# Patient Record
Sex: MALE | Race: WHITE | NOT HISPANIC OR LATINO | URBAN - NONMETROPOLITAN AREA
[De-identification: names, ages, dates, MRNs, and addresses within clinical notes are randomized per-mention and may not be internally consistent; named-entity substitution may affect disease eponyms.]

---

## 2024-03-30 ENCOUNTER — APPOINTMENT (OUTPATIENT)
Dept: CT IMAGING | Facility: HOSPITAL | Age: 39
End: 2024-03-30
Payer: COMMERCIAL

## 2024-03-30 ENCOUNTER — APPOINTMENT (OUTPATIENT)
Dept: ULTRASOUND IMAGING | Facility: HOSPITAL | Age: 39
End: 2024-03-30
Payer: COMMERCIAL

## 2024-03-30 ENCOUNTER — HOSPITAL ENCOUNTER (INPATIENT)
Facility: HOSPITAL | Age: 39
LOS: 4 days | Discharge: HOME | End: 2024-04-03
Attending: STUDENT IN AN ORGANIZED HEALTH CARE EDUCATION/TRAINING PROGRAM | Admitting: FAMILY MEDICINE
Payer: COMMERCIAL

## 2024-03-30 DIAGNOSIS — D72.829 LEUKOCYTOSIS, UNSPECIFIED TYPE: ICD-10-CM

## 2024-03-30 DIAGNOSIS — K80.20 GALLSTONES: ICD-10-CM

## 2024-03-30 DIAGNOSIS — K85.90 ACUTE PANCREATITIS, UNSPECIFIED COMPLICATION STATUS, UNSPECIFIED PANCREATITIS TYPE: Primary | ICD-10-CM

## 2024-03-30 DIAGNOSIS — R52 INTRACTABLE PAIN: ICD-10-CM

## 2024-03-30 LAB
ALBUMIN SERPL-MCNC: 4.6 G/DL (ref 3.5–5.2)
ALBUMIN/GLOB SERPL: 2.7 G/DL
ALP SERPL-CCNC: 63 U/L (ref 39–117)
ALT SERPL W P-5'-P-CCNC: 5 U/L (ref 1–41)
ANION GAP SERPL CALCULATED.3IONS-SCNC: 12.2 MMOL/L (ref 5–15)
AST SERPL-CCNC: 5 U/L (ref 1–40)
BACTERIA UR QL AUTO: NORMAL /HPF
BASOPHILS # BLD AUTO: 0.07 10*3/MM3 (ref 0–0.2)
BASOPHILS NFR BLD AUTO: 0.4 % (ref 0–1.5)
BILIRUB SERPL-MCNC: 0.3 MG/DL (ref 0–1.2)
BILIRUB UR QL STRIP: NEGATIVE
BUN SERPL-MCNC: 18 MG/DL (ref 6–20)
BUN/CREAT SERPL: 14.1 (ref 7–25)
CALCIUM SPEC-SCNC: 9 MG/DL (ref 8.6–10.5)
CHLORIDE SERPL-SCNC: 96 MMOL/L (ref 98–107)
CLARITY UR: CLEAR
CO2 SERPL-SCNC: 24.8 MMOL/L (ref 22–29)
COLOR UR: YELLOW
CREAT SERPL-MCNC: 1.28 MG/DL (ref 0.76–1.27)
D-LACTATE SERPL-SCNC: 1.1 MMOL/L (ref 0.5–2)
DEPRECATED RDW RBC AUTO: 44 FL (ref 37–54)
EGFRCR SERPLBLD CKD-EPI 2021: 72.6 ML/MIN/1.73
EOSINOPHIL # BLD AUTO: 0.08 10*3/MM3 (ref 0–0.4)
EOSINOPHIL NFR BLD AUTO: 0.5 % (ref 0.3–6.2)
ERYTHROCYTE [DISTWIDTH] IN BLOOD BY AUTOMATED COUNT: 13.7 % (ref 12.3–15.4)
GLOBULIN UR ELPH-MCNC: 1.7 GM/DL
GLUCOSE SERPL-MCNC: 117 MG/DL (ref 65–99)
GLUCOSE UR STRIP-MCNC: NEGATIVE MG/DL
HCT VFR BLD AUTO: 40.4 % (ref 37.5–51)
HGB BLD-MCNC: 13.9 G/DL (ref 13–17.7)
HGB UR QL STRIP.AUTO: NEGATIVE
HOLD SPECIMEN: NORMAL
HOLD SPECIMEN: NORMAL
HYALINE CASTS UR QL AUTO: NORMAL /LPF
IMM GRANULOCYTES # BLD AUTO: 0.15 10*3/MM3 (ref 0–0.05)
IMM GRANULOCYTES NFR BLD AUTO: 0.9 % (ref 0–0.5)
KETONES UR QL STRIP: NEGATIVE
LEUKOCYTE ESTERASE UR QL STRIP.AUTO: NEGATIVE
LIPASE SERPL-CCNC: 1510 U/L (ref 13–60)
LYMPHOCYTES # BLD AUTO: 3.09 10*3/MM3 (ref 0.7–3.1)
LYMPHOCYTES NFR BLD AUTO: 18.6 % (ref 19.6–45.3)
MCH RBC QN AUTO: 30.4 PG (ref 26.6–33)
MCHC RBC AUTO-ENTMCNC: 34.4 G/DL (ref 31.5–35.7)
MCV RBC AUTO: 88.4 FL (ref 79–97)
MONOCYTES # BLD AUTO: 0.84 10*3/MM3 (ref 0.1–0.9)
MONOCYTES NFR BLD AUTO: 5.1 % (ref 5–12)
NEUTROPHILS NFR BLD AUTO: 12.36 10*3/MM3 (ref 1.7–7)
NEUTROPHILS NFR BLD AUTO: 74.5 % (ref 42.7–76)
NITRITE UR QL STRIP: NEGATIVE
NRBC BLD AUTO-RTO: 0.2 /100 WBC (ref 0–0.2)
PH UR STRIP.AUTO: 5.5 [PH] (ref 5–8)
PLATELET # BLD AUTO: 230 10*3/MM3 (ref 140–450)
PMV BLD AUTO: 9.5 FL (ref 6–12)
POTASSIUM SERPL-SCNC: 4.2 MMOL/L (ref 3.5–5.2)
PROT SERPL-MCNC: 6.3 G/DL (ref 6–8.5)
PROT UR QL STRIP: ABNORMAL
RBC # BLD AUTO: 4.57 10*6/MM3 (ref 4.14–5.8)
RBC # UR STRIP: NORMAL /HPF
REF LAB TEST METHOD: NORMAL
SODIUM SERPL-SCNC: 133 MMOL/L (ref 136–145)
SP GR UR STRIP: >=1.03 (ref 1–1.03)
SQUAMOUS #/AREA URNS HPF: NORMAL /HPF
TROPONIN T SERPL HS-MCNC: <6 NG/L
UROBILINOGEN UR QL STRIP: ABNORMAL
WBC # UR STRIP: NORMAL /HPF
WBC NRBC COR # BLD AUTO: 16.59 10*3/MM3 (ref 3.4–10.8)
WHOLE BLOOD HOLD COAG: NORMAL
WHOLE BLOOD HOLD SPECIMEN: NORMAL

## 2024-03-30 PROCEDURE — G0378 HOSPITAL OBSERVATION PER HR: HCPCS

## 2024-03-30 PROCEDURE — 25010000002 MORPHINE PER 10 MG: Performed by: STUDENT IN AN ORGANIZED HEALTH CARE EDUCATION/TRAINING PROGRAM

## 2024-03-30 PROCEDURE — 99285 EMERGENCY DEPT VISIT HI MDM: CPT

## 2024-03-30 PROCEDURE — 76705 ECHO EXAM OF ABDOMEN: CPT

## 2024-03-30 PROCEDURE — 83605 ASSAY OF LACTIC ACID: CPT

## 2024-03-30 PROCEDURE — 25510000001 IOPAMIDOL 61 % SOLUTION: Performed by: STUDENT IN AN ORGANIZED HEALTH CARE EDUCATION/TRAINING PROGRAM

## 2024-03-30 PROCEDURE — 99222 1ST HOSP IP/OBS MODERATE 55: CPT | Performed by: FAMILY MEDICINE

## 2024-03-30 PROCEDURE — 80053 COMPREHEN METABOLIC PANEL: CPT | Performed by: STUDENT IN AN ORGANIZED HEALTH CARE EDUCATION/TRAINING PROGRAM

## 2024-03-30 PROCEDURE — 25010000002 ONDANSETRON PER 1 MG

## 2024-03-30 PROCEDURE — 83690 ASSAY OF LIPASE: CPT | Performed by: STUDENT IN AN ORGANIZED HEALTH CARE EDUCATION/TRAINING PROGRAM

## 2024-03-30 PROCEDURE — 85025 COMPLETE CBC W/AUTO DIFF WBC: CPT | Performed by: STUDENT IN AN ORGANIZED HEALTH CARE EDUCATION/TRAINING PROGRAM

## 2024-03-30 PROCEDURE — 93005 ELECTROCARDIOGRAM TRACING: CPT | Performed by: STUDENT IN AN ORGANIZED HEALTH CARE EDUCATION/TRAINING PROGRAM

## 2024-03-30 PROCEDURE — 81001 URINALYSIS AUTO W/SCOPE: CPT | Performed by: STUDENT IN AN ORGANIZED HEALTH CARE EDUCATION/TRAINING PROGRAM

## 2024-03-30 PROCEDURE — 25810000003 SODIUM CHLORIDE 0.9 % SOLUTION

## 2024-03-30 PROCEDURE — 84484 ASSAY OF TROPONIN QUANT: CPT | Performed by: STUDENT IN AN ORGANIZED HEALTH CARE EDUCATION/TRAINING PROGRAM

## 2024-03-30 PROCEDURE — 74177 CT ABD & PELVIS W/CONTRAST: CPT

## 2024-03-30 PROCEDURE — 25010000002 KETOROLAC TROMETHAMINE PER 15 MG

## 2024-03-30 RX ORDER — MORPHINE SULFATE 2 MG/ML
2 INJECTION, SOLUTION INTRAMUSCULAR; INTRAVENOUS
Status: DISCONTINUED | OUTPATIENT
Start: 2024-03-30 | End: 2024-04-01

## 2024-03-30 RX ORDER — MORPHINE SULFATE 2 MG/ML
2 INJECTION, SOLUTION INTRAMUSCULAR; INTRAVENOUS ONCE
Status: COMPLETED | OUTPATIENT
Start: 2024-03-30 | End: 2024-03-30

## 2024-03-30 RX ORDER — AMOXICILLIN 250 MG
2 CAPSULE ORAL 2 TIMES DAILY PRN
Status: DISCONTINUED | OUTPATIENT
Start: 2024-03-30 | End: 2024-04-03 | Stop reason: HOSPADM

## 2024-03-30 RX ORDER — SODIUM CHLORIDE 9 MG/ML
40 INJECTION, SOLUTION INTRAVENOUS AS NEEDED
Status: DISCONTINUED | OUTPATIENT
Start: 2024-03-30 | End: 2024-04-03 | Stop reason: HOSPADM

## 2024-03-30 RX ORDER — ACETAMINOPHEN 650 MG/1
650 SUPPOSITORY RECTAL EVERY 4 HOURS PRN
Status: DISCONTINUED | OUTPATIENT
Start: 2024-03-30 | End: 2024-04-03 | Stop reason: HOSPADM

## 2024-03-30 RX ORDER — ONDANSETRON 2 MG/ML
4 INJECTION INTRAMUSCULAR; INTRAVENOUS EVERY 6 HOURS PRN
Status: DISCONTINUED | OUTPATIENT
Start: 2024-03-30 | End: 2024-04-03 | Stop reason: HOSPADM

## 2024-03-30 RX ORDER — SODIUM CHLORIDE 0.9 % (FLUSH) 0.9 %
10 SYRINGE (ML) INJECTION AS NEEDED
Status: DISCONTINUED | OUTPATIENT
Start: 2024-03-30 | End: 2024-04-03 | Stop reason: HOSPADM

## 2024-03-30 RX ORDER — POLYETHYLENE GLYCOL 3350 17 G/17G
17 POWDER, FOR SOLUTION ORAL DAILY PRN
Status: DISCONTINUED | OUTPATIENT
Start: 2024-03-30 | End: 2024-04-03 | Stop reason: HOSPADM

## 2024-03-30 RX ORDER — ONDANSETRON 2 MG/ML
4 INJECTION INTRAMUSCULAR; INTRAVENOUS ONCE
Status: COMPLETED | OUTPATIENT
Start: 2024-03-30 | End: 2024-03-30

## 2024-03-30 RX ORDER — FAMOTIDINE 10 MG/ML
20 INJECTION, SOLUTION INTRAVENOUS ONCE
Status: COMPLETED | OUTPATIENT
Start: 2024-03-30 | End: 2024-03-30

## 2024-03-30 RX ORDER — ACETAMINOPHEN 325 MG/1
650 TABLET ORAL EVERY 4 HOURS PRN
Status: DISCONTINUED | OUTPATIENT
Start: 2024-03-30 | End: 2024-04-03 | Stop reason: HOSPADM

## 2024-03-30 RX ORDER — ACETAMINOPHEN 160 MG/5ML
650 SOLUTION ORAL EVERY 4 HOURS PRN
Status: DISCONTINUED | OUTPATIENT
Start: 2024-03-30 | End: 2024-04-03 | Stop reason: HOSPADM

## 2024-03-30 RX ORDER — NALOXONE HCL 0.4 MG/ML
0.4 VIAL (ML) INJECTION
Status: DISCONTINUED | OUTPATIENT
Start: 2024-03-30 | End: 2024-04-01

## 2024-03-30 RX ORDER — BISACODYL 5 MG/1
5 TABLET, DELAYED RELEASE ORAL DAILY PRN
Status: DISCONTINUED | OUTPATIENT
Start: 2024-03-30 | End: 2024-04-03 | Stop reason: HOSPADM

## 2024-03-30 RX ORDER — SODIUM CHLORIDE 9 MG/ML
125 INJECTION, SOLUTION INTRAVENOUS CONTINUOUS
Status: DISCONTINUED | OUTPATIENT
Start: 2024-03-31 | End: 2024-03-31

## 2024-03-30 RX ORDER — BISACODYL 10 MG
10 SUPPOSITORY, RECTAL RECTAL DAILY PRN
Status: DISCONTINUED | OUTPATIENT
Start: 2024-03-30 | End: 2024-04-03 | Stop reason: HOSPADM

## 2024-03-30 RX ORDER — ENOXAPARIN SODIUM 100 MG/ML
40 INJECTION SUBCUTANEOUS NIGHTLY
Status: DISCONTINUED | OUTPATIENT
Start: 2024-03-31 | End: 2024-04-03 | Stop reason: HOSPADM

## 2024-03-30 RX ORDER — SODIUM CHLORIDE 0.9 % (FLUSH) 0.9 %
10 SYRINGE (ML) INJECTION EVERY 12 HOURS SCHEDULED
Status: DISCONTINUED | OUTPATIENT
Start: 2024-03-31 | End: 2024-04-03 | Stop reason: HOSPADM

## 2024-03-30 RX ORDER — CHOLECALCIFEROL (VITAMIN D3) 125 MCG
5 CAPSULE ORAL NIGHTLY PRN
Status: DISCONTINUED | OUTPATIENT
Start: 2024-03-30 | End: 2024-04-03 | Stop reason: HOSPADM

## 2024-03-30 RX ORDER — KETOROLAC TROMETHAMINE 30 MG/ML
15 INJECTION, SOLUTION INTRAMUSCULAR; INTRAVENOUS ONCE
Status: COMPLETED | OUTPATIENT
Start: 2024-03-30 | End: 2024-03-30

## 2024-03-30 RX ADMIN — ONDANSETRON 4 MG: 2 INJECTION INTRAMUSCULAR; INTRAVENOUS at 19:06

## 2024-03-30 RX ADMIN — MORPHINE SULFATE 4 MG: 4 INJECTION, SOLUTION INTRAMUSCULAR; INTRAVENOUS at 21:26

## 2024-03-30 RX ADMIN — FAMOTIDINE 20 MG: 10 INJECTION INTRAVENOUS at 19:07

## 2024-03-30 RX ADMIN — SODIUM CHLORIDE 1000 ML: 9 INJECTION, SOLUTION INTRAVENOUS at 19:11

## 2024-03-30 RX ADMIN — MORPHINE SULFATE 2 MG: 2 INJECTION, SOLUTION INTRAMUSCULAR; INTRAVENOUS at 22:29

## 2024-03-30 RX ADMIN — KETOROLAC TROMETHAMINE 15 MG: 30 INJECTION, SOLUTION INTRAMUSCULAR; INTRAVENOUS at 19:04

## 2024-03-30 RX ADMIN — IOPAMIDOL 100 ML: 612 INJECTION, SOLUTION INTRAVENOUS at 19:37

## 2024-03-30 NOTE — ED PROVIDER NOTES
"Subjective  History of Present Illness:    This is a 39-year-old male present emergency room today for evaluation of abdominal pain.  Reports left lower and left upper quadrant in nature.  No history of pancreatitis.  He denies sniffing and alcohol use.  No fevers.  Went to urgent care earlier had a KUB performed and they told him that he was likely constipated, he was given a GI cocktail and MiraLAX and had a bowel movement at the urgent care that was normal per him.  No hematochezia no melena.  Some nausea, no vomiting.  No dysuria hematuria urgency or frequency.  No chest pain no shortness of air.  Has a history of reflux/heartburn but reports this feels different.      Nurses Notes reviewed and agree, including vitals, allergies, social history and prior medical history.     REVIEW OF SYSTEMS: All systems reviewed and not pertinent unless noted.  Review of Systems   Constitutional:  Negative for fever.   Respiratory:  Negative for shortness of breath.    Cardiovascular:  Negative for chest pain.   Gastrointestinal:  Positive for abdominal pain and nausea. Negative for diarrhea and vomiting.   Genitourinary:  Negative for dysuria, flank pain, frequency, hematuria, testicular pain and urgency.   All other systems reviewed and are negative.      Past Medical History:   Diagnosis Date    Psoriatic arthritis        Allergies:    Patient has no known allergies.      History reviewed. No pertinent surgical history.      Social History     Socioeconomic History    Marital status:    Tobacco Use    Smoking status: Never    Smokeless tobacco: Never   Vaping Use    Vaping status: Never Used   Substance and Sexual Activity    Alcohol use: Yes    Drug use: Never    Sexual activity: Defer         History reviewed. No pertinent family history.    Objective  Physical Exam:  /85   Pulse 76   Temp 98 °F (36.7 °C)   Resp 16   Ht 175.3 cm (69\")   Wt 118 kg (260 lb)   SpO2 93%   BMI 38.40 kg/m²      Physical " Exam  Vitals and nursing note reviewed.   Constitutional:       General: He is not in acute distress.     Appearance: He is obese. He is not ill-appearing, toxic-appearing or diaphoretic.   HENT:      Head: Normocephalic and atraumatic.      Mouth/Throat:      Mouth: Mucous membranes are moist.      Pharynx: Oropharynx is clear.   Eyes:      Extraocular Movements: Extraocular movements intact.   Cardiovascular:      Rate and Rhythm: Normal rate and regular rhythm.      Heart sounds: Normal heart sounds.   Pulmonary:      Effort: Pulmonary effort is normal.      Breath sounds: Normal breath sounds.   Abdominal:      General: Abdomen is protuberant. Bowel sounds are normal.      Palpations: Abdomen is soft.      Tenderness: There is generalized abdominal tenderness and tenderness in the epigastric area and left upper quadrant. There is no guarding or rebound.      Hernia: No hernia is present.   Skin:     General: Skin is warm and dry.      Capillary Refill: Capillary refill takes less than 2 seconds.   Neurological:      General: No focal deficit present.      Mental Status: He is alert and oriented to person, place, and time.   Psychiatric:         Mood and Affect: Mood normal. Mood is not anxious.         Behavior: Behavior normal.           Procedures    ED Course:    ED Course as of 03/30/24 2218   Sat Mar 30, 2024   1955 ATTENDING ATTESTATION  HPI: 39-year-old male presents with left-sided abdominal pain for the past several days.    MDM: ED workup reviewed.  CBC shows leukocytosis.  Lipase 1500.  CMP clinically unremarkable.  CT abdomen pelvis/ultrasound gallbladder pending.     [JS]   2217 Lipase(!): 1,510 [JR]   2217 CT Abdomen Pelvis With Contrast [JR]   2217 US Gallbladder [JR]      ED Course User Index  [JR] Dwain Vizcaino PA-C  [JS] Ollie Abarca DO       Lab Results (last 24 hours)       Procedure Component Value Units Date/Time    CBC & Differential [[id number]]  (Abnormal) Collected: 03/30/24 1842     Specimen: Blood Updated: 03/30/24 1919    Narrative:      The following orders were created for panel order CBC & Differential.  Procedure                               Abnormality         Status                     ---------                               -----------         ------                     CBC Auto Differential[[id number]]        Abnormal            Final result                 Please view results for these tests on the individual orders.    Comprehensive Metabolic Panel [[id number]]  (Abnormal) Collected: 03/30/24 1842    Specimen: Blood Updated: 03/30/24 1927     Glucose 117 mg/dL      BUN 18 mg/dL      Creatinine 1.28 mg/dL      Sodium 133 mmol/L      Potassium 4.2 mmol/L      Comment: Slight hemolysis detected by analyzer. Result may be falsely elevated.        Chloride 96 mmol/L      CO2 24.8 mmol/L      Calcium 9.0 mg/dL      Total Protein 6.3 g/dL      Albumin 4.6 g/dL      ALT (SGPT) 5 U/L      AST (SGOT) 5 U/L      Comment: Slight hemolysis detected by analyzer. Result may be falsely elevated.        Alkaline Phosphatase 63 U/L      Total Bilirubin 0.3 mg/dL      Globulin 1.7 gm/dL      A/G Ratio 2.7 g/dL      BUN/Creatinine Ratio 14.1     Anion Gap 12.2 mmol/L      eGFR 72.6 mL/min/1.73     Narrative:      GFR Normal >60  Chronic Kidney Disease <60  Kidney Failure <15    Specimen extremely lipemic, manual entry    Lipase [[id number]]  (Abnormal) Collected: 03/30/24 1842    Specimen: Blood Updated: 03/30/24 1923     Lipase 1,510 U/L     Single High Sensitivity Troponin T [[id number]]  (Normal) Collected: 03/30/24 1842    Specimen: Blood Updated: 03/30/24 1916     HS Troponin T <6 ng/L      Comment: Specimen hemolyzed.  Results may be falsely decreased.       Narrative:      Specimen extremely lipemic  High Sensitive Troponin T Reference Range:  <14.0 ng/L- Negative Female for AMI  <22.0 ng/L- Negative Male for AMI  >=14 - Abnormal Female indicating possible myocardial injury.  >=22 - Abnormal  Male indicating possible myocardial injury.   Clinicians would have to utilize clinical acumen, EKG, Troponin, and serial changes to determine if it is an Acute Myocardial Infarction or myocardial injury due to an underlying chronic condition.         CBC Auto Differential [[id number]]  (Abnormal) Collected: 03/30/24 1842    Specimen: Blood Updated: 03/30/24 1919     WBC 16.59 10*3/mm3      RBC 4.57 10*6/mm3      Hemoglobin 13.9 g/dL      Hematocrit 40.4 %      MCV 88.4 fL      MCH 30.4 pg      MCHC 34.4 g/dL      RDW 13.7 %      RDW-SD 44.0 fl      MPV 9.5 fL      Platelets 230 10*3/mm3      Neutrophil % 74.5 %      Lymphocyte % 18.6 %      Monocyte % 5.1 %      Eosinophil % 0.5 %      Basophil % 0.4 %      Immature Grans % 0.9 %      Neutrophils, Absolute 12.36 10*3/mm3      Lymphocytes, Absolute 3.09 10*3/mm3      Monocytes, Absolute 0.84 10*3/mm3      Eosinophils, Absolute 0.08 10*3/mm3      Basophils, Absolute 0.07 10*3/mm3      Immature Grans, Absolute 0.15 10*3/mm3      nRBC 0.2 /100 WBC     Lactic Acid, Plasma [[id number]]  (Normal) Collected: 03/30/24 1900    Specimen: Blood Updated: 03/30/24 1919     Lactate 1.1 mmol/L     Urinalysis With Microscopic If Indicated (No Culture) - Urine, Clean Catch [[id number]]  (Abnormal) Collected: 03/30/24 1948    Specimen: Urine, Clean Catch Updated: 03/30/24 1956     Color, UA Yellow     Appearance, UA Clear     pH, UA 5.5     Specific Gravity, UA >=1.030     Glucose, UA Negative     Ketones, UA Negative     Bilirubin, UA Negative     Blood, UA Negative     Protein, UA 30 mg/dL (1+)     Leuk Esterase, UA Negative     Nitrite, UA Negative     Urobilinogen, UA 0.2 E.U./dL    Urinalysis, Microscopic Only - Urine, Clean Catch [[id number]] Collected: 03/30/24 1948    Specimen: Urine, Clean Catch Updated: 03/30/24 2000     RBC, UA 0-2 /HPF      WBC, UA None Seen /HPF      Bacteria, UA None Seen /HPF      Squamous Epithelial Cells, UA None Seen /HPF      Hyaline Casts, UA None  Seen /LPF      Methodology Manual Light Microscopy             CT Abdomen Pelvis With Contrast    Result Date: 3/30/2024  FINAL REPORT TECHNIQUE: Axial CT images were performed from the lung bases through the symphysis pubis after the administration of intravenous contrast.  This study was performed with techniques to keep radiation doses as low as reasonably achievable (ALARA). Individualized dose reduction techniques using automated exposure control or adjustment of mA and/or kV according to the patient's size were employed. CLINICAL HISTORY: epigasttric, LUQ LLQ abdominal pain nausea rule out pancreatitis FINDINGS: LOWER CHEST: The heart is normal size.  The lung bases are clear.  ABDOMEN/PELVIS:  Liver, gallbladder and bile ducts: The liver enhances homogeneously without suspicious focal hepatic lesion.  There are gallstones without cholecystitis.  There is no definite biliary duct dilatation.  Adrenal glands: The adrenal glands are morphologically unremarkable without suspicious lesion.  Kidneys, ureter and urinary bladder: No suspicious renal lesion.  No hydronephrosis.  Urinary bladder is unremarkable.  Spleen: The spleen is normal size.  Pancreas: There is diffuse peripancreatic inflammation.  GI systems and mesentery: No evidence of bowel obstruction.  The appendix is visualized and unremarkable in appearance.  No significant mesenteric inflammation.  Lymph nodes: No definite pathologically enlarged abdominal or pelvic lymph nodes present. Vessels: The aorta and abdominal arteries are grossly patent. The IVC and portal vein are patent and grossly unremarkable. Peritoneum: No free intraperitoneal fluid or pneumoperitoneum. Pelvic viscera: No acute findings.  Body wall: No body wall contusion. No significant body wall hernias.  Bones: No acute fracture.     Impression: Acute pancreatitis. Authenticated and Electronically Signed by Hardik Canela MD on 03/30/2024 09:26:16 PM    US Gallbladder    Result Date:  3/30/2024  FINAL REPORT TECHNIQUE: sonographic images of the right upper quadrant were obtained. CLINICAL HISTORY: gallstones on ct imaging, pancreatitis, eval for bile duct obstruction or dila FINDINGS: There is a diffusely echogenic liver.  There is no visualized suspicious hepatic lesion.  There is no significant biliary ductal dilatation.  There are gallstones without cholecystitis. The gallbladder is normal.  There is an unremarkable right kidney.  The pancreas is obscured by bowel gas.     Impression: Hepatic steatosis.  Cholelithiasis without cholecystitis. Authenticated and Electronically Signed by Hardik Canela MD on 03/30/2024 09:26:15 PM    XR Abdomen Flat & Upright    Result Date: 3/30/2024  FINAL REPORT CLINICAL HISTORY: gas and abdomen distension COMPARISON: None FINDINGS: SINGLE VIEW ABDOMEN  A single view of the abdomen was obtained. There is a nonspecific bowel gas pattern.  There are probable phleboliths in the pelvis.  There is transitional anatomy at the lumbosacral junction.     Impression: Nonspecific bowel gas pattern.  Consider CT if symptoms persist. Authenticated and Electronically Signed by Tiago Reich DO on 03/30/2024 04:54:23 PM        MDM     Amount and/or Complexity of Data Reviewed  Clinical lab tests: reviewed  Tests in the radiology section of CPT®: reviewed  Tests in the medicine section of CPT®: reviewed        Initial impression of presenting illness: This is a 39-year-old male present emergency room today for evaluation of abdominal pain x 1 day.  Seen at the urgent care earlier.  KUB revealed nonspecific bowel gas pattern    DDX: includes but is not limited to: Enteritis, gastroenteritis, colitis, pancreatitis, gallbladder pathology, diverticulitis, constipation, bowel obstruction, others    Patient arrives hemodynamically stable afebrile nontachycardic nonhypoxic nontoxic-appearing with vitals interpreted by myself.     Pertinent features from physical exam: Abdomen is soft  but he does have tenderness generalized, left upper or left lower quadrant.  Additionally has some tenderness palpation epigastric region.  Cardiac auscultation regular and rhythm lungs clear.  Oropharynx clear..  No obvious incarcerated hernia to the abdomen    Initial diagnostic plan: CBC CMP lipase troponin lactic acid urinalysis CT abdomen pelvis with contrast    Results from initial plan were reviewed and interpreted by me revealing CBC reveals leukocytosis 16.59.  Creatinine 1.28, sodium of 133, glucose of 117.  Lipase is elevated at 1510, lactic is normal.  Based on his laboratory study prior to CTM suspicious for pancreatitis given his physical exam and commendation with the patient's lipase.  CT scan confirms diagnosis of acute pancreatitis with Benja pancreatic inflammation, as well as gallstones.  Right upper quadrant ultrasound does not show any common bile duct dilatation but does show gallstones.  No cholecystitis identified on CT scan or ultrasound per radiology.  EKG sinus rhythm rate of 71.  There is no obstructive pattern on CMP between his transaminases and bilirubin to suggest obstructive pattern.  Urinalysis negative for infection.  Diagnostic information from other sources: Record reviewed including KUB from urgent care visit earlier this date    Interventions / Re-evaluation: Pepcid Zofran Toradol 1 L fluids, patient received 6 of IV morphine, having continued intractable pain.    Results/clinical rationale were discussed with him at bedside, discussed likely gallstone induced pancreatitis.  He is agreeable to admission given intractable pain.    Consultations/Discussion of results with other physicians: Discussed with hospitalist for admission, dr. Thurston, [facility]    Disposition plan: Admit for intractable pain, acute pancreatitis, obs [facility] level of care.  -----    Final diagnoses:   Acute pancreatitis, unspecified complication status, unspecified  pancreatitis type   Leukocytosis, unspecified type   Gallstones   Intractable pain          Dwain Vizcaino PA-C  03/30/24 1173       Dwain Vizcaino PA-C  03/30/24 2899

## 2024-03-31 PROBLEM — K85.90 PANCREATITIS: Status: ACTIVE | Noted: 2024-03-31

## 2024-03-31 LAB
ALBUMIN SERPL-MCNC: 3.8 G/DL (ref 3.5–5.2)
ALBUMIN/GLOB SERPL: 1.1 G/DL
ALP SERPL-CCNC: 60 U/L (ref 39–117)
ALT SERPL W P-5'-P-CCNC: <5 U/L (ref 1–41)
ANION GAP SERPL CALCULATED.3IONS-SCNC: 11.2 MMOL/L (ref 5–15)
ANION GAP SERPL CALCULATED.3IONS-SCNC: 13.2 MMOL/L (ref 5–15)
ARTICHOKE IGE QN: 23 MG/DL (ref 0–100)
AST SERPL-CCNC: <5 U/L (ref 1–40)
BILIRUB SERPL-MCNC: 0.4 MG/DL (ref 0–1.2)
BUN SERPL-MCNC: 13 MG/DL (ref 6–20)
BUN SERPL-MCNC: 9 MG/DL (ref 6–20)
BUN/CREAT SERPL: 13.7 (ref 7–25)
BUN/CREAT SERPL: 9.9 (ref 7–25)
CALCIUM SPEC-SCNC: 7.5 MG/DL (ref 8.6–10.5)
CALCIUM SPEC-SCNC: 7.6 MG/DL (ref 8.6–10.5)
CHLORIDE SERPL-SCNC: 93 MMOL/L (ref 98–107)
CHLORIDE SERPL-SCNC: 97 MMOL/L (ref 98–107)
CHOLEST SERPL-MCNC: 582 MG/DL (ref 0–200)
CO2 SERPL-SCNC: 23.8 MMOL/L (ref 22–29)
CO2 SERPL-SCNC: 23.8 MMOL/L (ref 22–29)
CREAT SERPL-MCNC: 0.91 MG/DL (ref 0.76–1.27)
CREAT SERPL-MCNC: 0.95 MG/DL (ref 0.76–1.27)
DEPRECATED RDW RBC AUTO: 45.6 FL (ref 37–54)
EGFRCR SERPLBLD CKD-EPI 2021: 103.8 ML/MIN/1.73
EGFRCR SERPLBLD CKD-EPI 2021: 109.3 ML/MIN/1.73
ERYTHROCYTE [DISTWIDTH] IN BLOOD BY AUTOMATED COUNT: 14 % (ref 12.3–15.4)
GLOBULIN UR ELPH-MCNC: 3.4 GM/DL
GLUCOSE BLDC GLUCOMTR-MCNC: 102 MG/DL (ref 70–130)
GLUCOSE BLDC GLUCOMTR-MCNC: 106 MG/DL (ref 70–130)
GLUCOSE BLDC GLUCOMTR-MCNC: 108 MG/DL (ref 70–130)
GLUCOSE BLDC GLUCOMTR-MCNC: 108 MG/DL (ref 70–130)
GLUCOSE BLDC GLUCOMTR-MCNC: 110 MG/DL (ref 70–130)
GLUCOSE BLDC GLUCOMTR-MCNC: 111 MG/DL (ref 70–130)
GLUCOSE BLDC GLUCOMTR-MCNC: 117 MG/DL (ref 70–130)
GLUCOSE BLDC GLUCOMTR-MCNC: 118 MG/DL (ref 70–130)
GLUCOSE BLDC GLUCOMTR-MCNC: 118 MG/DL (ref 70–130)
GLUCOSE BLDC GLUCOMTR-MCNC: 119 MG/DL (ref 70–130)
GLUCOSE SERPL-MCNC: 108 MG/DL (ref 65–99)
GLUCOSE SERPL-MCNC: 109 MG/DL (ref 65–99)
HCT VFR BLD AUTO: 36.8 % (ref 37.5–51)
HDLC SERPL-MCNC: 15 MG/DL (ref 40–60)
HGB BLD-MCNC: 13.4 G/DL (ref 13–17.7)
LDLC SERPL CALC-MCNC: ABNORMAL MG/DL
LDLC/HDLC SERPL: ABNORMAL {RATIO}
LIPASE SERPL-CCNC: 821 U/L (ref 13–60)
MCH RBC QN AUTO: 32.8 PG (ref 26.6–33)
MCHC RBC AUTO-ENTMCNC: 36.4 G/DL (ref 31.5–35.7)
MCV RBC AUTO: 90 FL (ref 79–97)
PLATELET # BLD AUTO: 247 10*3/MM3 (ref 140–450)
PMV BLD AUTO: 9.6 FL (ref 6–12)
POTASSIUM SERPL-SCNC: 3.5 MMOL/L (ref 3.5–5.2)
POTASSIUM SERPL-SCNC: 3.9 MMOL/L (ref 3.5–5.2)
PROT SERPL-MCNC: 7.2 G/DL (ref 6–8.5)
RBC # BLD AUTO: 4.09 10*6/MM3 (ref 4.14–5.8)
SODIUM SERPL-SCNC: 130 MMOL/L (ref 136–145)
SODIUM SERPL-SCNC: 132 MMOL/L (ref 136–145)
TRIGL SERPL-MCNC: 1895 MG/DL (ref 0–150)
TRIGL SERPL-MCNC: 2933 MG/DL (ref 0–150)
VLDLC SERPL-MCNC: ABNORMAL MG/DL
WBC NRBC COR # BLD AUTO: 12.13 10*3/MM3 (ref 3.4–10.8)

## 2024-03-31 PROCEDURE — 80053 COMPREHEN METABOLIC PANEL: CPT | Performed by: FAMILY MEDICINE

## 2024-03-31 PROCEDURE — 83690 ASSAY OF LIPASE: CPT | Performed by: FAMILY MEDICINE

## 2024-03-31 PROCEDURE — 82948 REAGENT STRIP/BLOOD GLUCOSE: CPT

## 2024-03-31 PROCEDURE — 25010000002 MORPHINE PER 10 MG: Performed by: FAMILY MEDICINE

## 2024-03-31 PROCEDURE — 82948 REAGENT STRIP/BLOOD GLUCOSE: CPT | Performed by: INTERNAL MEDICINE

## 2024-03-31 PROCEDURE — 25010000002 ENOXAPARIN PER 10 MG: Performed by: FAMILY MEDICINE

## 2024-03-31 PROCEDURE — 25810000003 SODIUM CHLORIDE 0.9 % SOLUTION: Performed by: FAMILY MEDICINE

## 2024-03-31 PROCEDURE — 83721 ASSAY OF BLOOD LIPOPROTEIN: CPT | Performed by: FAMILY MEDICINE

## 2024-03-31 PROCEDURE — 99233 SBSQ HOSP IP/OBS HIGH 50: CPT | Performed by: INTERNAL MEDICINE

## 2024-03-31 PROCEDURE — 84478 ASSAY OF TRIGLYCERIDES: CPT | Performed by: INTERNAL MEDICINE

## 2024-03-31 PROCEDURE — 85027 COMPLETE CBC AUTOMATED: CPT | Performed by: FAMILY MEDICINE

## 2024-03-31 PROCEDURE — 80061 LIPID PANEL: CPT | Performed by: FAMILY MEDICINE

## 2024-03-31 RX ORDER — NICOTINE POLACRILEX 4 MG
15 LOZENGE BUCCAL
Status: DISCONTINUED | OUTPATIENT
Start: 2024-03-31 | End: 2024-04-02

## 2024-03-31 RX ORDER — DEXTROSE MONOHYDRATE 25 G/50ML
25 INJECTION, SOLUTION INTRAVENOUS
Status: DISCONTINUED | OUTPATIENT
Start: 2024-03-31 | End: 2024-04-02

## 2024-03-31 RX ORDER — DEXTROSE MONOHYDRATE 100 MG/ML
1-2 INJECTION, SOLUTION INTRAVENOUS
Status: DISCONTINUED | OUTPATIENT
Start: 2024-03-31 | End: 2024-04-02

## 2024-03-31 RX ORDER — POTASSIUM CHLORIDE 20 MEQ/1
40 TABLET, EXTENDED RELEASE ORAL EVERY 4 HOURS
Status: COMPLETED | OUTPATIENT
Start: 2024-03-31 | End: 2024-04-01

## 2024-03-31 RX ADMIN — Medication 10 ML: at 08:40

## 2024-03-31 RX ADMIN — ENOXAPARIN SODIUM 40 MG: 40 INJECTION SUBCUTANEOUS at 00:07

## 2024-03-31 RX ADMIN — DEXTROSE MONOHYDRATE 2 ML/KG/HR: 100 INJECTION, SOLUTION INTRAVENOUS at 22:00

## 2024-03-31 RX ADMIN — Medication 10 ML: at 21:12

## 2024-03-31 RX ADMIN — SODIUM CHLORIDE 125 ML/HR: 9 INJECTION, SOLUTION INTRAVENOUS at 00:07

## 2024-03-31 RX ADMIN — MORPHINE SULFATE 2 MG: 2 INJECTION, SOLUTION INTRAMUSCULAR; INTRAVENOUS at 16:45

## 2024-03-31 RX ADMIN — MORPHINE SULFATE 2 MG: 2 INJECTION, SOLUTION INTRAMUSCULAR; INTRAVENOUS at 11:06

## 2024-03-31 RX ADMIN — SODIUM CHLORIDE 125 ML/HR: 9 INJECTION, SOLUTION INTRAVENOUS at 09:09

## 2024-03-31 RX ADMIN — ENOXAPARIN SODIUM 40 MG: 40 INJECTION SUBCUTANEOUS at 21:10

## 2024-03-31 RX ADMIN — MORPHINE SULFATE 2 MG: 2 INJECTION, SOLUTION INTRAMUSCULAR; INTRAVENOUS at 05:09

## 2024-03-31 RX ADMIN — MORPHINE SULFATE 2 MG: 2 INJECTION, SOLUTION INTRAMUSCULAR; INTRAVENOUS at 21:11

## 2024-03-31 RX ADMIN — Medication 10 ML: at 00:09

## 2024-03-31 RX ADMIN — MORPHINE SULFATE 2 MG: 2 INJECTION, SOLUTION INTRAMUSCULAR; INTRAVENOUS at 18:46

## 2024-03-31 RX ADMIN — MORPHINE SULFATE 2 MG: 2 INJECTION, SOLUTION INTRAMUSCULAR; INTRAVENOUS at 23:36

## 2024-03-31 RX ADMIN — MORPHINE SULFATE 2 MG: 2 INJECTION, SOLUTION INTRAMUSCULAR; INTRAVENOUS at 00:09

## 2024-03-31 RX ADMIN — DEXTROSE MONOHYDRATE 113 ML/HR: 100 INJECTION, SOLUTION INTRAVENOUS at 11:30

## 2024-03-31 RX ADMIN — MORPHINE SULFATE 2 MG: 2 INJECTION, SOLUTION INTRAMUSCULAR; INTRAVENOUS at 14:43

## 2024-03-31 RX ADMIN — INSULIN HUMAN 0.05 UNITS/KG/HR: 1 INJECTION, SOLUTION INTRAVENOUS at 11:31

## 2024-03-31 RX ADMIN — POTASSIUM CHLORIDE 40 MEQ: 1500 TABLET, EXTENDED RELEASE ORAL at 22:31

## 2024-03-31 RX ADMIN — MORPHINE SULFATE 2 MG: 2 INJECTION, SOLUTION INTRAMUSCULAR; INTRAVENOUS at 09:09

## 2024-03-31 NOTE — PROGRESS NOTES
"Pharmacy Consult - Enoxaparin Dosing    Ciera Gibson is a 39 y.o. male who has been consulted to dose enoxaparin for VTE prophylaxis.     Allergies    Patient has no known allergies.    Relevant clinical data and objective history reviewed:     [Ht: 175.3 cm (69\"); Wt: 113 kg (249 lb 1.9 oz)]  Body mass index is 36.79 kg/m².    Estimated Creatinine Clearance: 95.1 mL/min (A) (by C-G formula based on SCr of 1.28 mg/dL (H)).    Results from last 7 days   Lab Units 03/30/24  1842   HEMOGLOBIN g/dL 13.9   HEMATOCRIT % 40.4   PLATELETS 10*3/mm3 230   CREATININE mg/dL 1.28*       Asessment/Plan    Initiate Enoxaparin 40 mg  SQ every 24 hours  Pharmacy will monitor Mr. Gibson's renal function and clinical status and adjust the enoxaparin dose and/or frequency as needed.    Thank you,  Kristine Meza RPH,PharmD  3/31/2024  00:42 EDT      "

## 2024-03-31 NOTE — H&P
[facility]      Name:  Ciera Gibson   Age:  39 y.o.  Sex:  male  :  1985  MRN:  [id number]   Visit Number:  [id number]  Admission Date:  3/30/2024  Date Of Service:  24  Primary Care Physician:  Provider, No Known    Chief Complaint:     abdominal pain     History Of Presenting Illness:      Patient is a 39 years old male with a past medical history of psoriasis/psoriatic arthritis who presented to the ER with a chief complaint of abdominal pain.  Patient reports that his pain started suddenly in the morning after he had coffee.  Pain was moderate to the left side of his abdomen then became more prominent in the epigastric area.  He reported some nausea but no vomiting.  No fever, chills or changes in bowel movements.  No urinary symptoms.  Patient went to the urgent care and had a KUB and was told that he is likely constipated, he was given GI cocktail and MiraLAX and was instructed to go to the ER if not better or worse.  Patient went home, had 2 bowel movements but his pain started back again so he came into the ER.  Patient never had similar symptoms previously.  Drinks alcohol randomly and former smoker.    On ER evaluation, his vitals were stable and was afebrile.  His labs were significant for creatinine 1.28, lipase 1510, WBC 16.5.  CT abdomen pelvis showed acute pancreatitis.  US gallbladder showed hepatic steatosis and cholelithiasis without cholecystitis. There is no significant biliary ductal dilatation.  Patient received famotidine, Toradol, morphine, Zofran and 1 L bolus of normal saline while in the ER.  Hospitalist consulted for admission for pain control.    Review Of Systems:    All systems were reviewed and negative except as mentioned in history of presenting illness, assessment and plan.    Past Medical History: Patient  has a past medical history of Psoriatic arthritis.    Past Surgical History: Patient  has no past surgical history  "on file.    Social History: Patient  reports that he has never smoked. He has never used smokeless tobacco. He reports current alcohol use. He reports that he does not use drugs.    Family History:  Patient's family history has been reviewed and found to be noncontributory.     Allergies:      Patient has no known allergies.    Home Medications:    Prior to Admission Medications       Prescriptions Last Dose Informant Patient Reported? Taking?    bisacodyl 5 MG EC tablet   No No    Take 1 tablet by mouth Daily As Needed for Constipation.    Enbrel SureClick 50 MG/ML solution auto-injector   Yes No    polyethylene glycol (MIRALAX) 17 GM/SCOOP powder   No No    17 g po daily prn constipaton          ED Medications:    Medications   sodium chloride 0.9 % flush 10 mL (has no administration in time range)   Morphine sulfate (PF) injection 2 mg (has no administration in time range)   sodium chloride 0.9 % bolus 1,000 mL (0 mL Intravenous Stopped 3/30/24 2011)   ketorolac (TORADOL) injection 15 mg (15 mg Intravenous Given 3/30/24 1904)   famotidine (PEPCID) injection 20 mg (20 mg Intravenous Given 3/30/24 1907)   ondansetron (ZOFRAN) injection 4 mg (4 mg Intravenous Given 3/30/24 1906)   iopamidol (ISOVUE-300) 61 % injection 100 mL (100 mL Intravenous Given 3/30/24 1937)   morphine injection 4 mg (4 mg Intravenous Given 3/30/24 2126)     Vital Signs:  Temp:  [98 °F (36.7 °C)-98.3 °F (36.8 °C)] 98 °F (36.7 °C)  Heart Rate:  [71-84] 71  Resp:  [16-18] 16  BP: (128-142)/(82-98) 136/85        03/30/24  1835   Weight: 118 kg (260 lb)     Body mass index is 38.4 kg/m².    Physical Exam:     Most recent vital Signs: /85   Pulse 71   Temp 98 °F (36.7 °C)   Resp 16   Ht 175.3 cm (69\")   Wt 118 kg (260 lb)   SpO2 94%   BMI 38.40 kg/m²     Physical Exam  Vitals and nursing note reviewed.   Constitutional:       General: He is not in acute distress.     Appearance: He is ill-appearing.   HENT:      Head: Normocephalic and " atraumatic.      Right Ear: External ear normal.      Left Ear: External ear normal.      Nose: Nose normal.      Mouth/Throat:      Mouth: Mucous membranes are moist.   Eyes:      Extraocular Movements: Extraocular movements intact.      Conjunctiva/sclera: Conjunctivae normal.      Pupils: Pupils are equal, round, and reactive to light.   Cardiovascular:      Rate and Rhythm: Normal rate and regular rhythm.      Pulses: Normal pulses.      Heart sounds: Normal heart sounds.   Pulmonary:      Effort: Pulmonary effort is normal.      Breath sounds: Normal breath sounds. No wheezing or rhonchi.   Abdominal:      General: Bowel sounds are normal. There is no distension.      Palpations: Abdomen is soft.      Tenderness: There is abdominal tenderness in the epigastric area. There is no guarding or rebound.   Musculoskeletal:         General: Normal range of motion.      Cervical back: Normal range of motion and neck supple.      Right lower leg: No edema.      Left lower leg: No edema.   Skin:     General: Skin is warm and dry.      Findings: Rash present.      Comments: Psoriasis patches over bilateral elbows   Neurological:      General: No focal deficit present.      Mental Status: He is alert and oriented to person, place, and time. Mental status is at baseline.      Motor: No weakness.   Psychiatric:         Mood and Affect: Mood normal.         Behavior: Behavior normal.         Laboratory data:    I have reviewed the labs done in the emergency room.    Results from last 7 days   Lab Units 03/30/24  1842   SODIUM mmol/L 133*   POTASSIUM mmol/L 4.2   CHLORIDE mmol/L 96*   CO2 mmol/L 24.8   BUN mg/dL 18   CREATININE mg/dL 1.28*   CALCIUM mg/dL 9.0   BILIRUBIN mg/dL 0.3   ALK PHOS U/L 63   ALT (SGPT) U/L 5   AST (SGOT) U/L 5   GLUCOSE mg/dL 117*     Results from last 7 days   Lab Units 03/30/24  1842   WBC 10*3/mm3 16.59*   HEMOGLOBIN g/dL 13.9   HEMATOCRIT % 40.4   PLATELETS 10*3/mm3 230         Results from last 7  days   Lab Units 03/30/24  1842   HSTROP T ng/L <6             Results from last 7 days   Lab Units 03/30/24  1842   LIPASE U/L 1,510*         Results from last 7 days   Lab Units 03/30/24  1948   COLOR UA  Yellow   GLUCOSE UA  Negative   KETONES UA  Negative   BLOOD UA  Negative   LEUKOCYTES UA  Negative   PH, URINE  5.5   BILIRUBIN UA  Negative   UROBILINOGEN UA  0.2 E.U./dL   RBC UA /HPF 0-2   WBC UA /HPF None Seen       Pain Management Panel           No data to display                EKG:      Sinus rhythm, heart rate 71, nonspecific ST/wave changes.    Radiology:    CT Abdomen Pelvis With Contrast    Result Date: 3/30/2024  FINAL REPORT TECHNIQUE: Axial CT images were performed from the lung bases through the symphysis pubis after the administration of intravenous contrast.  This study was performed with techniques to keep radiation doses as low as reasonably achievable (ALARA). Individualized dose reduction techniques using automated exposure control or adjustment of mA and/or kV according to the patient's size were employed. CLINICAL HISTORY: epigasttric, LUQ LLQ abdominal pain nausea rule out pancreatitis FINDINGS: LOWER CHEST: The heart is normal size.  The lung bases are clear.  ABDOMEN/PELVIS:  Liver, gallbladder and bile ducts: The liver enhances homogeneously without suspicious focal hepatic lesion.  There are gallstones without cholecystitis.  There is no definite biliary duct dilatation.  Adrenal glands: The adrenal glands are morphologically unremarkable without suspicious lesion.  Kidneys, ureter and urinary bladder: No suspicious renal lesion.  No hydronephrosis.  Urinary bladder is unremarkable.  Spleen: The spleen is normal size.  Pancreas: There is diffuse peripancreatic inflammation.  GI systems and mesentery: No evidence of bowel obstruction.  The appendix is visualized and unremarkable in appearance.  No significant mesenteric inflammation.  Lymph nodes: No definite pathologically enlarged  abdominal or pelvic lymph nodes present. Vessels: The aorta and abdominal arteries are grossly patent. The IVC and portal vein are patent and grossly unremarkable. Peritoneum: No free intraperitoneal fluid or pneumoperitoneum. Pelvic viscera: No acute findings.  Body wall: No body wall contusion. No significant body wall hernias.  Bones: No acute fracture.     Acute pancreatitis. Authenticated and Electronically Signed by Hardik Canela MD on 03/30/2024 09:26:16 PM    US Gallbladder    Result Date: 3/30/2024  FINAL REPORT TECHNIQUE: sonographic images of the right upper quadrant were obtained. CLINICAL HISTORY: gallstones on ct imaging, pancreatitis, eval for bile duct obstruction or dila FINDINGS: There is a diffusely echogenic liver.  There is no visualized suspicious hepatic lesion.  There is no significant biliary ductal dilatation.  There are gallstones without cholecystitis. The gallbladder is normal.  There is an unremarkable right kidney.  The pancreas is obscured by bowel gas.     Hepatic steatosis.  Cholelithiasis without cholecystitis. Authenticated and Electronically Signed by Hardik Canela MD on 03/30/2024 09:26:15 PM    XR Abdomen Flat & Upright    Result Date: 3/30/2024  FINAL REPORT CLINICAL HISTORY: gas and abdomen distension COMPARISON: None FINDINGS: SINGLE VIEW ABDOMEN  A single view of the abdomen was obtained. There is a nonspecific bowel gas pattern.  There are probable phleboliths in the pelvis.  There is transitional anatomy at the lumbosacral junction.     Nonspecific bowel gas pattern.  Consider CT if symptoms persist. Authenticated and Electronically Signed by Tiago Reich DO on 03/30/2024 04:54:23 PM     Assessment:    Acute pancreatitis, POA  Epigastric pain, POA  Psoriasis/psoriatic arthritis     Plan:    Patient is admitted for further management and treatment.    Acute pancreatitis  -IV fluids  -Pain control, antiemetics  -Full liquid diet as tolerated, advance as  tolerated  -Patient reports history of elevated triglycerides in the past  -Will check lipid panel in a.m.  -Consider GI consult in a.m.    -Continue home meds as warranted.  -Further orders as indicated per clinical course.    Risk Assessment: Moderate  DVT Prophylaxis: Lovenox prophylaxis (benefit> risk)  Code Status: Full  Diet: Liquid diet    Advance Care Planning   ACP discussion was held with the patient during this visit. Patient does not have an advance directive, information provided.       Nadeem Thurston MD  03/30/24  22:23 EDT    Dictated utilizing Dragon dictation.

## 2024-03-31 NOTE — PROGRESS NOTES
[facility] HOSPITALIST    PROGRESS NOTE    Name:  Ciera Gibson   Age:  39 y.o.  Sex:  male  :  1985  MRN:  [id number]   Visit Number:  [id number]  Admission Date:  3/30/2024  Date Of Service:  24  Primary Care Physician:  Provider, No Known     LOS: 0 days :    Chief Complaint:      Abdominal discomfort    Subjective:    3/31/2024: Persistent abdominal pain minimal oral intake as of yet.  Patient reports history of hypertriglyceridemia noncompliant with regimen to control this prior to admission.  Reports lab was unable to run his triglycerides because they were so high had to send to level pending results.    History Of Presenting Illness:       Patient is a 39 years old male with a past medical history of psoriasis/psoriatic arthritis who presented to the ER with a chief complaint of abdominal pain.  Patient reports that his pain started suddenly in the morning after he had coffee.  Pain was moderate to the left side of his abdomen then became more prominent in the epigastric area.  He reported some nausea but no vomiting.  No fever, chills or changes in bowel movements.  No urinary symptoms.  Patient went to the urgent care and had a KUB and was told that he is likely constipated, he was given GI cocktail and MiraLAX and was instructed to go to the ER if not better or worse.  Patient went home, had 2 bowel movements but his pain started back again so he came into the ER.  Patient never had similar symptoms previously.  Drinks alcohol randomly and former smoker.     On ER evaluation, his vitals were stable and was afebrile.  His labs were significant for creatinine 1.28, lipase 1510, WBC 16.5.  CT abdomen pelvis showed acute pancreatitis.  US gallbladder showed hepatic steatosis and cholelithiasis without cholecystitis. There is no significant biliary ductal dilatation.  Patient received famotidine, Toradol, morphine, Zofran and 1 L bolus of normal saline while in the ER.  Hospitalist  "consulted for admission for pain control.  Edited by: Ross Fitch DO at 3/31/2024 1143     Review of Systems:     All systems were reviewed and negative except as mentioned in subjective, assessment and plan.    Vital Signs:    Temp:  [97.7 °F (36.5 °C)-98.7 °F (37.1 °C)] 98.7 °F (37.1 °C)  Heart Rate:  [71-85] 85  Resp:  [16-20] 18  BP: (123-150)/(72-98) 124/76    Intake and output:    I/O last 3 completed shifts:  In: 821 [P.O.:180; I.V.:641]  Out: -   I/O this shift:  In: 360 [P.O.:360]  Out: -     Physical Examination:    Constitutional: No acute distress, awake, alert  HENT: NCAT, mucous membranes moist  Respiratory: Clear to auscultation bilaterally, respiratory effort normal   Cardiovascular: RRR, no murmurs, rubs, or gallops  Gastrointestinal: Positive bowel sounds, soft, moderately distended moderate to severely tender in the left abdomen with mild voluntary guarding  Musculoskeletal: No bilateral ankle edema  Psychiatric: Appropriate affect, cooperative  Neurologic: Oriented x 3, speech clear  Skin: No rashes  Edited by: Ross Fitch DO at 3/31/2024 1148     Laboratory results:    Results from last 7 days   Lab Units 03/30/24  1842   SODIUM mmol/L 133*   POTASSIUM mmol/L 4.2   CHLORIDE mmol/L 96*   CO2 mmol/L 24.8   BUN mg/dL 18   CREATININE mg/dL 1.28*   CALCIUM mg/dL 9.0   BILIRUBIN mg/dL 0.3   ALK PHOS U/L 63   ALT (SGPT) U/L 5   AST (SGOT) U/L 5   GLUCOSE mg/dL 117*     Results from last 7 days   Lab Units 03/31/24  0525 03/30/24  1842   WBC 10*3/mm3 12.13* 16.59*   HEMOGLOBIN g/dL 13.4 13.9   HEMATOCRIT % 36.8* 40.4   PLATELETS 10*3/mm3 247 230         Results from last 7 days   Lab Units 03/30/24  1842   HSTROP T ng/L <6         No results for input(s): \"PHART\", \"VQI3VFA\", \"PO2ART\", \"VRA2DXO\", \"BASEEXCESS\" in the last 8760 hours.   I have reviewed the patient's laboratory results.    Radiology results:    CT Abdomen Pelvis With Contrast    Result Date: 3/30/2024  FINAL REPORT " TECHNIQUE: Axial CT images were performed from the lung bases through the symphysis pubis after the administration of intravenous contrast.  This study was performed with techniques to keep radiation doses as low as reasonably achievable (ALARA). Individualized dose reduction techniques using automated exposure control or adjustment of mA and/or kV according to the patient's size were employed. CLINICAL HISTORY: epigasttric, LUQ LLQ abdominal pain nausea rule out pancreatitis FINDINGS: LOWER CHEST: The heart is normal size.  The lung bases are clear.  ABDOMEN/PELVIS:  Liver, gallbladder and bile ducts: The liver enhances homogeneously without suspicious focal hepatic lesion.  There are gallstones without cholecystitis.  There is no definite biliary duct dilatation.  Adrenal glands: The adrenal glands are morphologically unremarkable without suspicious lesion.  Kidneys, ureter and urinary bladder: No suspicious renal lesion.  No hydronephrosis.  Urinary bladder is unremarkable.  Spleen: The spleen is normal size.  Pancreas: There is diffuse peripancreatic inflammation.  GI systems and mesentery: No evidence of bowel obstruction.  The appendix is visualized and unremarkable in appearance.  No significant mesenteric inflammation.  Lymph nodes: No definite pathologically enlarged abdominal or pelvic lymph nodes present. Vessels: The aorta and abdominal arteries are grossly patent. The IVC and portal vein are patent and grossly unremarkable. Peritoneum: No free intraperitoneal fluid or pneumoperitoneum. Pelvic viscera: No acute findings.  Body wall: No body wall contusion. No significant body wall hernias.  Bones: No acute fracture.     Impression: Acute pancreatitis. Authenticated and Electronically Signed by Hardik Canela MD on 03/30/2024 09:26:16 PM    US Gallbladder    Result Date: 3/30/2024  FINAL REPORT TECHNIQUE: sonographic images of the right upper quadrant were obtained. CLINICAL HISTORY: gallstones on ct  imaging, pancreatitis, eval for bile duct obstruction or dila FINDINGS: There is a diffusely echogenic liver.  There is no visualized suspicious hepatic lesion.  There is no significant biliary ductal dilatation.  There are gallstones without cholecystitis. The gallbladder is normal.  There is an unremarkable right kidney.  The pancreas is obscured by bowel gas.     Impression: Hepatic steatosis.  Cholelithiasis without cholecystitis. Authenticated and Electronically Signed by Hardik Canela MD on 03/30/2024 09:26:15 PM    XR Abdomen Flat & Upright    Result Date: 3/30/2024  FINAL REPORT CLINICAL HISTORY: gas and abdomen distension COMPARISON: None FINDINGS: SINGLE VIEW ABDOMEN  A single view of the abdomen was obtained. There is a nonspecific bowel gas pattern.  There are probable phleboliths in the pelvis.  There is transitional anatomy at the lumbosacral junction.     Impression: Nonspecific bowel gas pattern.  Consider CT if symptoms persist. Authenticated and Electronically Signed by Tiago Reich DO on 03/30/2024 04:54:23 PM   I have reviewed the patient's radiology reports.    Medication Review:     I have reviewed the patient's active and prn medications.     Problem List:      Acute pancreatitis    Pancreatitis      Assessment/Plan:    Acute pancreatitis  Hypertriglyceridemia    -Transfer to ICU for Dextrose and Insulin.  Continue IV pain control and nausea control.  Correct electrolytes as needed.  Anticipate home in 1 to 2 days.  Continue Lovenox.  Edited by: Ross Fitch DO at 3/31/2024 9473     DVT Prophylaxis: Lovenox  Code Status:   Code Status and Medical Interventions:   Ordered at: 03/30/24 5261     Code Status (Patient has no pulse and is not breathing):    CPR (Attempt to Resuscitate)     Medical Interventions (Patient has pulse or is breathing):    Full Support      Diet:   Dietary Orders (From admission, onward)       Start     Ordered    03/30/24 1021  Diet: Liquid; Full Liquid; Fluid  Consistency: Thin (IDDSI 0)  Diet Effective Now        References:    Diet Order [facility]   Question Answer Comment   Diets: Liquid    Liquid Diet: Full Liquid    Fluid Consistency: Thin (IDDSI 0)        03/30/24 8624                   Discharge Plan: Anticipate home in 1 to 2 days    Ross Fitch DO  03/31/24  11:44 EDT    Dictated utilizing Dragon dictation.

## 2024-03-31 NOTE — PAYOR COMM NOTE
"TO:BC  FROM:SUAD BARROW, RN PHONE [phone]  IN CLINICALS REF# [id number]    Rafy Gibson (39 y.o. Male)       Date of Birth   1985    Social Security Number       Address   [address] [id number]    Home Phone       MRN   [id number]       Congregational   None    Marital Status                               Admission Date   3/30/24    Admission Type   Emergency    Admitting Provider   Nadeem Thurston MD    Attending Provider   Ross Fitch DO    Department, Room/Bed   [facility] TELEMETRY 4, 428/1       Discharge Date       Discharge Disposition       Discharge Destination                                 Attending Provider: Ross Fitch DO    Allergies: No Known Allergies    Isolation: None   Infection: None   Code Status: CPR    Ht: 175.3 cm (69\")   Wt: 113 kg (249 lb 1.9 oz)    Admission Cmt: None   Principal Problem: Acute pancreatitis [K85.90]                   Active Insurance as of 3/30/2024       Primary Coverage       Payor Plan Insurance Group Employer/Plan Group    Critical access hospital BLUE [id number]       Payor Plan Address Payor Plan Phone Number Payor Plan Fax Number Effective Dates    [address]   2008 - None Entered    [place] [address]         Subscriber Name Subscriber Birth Date Member ID       RAFY GIBSON 1985 [id number]                     Emergency Contacts        (Rel.) Home Phone Work Phone Mobile Phone    QUIRINO GIBSON (Spouse) [phone] -- --                 History & Physical        Nadeem Thurston MD at [address] [place] [address] HOSPITALIST   HISTORY AND PHYSICAL      Name:  Rafy Gibson   Age:  39 y.o.  Sex:  male  :  1985  MRN:  [id number]   Visit Number:  [id number]  Admission Date:  3/30/2024  Date Of Service:  24  Primary Care Physician:  Provider, No Known    Chief Complaint:     abdominal pain     History Of Presenting Illness:  "     Patient is a 39 years old male with a past medical history of psoriasis/psoriatic arthritis who presented to the ER with a chief complaint of abdominal pain.  Patient reports that his pain started suddenly in the morning after he had coffee.  Pain was moderate to the left side of his abdomen then became more prominent in the epigastric area.  He reported some nausea but no vomiting.  No fever, chills or changes in bowel movements.  No urinary symptoms.  Patient went to the urgent care and had a KUB and was told that he is likely constipated, he was given GI cocktail and MiraLAX and was instructed to go to the ER if not better or worse.  Patient went home, had 2 bowel movements but his pain started back again so he came into the ER.  Patient never had similar symptoms previously.  Drinks alcohol randomly and former smoker.    On ER evaluation, his vitals were stable and was afebrile.  His labs were significant for creatinine 1.28, lipase 1510, WBC 16.5.  CT abdomen pelvis showed acute pancreatitis.  US gallbladder showed hepatic steatosis and cholelithiasis without cholecystitis. There is no significant biliary ductal dilatation.  Patient received famotidine, Toradol, morphine, Zofran and 1 L bolus of normal saline while in the ER.  Hospitalist consulted for admission for pain control.    Review Of Systems:    All systems were reviewed and negative except as mentioned in history of presenting illness, assessment and plan.    Past Medical History: Patient  has a past medical history of Psoriatic arthritis.    Past Surgical History: Patient  has no past surgical history on file.    Social History: Patient  reports that he has never smoked. He has never used smokeless tobacco. He reports current alcohol use. He reports that he does not use drugs.    Family History:  Patient's family history has been reviewed and found to be noncontributory.     Allergies:      Patient has no known allergies.    Home  "Medications:    Prior to Admission Medications       Prescriptions Last Dose Informant Patient Reported? Taking?    bisacodyl 5 MG EC tablet   No No    Take 1 tablet by mouth Daily As Needed for Constipation.    Enbrel SureClick 50 MG/ML solution auto-injector   Yes No    polyethylene glycol (MIRALAX) 17 GM/SCOOP powder   No No    17 g po daily prn constipaton          ED Medications:    Medications   sodium chloride 0.9 % flush 10 mL (has no administration in time range)   Morphine sulfate (PF) injection 2 mg (has no administration in time range)   sodium chloride 0.9 % bolus 1,000 mL (0 mL Intravenous Stopped 3/30/24 2011)   ketorolac (TORADOL) injection 15 mg (15 mg Intravenous Given 3/30/24 1904)   famotidine (PEPCID) injection 20 mg (20 mg Intravenous Given 3/30/24 1907)   ondansetron (ZOFRAN) injection 4 mg (4 mg Intravenous Given 3/30/24 1906)   iopamidol (ISOVUE-300) 61 % injection 100 mL (100 mL Intravenous Given 3/30/24 1937)   morphine injection 4 mg (4 mg Intravenous Given 3/30/24 2126)     Vital Signs:  Temp:  [98 °F (36.7 °C)-98.3 °F (36.8 °C)] 98 °F (36.7 °C)  Heart Rate:  [71-84] 71  Resp:  [16-18] 16  BP: (128-142)/(82-98) 136/85        03/30/24  1835   Weight: 118 kg (260 lb)     Body mass index is 38.4 kg/m².    Physical Exam:     Most recent vital Signs: /85   Pulse 71   Temp 98 °F (36.7 °C)   Resp 16   Ht 175.3 cm (69\")   Wt 118 kg (260 lb)   SpO2 94%   BMI 38.40 kg/m²     Physical Exam  Vitals and nursing note reviewed.   Constitutional:       General: He is not in acute distress.     Appearance: He is ill-appearing.   HENT:      Head: Normocephalic and atraumatic.      Right Ear: External ear normal.      Left Ear: External ear normal.      Nose: Nose normal.      Mouth/Throat:      Mouth: Mucous membranes are moist.   Eyes:      Extraocular Movements: Extraocular movements intact.      Conjunctiva/sclera: Conjunctivae normal.      Pupils: Pupils are equal, round, and reactive to " light.   Cardiovascular:      Rate and Rhythm: Normal rate and regular rhythm.      Pulses: Normal pulses.      Heart sounds: Normal heart sounds.   Pulmonary:      Effort: Pulmonary effort is normal.      Breath sounds: Normal breath sounds. No wheezing or rhonchi.   Abdominal:      General: Bowel sounds are normal. There is no distension.      Palpations: Abdomen is soft.      Tenderness: There is abdominal tenderness in the epigastric area. There is no guarding or rebound.   Musculoskeletal:         General: Normal range of motion.      Cervical back: Normal range of motion and neck supple.      Right lower leg: No edema.      Left lower leg: No edema.   Skin:     General: Skin is warm and dry.      Findings: Rash present.      Comments: Psoriasis patches over bilateral elbows   Neurological:      General: No focal deficit present.      Mental Status: He is alert and oriented to person, place, and time. Mental status is at baseline.      Motor: No weakness.   Psychiatric:         Mood and Affect: Mood normal.         Behavior: Behavior normal.         Laboratory data:    I have reviewed the labs done in the emergency room.    Results from last 7 days   Lab Units 03/30/24  1842   SODIUM mmol/L 133*   POTASSIUM mmol/L 4.2   CHLORIDE mmol/L 96*   CO2 mmol/L 24.8   BUN mg/dL 18   CREATININE mg/dL 1.28*   CALCIUM mg/dL 9.0   BILIRUBIN mg/dL 0.3   ALK PHOS U/L 63   ALT (SGPT) U/L 5   AST (SGOT) U/L 5   GLUCOSE mg/dL 117*     Results from last 7 days   Lab Units 03/30/24  1842   WBC 10*3/mm3 16.59*   HEMOGLOBIN g/dL 13.9   HEMATOCRIT % 40.4   PLATELETS 10*3/mm3 230         Results from last 7 days   Lab Units 03/30/24  1842   HSTROP T ng/L <6             Results from last 7 days   Lab Units 03/30/24  1842   LIPASE U/L 1,510*         Results from last 7 days   Lab Units 03/30/24  1948   COLOR UA  Yellow   GLUCOSE UA  Negative   KETONES UA  Negative   BLOOD UA  Negative   LEUKOCYTES UA  Negative   PH, URINE  5.5    BILIRUBIN UA  Negative   UROBILINOGEN UA  0.2 E.U./dL   RBC UA /HPF 0-2   WBC UA /HPF None Seen       Pain Management Panel           No data to display                EKG:      Sinus rhythm, heart rate 71, nonspecific ST/wave changes.    Radiology:    CT Abdomen Pelvis With Contrast    Result Date: 3/30/2024  FINAL REPORT TECHNIQUE: Axial CT images were performed from the lung bases through the symphysis pubis after the administration of intravenous contrast.  This study was performed with techniques to keep radiation doses as low as reasonably achievable (ALARA). Individualized dose reduction techniques using automated exposure control or adjustment of mA and/or kV according to the patient's size were employed. CLINICAL HISTORY: epigasttric, LUQ LLQ abdominal pain nausea rule out pancreatitis FINDINGS: LOWER CHEST: The heart is normal size.  The lung bases are clear.  ABDOMEN/PELVIS:  Liver, gallbladder and bile ducts: The liver enhances homogeneously without suspicious focal hepatic lesion.  There are gallstones without cholecystitis.  There is no definite biliary duct dilatation.  Adrenal glands: The adrenal glands are morphologically unremarkable without suspicious lesion.  Kidneys, ureter and urinary bladder: No suspicious renal lesion.  No hydronephrosis.  Urinary bladder is unremarkable.  Spleen: The spleen is normal size.  Pancreas: There is diffuse peripancreatic inflammation.  GI systems and mesentery: No evidence of bowel obstruction.  The appendix is visualized and unremarkable in appearance.  No significant mesenteric inflammation.  Lymph nodes: No definite pathologically enlarged abdominal or pelvic lymph nodes present. Vessels: The aorta and abdominal arteries are grossly patent. The IVC and portal vein are patent and grossly unremarkable. Peritoneum: No free intraperitoneal fluid or pneumoperitoneum. Pelvic viscera: No acute findings.  Body wall: No body wall contusion. No significant body wall  hernias.  Bones: No acute fracture.     Acute pancreatitis. Authenticated and Electronically Signed by Hardik Canela MD on 03/30/2024 09:26:16 PM    US Gallbladder    Result Date: 3/30/2024  FINAL REPORT TECHNIQUE: sonographic images of the right upper quadrant were obtained. CLINICAL HISTORY: gallstones on ct imaging, pancreatitis, eval for bile duct obstruction or dila FINDINGS: There is a diffusely echogenic liver.  There is no visualized suspicious hepatic lesion.  There is no significant biliary ductal dilatation.  There are gallstones without cholecystitis. The gallbladder is normal.  There is an unremarkable right kidney.  The pancreas is obscured by bowel gas.     Hepatic steatosis.  Cholelithiasis without cholecystitis. Authenticated and Electronically Signed by Hardik Canela MD on 03/30/2024 09:26:15 PM    XR Abdomen Flat & Upright    Result Date: 3/30/2024  FINAL REPORT CLINICAL HISTORY: gas and abdomen distension COMPARISON: None FINDINGS: SINGLE VIEW ABDOMEN  A single view of the abdomen was obtained. There is a nonspecific bowel gas pattern.  There are probable phleboliths in the pelvis.  There is transitional anatomy at the lumbosacral junction.     Nonspecific bowel gas pattern.  Consider CT if symptoms persist. Authenticated and Electronically Signed by Tiago Reich DO on 03/30/2024 04:54:23 PM     Assessment:    Acute pancreatitis, POA  Epigastric pain, POA  Psoriasis/psoriatic arthritis     Plan:    Patient is admitted for further management and treatment.    Acute pancreatitis  -IV fluids  -Pain control, antiemetics  -Full liquid diet as tolerated, advance as tolerated  -Patient reports history of elevated triglycerides in the past  -Will check lipid panel in a.m.  -Consider GI consult in a.m.    -Continue home meds as warranted.  -Further orders as indicated per clinical course.    Risk Assessment: Moderate  DVT Prophylaxis: Lovenox prophylaxis (benefit> risk)  Code Status: Full  Diet: Liquid  diet    Advance Care Planning  ACP discussion was held with the patient during this visit. Patient does not have an advance directive, information provided.       Nadeem Thurston MD  03/30/24  22:23 EDT    Dictated utilizing Dragon dictation.    Electronically signed by Nadeem Thurston MD at 03/30/24 2350          Emergency Department Notes        Shahla Gonzalez, RN at 03/30/24 2242          [facility]     Electronically signed by Shahla Gonzalez RN at 03/30/24 2242       Dwain Vizcaino PA-C at 03/30/24 1918          Subjective  History of Present Illness:    This is a 39-year-old male present emergency room today for evaluation of abdominal pain.  Reports left lower and left upper quadrant in nature.  No history of pancreatitis.  He denies sniffing and alcohol use.  No fevers.  Went to urgent care earlier had a KUB performed and they told him that he was likely constipated, he was given a GI cocktail and MiraLAX and had a bowel movement at the urgent care that was normal per him.  No hematochezia no melena.  Some nausea, no vomiting.  No dysuria hematuria urgency or frequency.  No chest pain no shortness of air.  Has a history of reflux/heartburn but reports this feels different.      Nurses Notes reviewed and agree, including vitals, allergies, social history and prior medical history.     REVIEW OF SYSTEMS: All systems reviewed and not pertinent unless noted.  Review of Systems   Constitutional:  Negative for fever.   Respiratory:  Negative for shortness of breath.    Cardiovascular:  Negative for chest pain.   Gastrointestinal:  Positive for abdominal pain and nausea. Negative for diarrhea and vomiting.   Genitourinary:  Negative for dysuria, flank pain, frequency, hematuria, testicular pain and urgency.   All other systems reviewed and are negative.      Past Medical History:   Diagnosis Date    Psoriatic arthritis        Allergies:    Patient has no known allergies.      History reviewed. No  "pertinent surgical history.      Social History     Socioeconomic History    Marital status:    Tobacco Use    Smoking status: Never    Smokeless tobacco: Never   Vaping Use    Vaping status: Never Used   Substance and Sexual Activity    Alcohol use: Yes    Drug use: Never    Sexual activity: Defer         History reviewed. No pertinent family history.    Objective  Physical Exam:  /85   Pulse 76   Temp 98 °F (36.7 °C)   Resp 16   Ht 175.3 cm (69\")   Wt 118 kg (260 lb)   SpO2 93%   BMI 38.40 kg/m²      Physical Exam  Vitals and nursing note reviewed.   Constitutional:       General: He is not in acute distress.     Appearance: He is obese. He is not ill-appearing, toxic-appearing or diaphoretic.   HENT:      Head: Normocephalic and atraumatic.      Mouth/Throat:      Mouth: Mucous membranes are moist.      Pharynx: Oropharynx is clear.   Eyes:      Extraocular Movements: Extraocular movements intact.   Cardiovascular:      Rate and Rhythm: Normal rate and regular rhythm.      Heart sounds: Normal heart sounds.   Pulmonary:      Effort: Pulmonary effort is normal.      Breath sounds: Normal breath sounds.   Abdominal:      General: Abdomen is protuberant. Bowel sounds are normal.      Palpations: Abdomen is soft.      Tenderness: There is generalized abdominal tenderness and tenderness in the epigastric area and left upper quadrant. There is no guarding or rebound.      Hernia: No hernia is present.   Skin:     General: Skin is warm and dry.      Capillary Refill: Capillary refill takes less than 2 seconds.   Neurological:      General: No focal deficit present.      Mental Status: He is alert and oriented to person, place, and time.   Psychiatric:         Mood and Affect: Mood normal. Mood is not anxious.         Behavior: Behavior normal.           Procedures    ED Course:    ED Course as of 03/30/24 2218   Sat Mar 30, 2024   1955 ATTENDING ATTESTATION  HPI: 39-year-old male presents with " left-sided abdominal pain for the past several days.    MDM: ED workup reviewed.  CBC shows leukocytosis.  Lipase 1500.  CMP clinically unremarkable.  CT abdomen pelvis/ultrasound gallbladder pending.     [JS]   2217 Lipase(!): 1,510 [JR]   2217 CT Abdomen Pelvis With Contrast [JR]   2217 US Gallbladder [JR]      ED Course User Index  [JR] Dwain Vizcaino PA-C  [JS] Ollie Abarca DO       Lab Results (last 24 hours)       Procedure Component Value Units Date/Time    CBC & Differential [[id number]]  (Abnormal) Collected: 03/30/24 1842    Specimen: Blood Updated: 03/30/24 1919    Narrative:      The following orders were created for panel order CBC & Differential.  Procedure                               Abnormality         Status                     ---------                               -----------         ------                     CBC Auto Differential[[id number]]        Abnormal            Final result                 Please view results for these tests on the individual orders.    Comprehensive Metabolic Panel [[id number]]  (Abnormal) Collected: 03/30/24 1842    Specimen: Blood Updated: 03/30/24 1927     Glucose 117 mg/dL      BUN 18 mg/dL      Creatinine 1.28 mg/dL      Sodium 133 mmol/L      Potassium 4.2 mmol/L      Comment: Slight hemolysis detected by analyzer. Result may be falsely elevated.        Chloride 96 mmol/L      CO2 24.8 mmol/L      Calcium 9.0 mg/dL      Total Protein 6.3 g/dL      Albumin 4.6 g/dL      ALT (SGPT) 5 U/L      AST (SGOT) 5 U/L      Comment: Slight hemolysis detected by analyzer. Result may be falsely elevated.        Alkaline Phosphatase 63 U/L      Total Bilirubin 0.3 mg/dL      Globulin 1.7 gm/dL      A/G Ratio 2.7 g/dL      BUN/Creatinine Ratio 14.1     Anion Gap 12.2 mmol/L      eGFR 72.6 mL/min/1.73     Narrative:      GFR Normal >60  Chronic Kidney Disease <60  Kidney Failure <15    Specimen extremely lipemic, manual entry    Lipase [[id number]]  (Abnormal) Collected:  03/30/24 1842    Specimen: Blood Updated: 03/30/24 1923     Lipase 1,510 U/L     Single High Sensitivity Troponin T [[id number]]  (Normal) Collected: 03/30/24 1842    Specimen: Blood Updated: 03/30/24 1916     HS Troponin T <6 ng/L      Comment: Specimen hemolyzed.  Results may be falsely decreased.       Narrative:      Specimen extremely lipemic  High Sensitive Troponin T Reference Range:  <14.0 ng/L- Negative Female for AMI  <22.0 ng/L- Negative Male for AMI  >=14 - Abnormal Female indicating possible myocardial injury.  >=22 - Abnormal Male indicating possible myocardial injury.   Clinicians would have to utilize clinical acumen, EKG, Troponin, and serial changes to determine if it is an Acute Myocardial Infarction or myocardial injury due to an underlying chronic condition.         CBC Auto Differential [[id number]]  (Abnormal) Collected: 03/30/24 1842    Specimen: Blood Updated: 03/30/24 1919     WBC 16.59 10*3/mm3      RBC 4.57 10*6/mm3      Hemoglobin 13.9 g/dL      Hematocrit 40.4 %      MCV 88.4 fL      MCH 30.4 pg      MCHC 34.4 g/dL      RDW 13.7 %      RDW-SD 44.0 fl      MPV 9.5 fL      Platelets 230 10*3/mm3      Neutrophil % 74.5 %      Lymphocyte % 18.6 %      Monocyte % 5.1 %      Eosinophil % 0.5 %      Basophil % 0.4 %      Immature Grans % 0.9 %      Neutrophils, Absolute 12.36 10*3/mm3      Lymphocytes, Absolute 3.09 10*3/mm3      Monocytes, Absolute 0.84 10*3/mm3      Eosinophils, Absolute 0.08 10*3/mm3      Basophils, Absolute 0.07 10*3/mm3      Immature Grans, Absolute 0.15 10*3/mm3      nRBC 0.2 /100 WBC     Lactic Acid, Plasma [[id number]]  (Normal) Collected: 03/30/24 1900    Specimen: Blood Updated: 03/30/24 1919     Lactate 1.1 mmol/L     Urinalysis With Microscopic If Indicated (No Culture) - Urine, Clean Catch [[id number]]  (Abnormal) Collected: 03/30/24 1948    Specimen: Urine, Clean Catch Updated: 03/30/24 1956     Color, UA Yellow     Appearance, UA Clear     pH, UA 5.5     Specific  Gravity, UA >=1.030     Glucose, UA Negative     Ketones, UA Negative     Bilirubin, UA Negative     Blood, UA Negative     Protein, UA 30 mg/dL (1+)     Leuk Esterase, UA Negative     Nitrite, UA Negative     Urobilinogen, UA 0.2 E.U./dL    Urinalysis, Microscopic Only - Urine, Clean Catch [[id number]] Collected: 03/30/24 1948    Specimen: Urine, Clean Catch Updated: 03/30/24 2000     RBC, UA 0-2 /HPF      WBC, UA None Seen /HPF      Bacteria, UA None Seen /HPF      Squamous Epithelial Cells, UA None Seen /HPF      Hyaline Casts, UA None Seen /LPF      Methodology Manual Light Microscopy             CT Abdomen Pelvis With Contrast    Result Date: 3/30/2024  FINAL REPORT TECHNIQUE: Axial CT images were performed from the lung bases through the symphysis pubis after the administration of intravenous contrast.  This study was performed with techniques to keep radiation doses as low as reasonably achievable (ALARA). Individualized dose reduction techniques using automated exposure control or adjustment of mA and/or kV according to the patient's size were employed. CLINICAL HISTORY: epigasttric, LUQ LLQ abdominal pain nausea rule out pancreatitis FINDINGS: LOWER CHEST: The heart is normal size.  The lung bases are clear.  ABDOMEN/PELVIS:  Liver, gallbladder and bile ducts: The liver enhances homogeneously without suspicious focal hepatic lesion.  There are gallstones without cholecystitis.  There is no definite biliary duct dilatation.  Adrenal glands: The adrenal glands are morphologically unremarkable without suspicious lesion.  Kidneys, ureter and urinary bladder: No suspicious renal lesion.  No hydronephrosis.  Urinary bladder is unremarkable.  Spleen: The spleen is normal size.  Pancreas: There is diffuse peripancreatic inflammation.  GI systems and mesentery: No evidence of bowel obstruction.  The appendix is visualized and unremarkable in appearance.  No significant mesenteric inflammation.  Lymph nodes: No  definite pathologically enlarged abdominal or pelvic lymph nodes present. Vessels: The aorta and abdominal arteries are grossly patent. The IVC and portal vein are patent and grossly unremarkable. Peritoneum: No free intraperitoneal fluid or pneumoperitoneum. Pelvic viscera: No acute findings.  Body wall: No body wall contusion. No significant body wall hernias.  Bones: No acute fracture.     Impression: Acute pancreatitis. Authenticated and Electronically Signed by Hardik Canela MD on 03/30/2024 09:26:16 PM    US Gallbladder    Result Date: 3/30/2024  FINAL REPORT TECHNIQUE: sonographic images of the right upper quadrant were obtained. CLINICAL HISTORY: gallstones on ct imaging, pancreatitis, eval for bile duct obstruction or dila FINDINGS: There is a diffusely echogenic liver.  There is no visualized suspicious hepatic lesion.  There is no significant biliary ductal dilatation.  There are gallstones without cholecystitis. The gallbladder is normal.  There is an unremarkable right kidney.  The pancreas is obscured by bowel gas.     Impression: Hepatic steatosis.  Cholelithiasis without cholecystitis. Authenticated and Electronically Signed by Hardik Canela MD on 03/30/2024 09:26:15 PM    XR Abdomen Flat & Upright    Result Date: 3/30/2024  FINAL REPORT CLINICAL HISTORY: gas and abdomen distension COMPARISON: None FINDINGS: SINGLE VIEW ABDOMEN  A single view of the abdomen was obtained. There is a nonspecific bowel gas pattern.  There are probable phleboliths in the pelvis.  There is transitional anatomy at the lumbosacral junction.     Impression: Nonspecific bowel gas pattern.  Consider CT if symptoms persist. Authenticated and Electronically Signed by Tiago Reich DO on 03/30/2024 04:54:23 PM        MDM     Amount and/or Complexity of Data Reviewed  Clinical lab tests: reviewed  Tests in the radiology section of CPT®: reviewed  Tests in the medicine section of CPT®: reviewed        Initial impression of  presenting illness: This is a 39-year-old male present emergency room today for evaluation of abdominal pain x 1 day.  Seen at the urgent care earlier.  KUB revealed nonspecific bowel gas pattern    DDX: includes but is not limited to: Enteritis, gastroenteritis, colitis, pancreatitis, gallbladder pathology, diverticulitis, constipation, bowel obstruction, others    Patient arrives hemodynamically stable afebrile nontachycardic nonhypoxic nontoxic-appearing with vitals interpreted by myself.     Pertinent features from physical exam: Abdomen is soft but he does have tenderness generalized, left upper or left lower quadrant.  Additionally has some tenderness palpation epigastric region.  Cardiac auscultation regular and rhythm lungs clear.  Oropharynx clear..  No obvious incarcerated hernia to the abdomen    Initial diagnostic plan: CBC CMP lipase troponin lactic acid urinalysis CT abdomen pelvis with contrast    Results from initial plan were reviewed and interpreted by me revealing CBC reveals leukocytosis 16.59.  Creatinine 1.28, sodium of 133, glucose of 117.  Lipase is elevated at 1510, lactic is normal.  Based on his laboratory study prior to CTM suspicious for pancreatitis given his physical exam and commendation with the patient's lipase.  CT scan confirms diagnosis of acute pancreatitis with Benja pancreatic inflammation, as well as gallstones.  Right upper quadrant ultrasound does not show any common bile duct dilatation but does show gallstones.  No cholecystitis identified on CT scan or ultrasound per radiology.  EKG sinus rhythm rate of 71.  There is no obstructive pattern on CMP between his transaminases and bilirubin to suggest obstructive pattern.  Urinalysis negative for infection.  Diagnostic information from other sources: Record reviewed including KUB from urgent care visit earlier this date    Interventions / Re-evaluation: Pepcid Zofran Toradol 1 L fluids, patient received 6 of IV morphine,  having continued intractable pain.    Results/clinical rationale were discussed with him at bedside, discussed likely gallstone induced pancreatitis.  He is agreeable to admission given intractable pain.    Consultations/Discussion of results with other physicians: Discussed with hospitalist for admission, dr. Thurston, will admit to observation level of care [facility].    Disposition plan: Admit for intractable pain, acute pancreatitis, [facility].  -----    Final diagnoses:   Acute pancreatitis, unspecified complication status, unspecified pancreatitis type   Leukocytosis, unspecified type   Gallstones   Intractable pain          Dwain Vizcaino PA-C  03/30/24 2218       Dwain Vizcaino PA-C  03/30/24 2219      Electronically signed by Dwain Vizcaino PA-C at 03/30/24 2219       Vital Signs (last day)       Date/Time Temp Temp src Pulse Resp BP Patient Position SpO2    03/31/24 0710 98.4 (36.9) Oral 85 18 124/76 Lying 95    03/31/24 0454 98 (36.7) Oral 78 16 123/78 Lying 95    03/30/24 2327 97.7 (36.5) Oral 77 20 150/89 Lying 95    03/30/24 2300 -- -- 71 -- 129/72 -- 92    03/30/24 2230 -- -- 73 -- 131/83 -- 96    03/30/24 2215 -- -- 71 -- 136/85 -- 94    03/30/24 2200 -- -- 76 -- 135/85 -- 93    03/30/24 2145 -- -- 74 -- 132/90 -- 94    03/30/24 2129 -- -- 75 -- 128/82 -- 97    03/30/24 1900 -- -- 80 -- 139/95 -- 95    03/30/24 1840 -- -- -- -- 141/98 -- --    03/30/24 1835 98 (36.7) -- 84 16 140/97 -- 95          Current Facility-Administered Medications   Medication Dose Route Frequency Provider Last Rate Last Admin    acetaminophen (TYLENOL) tablet 650 mg  650 mg Oral Q4H PRN Nadeem Thurston MD        Or    acetaminophen (TYLENOL) 160 MG/5ML oral solution 650 mg  650 mg Oral Q4H PRN Nadeem Thurston MD        Or    acetaminophen (TYLENOL) suppository 650 mg  650 mg Rectal Q4H PRN Nadeem Thurston MD        sennosides-docusate (PERICOLACE) 8.6-50 MG per tablet 2 tablet  2 tablet Oral BID PRN  Nadeem Thurston MD        And    polyethylene glycol (MIRALAX) packet 17 g  17 g Oral Daily PRN Nadeem Thurston MD        And    bisacodyl (DULCOLAX) EC tablet 5 mg  5 mg Oral Daily PRN Nadeem Thurston MD        And    bisacodyl (DULCOLAX) suppository 10 mg  10 mg Rectal Daily PRN Nadeem Thurston MD        Enoxaparin Sodium (LOVENOX) syringe 40 mg  40 mg Subcutaneous Nightly Nadeem Thurston MD   40 mg at 03/31/24 0007    Magnesium Standard Dose Replacement - Follow Nurse / BPA Driven Protocol   Does not apply PRN Nadeem Thurston MD        melatonin tablet 5 mg  5 mg Oral Nightly PRN Nadeem Thurston MD        Morphine sulfate (PF) injection 2 mg  2 mg Intravenous Q2H PRN Nadeem Thurston MD   2 mg at 03/31/24 0509    And    naloxone (NARCAN) injection 0.4 mg  0.4 mg Intravenous Q5 Min PRN Nadeem Thurston MD        ondansetron (ZOFRAN) injection 4 mg  4 mg Intravenous Q6H PRN Nadeem Thurston MD        Pharmacy to Dose enoxaparin (LOVENOX)   Does not apply Continuous PRN Nadeem Thurston MD        Potassium Replacement - Follow Nurse / BPA Driven Protocol   Does not apply PRN Nadeem Thurston MD        sodium chloride 0.9 % flush 10 mL  10 mL Intravenous PRN Nadeem Thurston MD        sodium chloride 0.9 % flush 10 mL  10 mL Intravenous Q12H Nadeem Thurston MD   10 mL at 03/31/24 0009    sodium chloride 0.9 % flush 10 mL  10 mL Intravenous PRN Nadeem Thurston MD        sodium chloride 0.9 % infusion 40 mL  40 mL Intravenous PRN Nadeem Thurston MD        sodium chloride 0.9 % infusion  125 mL/hr Intravenous Continuous Nadeem Thurston  mL/hr at 03/31/24 0501 125 mL/hr at 03/31/24 0501     Lab Results (last 24 hours)       Procedure Component Value Units Date/Time    Comprehensive Metabolic Panel [[id number]] Updated: 03/31/24 0634    Specimen: Blood     Lipase [[id number]] Updated: 03/31/24 0634    Specimen: Blood     Lipid Panel [[id number]] Updated: 03/31/24 0634    Specimen: Blood     CBC (No Diff)  [[id number]]  (Abnormal) Collected: 03/31/24 0525    Specimen: Blood Updated: 03/31/24 0614     WBC 12.13 10*3/mm3      RBC 4.09 10*6/mm3      Hemoglobin 13.4 g/dL      Hematocrit 36.8 %      MCV 90.0 fL      MCH 32.8 pg      MCHC 36.4 g/dL      RDW 14.0 %      RDW-SD 45.6 fl      MPV 9.6 fL      Platelets 247 10*3/mm3     Urinalysis, Microscopic Only - Urine, Clean Catch [[id number]] Collected: 03/30/24 1948    Specimen: Urine, Clean Catch Updated: 03/30/24 2000     RBC, UA 0-2 /HPF      WBC, UA None Seen /HPF      Bacteria, UA None Seen /HPF      Squamous Epithelial Cells, UA None Seen /HPF      Hyaline Casts, UA None Seen /LPF      Methodology Manual Light Microscopy    Urinalysis With Microscopic If Indicated (No Culture) - Urine, Clean Catch [[id number]]  (Abnormal) Collected: 03/30/24 1948    Specimen: Urine, Clean Catch Updated: 03/30/24 1956     Color, UA Yellow     Appearance, UA Clear     pH, UA 5.5     Specific Gravity, UA >=1.030     Glucose, UA Negative     Ketones, UA Negative     Bilirubin, UA Negative     Blood, UA Negative     Protein, UA 30 mg/dL (1+)     Leuk Esterase, UA Negative     Nitrite, UA Negative     Urobilinogen, UA 0.2 E.U./dL    Filer Draw [[id number]] Collected: 03/30/24 1842    Specimen: Blood Updated: 03/30/24 1946    Narrative:      The following orders were created for panel order Filer Draw.  Procedure                               Abnormality         Status                     ---------                               -----------         ------                     Green Top (Gel)[[id number]]                                  Final result               Lavender Top[[id number]]                                     Final result               Gold Top - SST[[id number]]                                   Final result               Light Blue Top[[id number]]                                   Final result                 Please view results for these tests on the individual orders.    Green Top  (Gel) [[id number]] Collected: 03/30/24 1842    Specimen: Blood Updated: 03/30/24 1946     Extra Tube Hold for add-ons.     Comment: Auto resulted.       Lavender Top [[id number]] Collected: 03/30/24 1842    Specimen: Blood Updated: 03/30/24 1946     Extra Tube hold for add-on     Comment: Auto resulted       Gold Top - SST [[id number]] Collected: 03/30/24 1842    Specimen: Blood Updated: 03/30/24 1946     Extra Tube Hold for add-ons.     Comment: Auto resulted.       Light Blue Top [[id number]] Collected: 03/30/24 1842    Specimen: Blood Updated: 03/30/24 1946     Extra Tube Hold for add-ons.     Comment: Auto resulted       Comprehensive Metabolic Panel [[id number]]  (Abnormal) Collected: 03/30/24 1842    Specimen: Blood Updated: 03/30/24 1927     Glucose 117 mg/dL      BUN 18 mg/dL      Creatinine 1.28 mg/dL      Sodium 133 mmol/L      Potassium 4.2 mmol/L      Comment: Slight hemolysis detected by analyzer. Result may be falsely elevated.        Chloride 96 mmol/L      CO2 24.8 mmol/L      Calcium 9.0 mg/dL      Total Protein 6.3 g/dL      Albumin 4.6 g/dL      ALT (SGPT) 5 U/L      AST (SGOT) 5 U/L      Comment: Slight hemolysis detected by analyzer. Result may be falsely elevated.        Alkaline Phosphatase 63 U/L      Total Bilirubin 0.3 mg/dL      Globulin 1.7 gm/dL      A/G Ratio 2.7 g/dL      BUN/Creatinine Ratio 14.1     Anion Gap 12.2 mmol/L      eGFR 72.6 mL/min/1.73     Narrative:      GFR Normal >60  Chronic Kidney Disease <60  Kidney Failure <15    Specimen extremely lipemic, manual entry    Lipase [[id number]]  (Abnormal) Collected: 03/30/24 1842    Specimen: Blood Updated: 03/30/24 1923     Lipase 1,510 U/L     CBC & Differential [[id number]]  (Abnormal) Collected: 03/30/24 1842    Specimen: Blood Updated: 03/30/24 1919    Narrative:      The following orders were created for panel order CBC & Differential.  Procedure                               Abnormality         Status                     ---------                                -----------         ------                     CBC Auto Differential[[id number]]        Abnormal            Final result                 Please view results for these tests on the individual orders.    CBC Auto Differential [[id number]]  (Abnormal) Collected: 03/30/24 1842    Specimen: Blood Updated: 03/30/24 1919     WBC 16.59 10*3/mm3      RBC 4.57 10*6/mm3      Hemoglobin 13.9 g/dL      Hematocrit 40.4 %      MCV 88.4 fL      MCH 30.4 pg      MCHC 34.4 g/dL      RDW 13.7 %      RDW-SD 44.0 fl      MPV 9.5 fL      Platelets 230 10*3/mm3      Neutrophil % 74.5 %      Lymphocyte % 18.6 %      Monocyte % 5.1 %      Eosinophil % 0.5 %      Basophil % 0.4 %      Immature Grans % 0.9 %      Neutrophils, Absolute 12.36 10*3/mm3      Lymphocytes, Absolute 3.09 10*3/mm3      Monocytes, Absolute 0.84 10*3/mm3      Eosinophils, Absolute 0.08 10*3/mm3      Basophils, Absolute 0.07 10*3/mm3      Immature Grans, Absolute 0.15 10*3/mm3      nRBC 0.2 /100 WBC     Lactic Acid, Plasma [[id number]]  (Normal) Collected: 03/30/24 1900    Specimen: Blood Updated: 03/30/24 1919     Lactate 1.1 mmol/L     Single High Sensitivity Troponin T [[id number]]  (Normal) Collected: 03/30/24 1842    Specimen: Blood Updated: 03/30/24 1916     HS Troponin T <6 ng/L      Comment: Specimen hemolyzed.  Results may be falsely decreased.       Narrative:      Specimen extremely lipemic  High Sensitive Troponin T Reference Range:  <14.0 ng/L- Negative Female for AMI  <22.0 ng/L- Negative Male for AMI  >=14 - Abnormal Female indicating possible myocardial injury.  >=22 - Abnormal Male indicating possible myocardial injury.   Clinicians would have to utilize clinical acumen, EKG, Troponin, and serial changes to determine if it is an Acute Myocardial Infarction or myocardial injury due to an underlying chronic condition.               Imaging Results (Last 24 Hours)       Procedure Component Value Units Date/Time    US Gallbladder  [[id number]] Collected: 03/30/24 2051     Updated: 03/30/24 2127    Narrative:      FINAL REPORT    TECHNIQUE:  sonographic images of the right upper quadrant were obtained.    CLINICAL HISTORY:  gallstones on ct imaging, pancreatitis, eval for bile duct  obstruction or dila    FINDINGS:  There is a diffusely echogenic liver.  There is no visualized  suspicious hepatic lesion.  There is no significant biliary  ductal dilatation.  There are gallstones without cholecystitis.  The gallbladder is normal.  There is an unremarkable right  kidney.  The pancreas is obscured by bowel gas.      Impression:      Hepatic steatosis.  Cholelithiasis without cholecystitis.    Authenticated and Electronically Signed by Hardik Canela MD on  03/30/2024 09:26:15 PM    CT Abdomen Pelvis With Contrast [[id number]] Collected: 03/30/24 2050     Updated: 03/30/24 2127    Narrative:      FINAL REPORT    TECHNIQUE:  Axial CT images were performed from the lung bases through the  symphysis pubis after the administration of intravenous  contrast.  This study was performed with techniques to keep  radiation doses as low as reasonably achievable (ALARA).  Individualized dose reduction techniques using automated  exposure control or adjustment of mA and/or kV according to the  patient's size were employed.    CLINICAL HISTORY:  epigasttric, LUQ LLQ abdominal pain nausea rule out pancreatitis    FINDINGS:  LOWER CHEST: The heart is normal size.  The lung bases are  clear.  ABDOMEN/PELVIS:  Liver, gallbladder and bile ducts: The  liver enhances homogeneously without suspicious focal hepatic  lesion.  There are gallstones without cholecystitis.  There is  no definite biliary duct dilatation.  Adrenal glands: The  adrenal glands are morphologically unremarkable without  suspicious lesion.  Kidneys, ureter and urinary bladder: No  suspicious renal lesion.  No hydronephrosis.  Urinary bladder is  unremarkable.  Spleen: The spleen is normal size.   Pancreas:  There is diffuse peripancreatic inflammation.  GI systems and  mesentery: No evidence of bowel obstruction.  The appendix is  visualized and unremarkable in appearance.  No significant  mesenteric inflammation.  Lymph nodes: No definite  pathologically enlarged abdominal or pelvic lymph nodes present.  Vessels: The aorta and abdominal arteries are grossly patent.  The IVC and portal vein are patent and grossly unremarkable.  Peritoneum: No free intraperitoneal fluid or pneumoperitoneum.  Pelvic viscera: No acute findings.  Body wall: No body wall  contusion. No significant body wall hernias.  Bones: No acute  fracture.      Impression:      Acute pancreatitis.    Authenticated and Electronically Signed by Hardik Canela MD on  03/30/2024 09:26:16 PM          Physician Progress Notes (last 24 hours)  Notes from 03/30/24 0749 through 03/31/24 0749   No notes of this type exist for this encounter.

## 2024-03-31 NOTE — CASE MANAGEMENT/SOCIAL WORK
Discharge Planning Assessment   [facility]     Patient Name: Ciera Gibson  MRN: [id number]  Today's Date: 3/31/2024    Admit Date: 3/30/2024    Plan: Home with family   Discharge Needs Assessment       Row Name 03/31/24 1035       Living Environment    People in Home spouse;child(young), dependent    Current Living Arrangements home    Potentially Unsafe Housing Conditions none    In the past 12 months has the electric, gas, oil, or water company threatened to shut off services in your home? No    Primary Care Provided by self    Provides Primary Care For no one    Able to Return to Prior Arrangements yes       Resource/Environmental Concerns    Resource/Environmental Concerns none    Transportation Concerns none       Transportation Needs    In the past 12 months, has lack of transportation kept you from medical appointments or from getting medications? no    In the past 12 months, has lack of transportation kept you from meetings, work, or from getting things needed for daily living? No       Food Insecurity    Within the past 12 months, you worried that your food would run out before you got the money to buy more. Never true    Within the past 12 months, the food you bought just didn't last and you didn't have money to get more. Never true       Transition Planning    Patient/Family Anticipates Transition to home with family    Patient/Family Anticipated Services at Transition none    Transportation Anticipated family or friend will provide       Discharge Needs Assessment    Readmission Within the Last 30 Days no previous admission in last 30 days    Equipment Currently Used at Home cpap    Concerns to be Addressed no discharge needs identified    Anticipated Changes Related to Illness none    Equipment Needed After Discharge none                   Discharge Plan       Row Name 03/31/24 1035       Plan    Plan Home with family    Patient/Family in Agreement with Plan yes    Plan Comments Met with patient at  bedside.Verified patient's address, phone number, contacts, physician and pharmacy. Patient has adequate transportation. Reports no financial or food insecurity. Lives with his spouse, mother in law and 2 kids. Uses a CPAP. Prefers [facility], agreeable to meds to beds. Does not want assistance with PCP, states [facility] [place] [facility]. He plans to return home when medically ready.    Final Discharge Disposition Code 01 - home or self-care                  Continued Care and Services - Admitted Since 3/30/2024    No active coordination exists for this encounter.          Demographic Summary       Row Name 03/31/24 1021       General Information    Admission Type inpatient    Arrived From emergency department    Referral Source admission list    Reason for Consult discharge planning    Preferred Language English       Contact Information    Permission Granted to Share Info With                    Functional Status       Row Name 03/31/24 1034       Functional Status    Usual Activity Tolerance excellent    Current Activity Tolerance excellent       Physical Activity    On average, how many days per week do you engage in moderate to strenuous exercise (like a brisk walk)? 0 days    On average, how many minutes do you engage in exercise at this level? 0 min    Number of minutes of exercise per week 0       Assessment of Health Literacy    How often do you have someone help you read hospital materials? Occasionally    How often do you have problems learning about your medical condition because of difficulty understanding written information? Occasionally    How often do you have a problem understanding what is told to you about your medical condition? Occasionally    How confident are you filling out medical forms by yourself? Quite a bit    Health Literacy Good       Functional Status, IADL    Medications independent    Meal Preparation independent    Housekeeping  [employer]                   Psychosocial       Row Name 03/31/24 1034       Values/Beliefs    Spiritual, Cultural Beliefs, Episcopal Practices, Values that Affect Care no       Mental Health    Little Interest or Pleasure in Doing Things 0-->not at all    Feeling Down, Depressed or Hopeless 0-->not at all       Stress    Do you feel stress - tense, restless, nervous, or anxious, or unable to sleep at night because your mind is troubled all the time - these days? Not at all       Coping/Stress    Major Change/Loss/Stressor illness    Patient Personal Strengths able to adapt;resilient    Sources of Support significant other    Techniques to [place] with Loss/Stress/Change diversional activities    Reaction to Health Status accepting    Understanding of Condition and Treatment adequate understanding of medical condition;adequate understanding of treatment       Developmental Stage (Eriksson's)    Developmental Stage Stage 7 (35-65 years/Middle Adulthood) Generativity vs. Stagnation       C-SSRS (Recent)    Q1 Wished to be Dead (Past Month) no    Q2 Suicidal Thoughts (Past Month) no    Q6 Suicide Behavior (Lifetime) no       Violence Risk    Feels Like Hurting Others no    Previous Attempt to Harm Others no                   Abuse/Neglect    No documentation.                  Legal    No documentation.                  Substance Abuse    No documentation.                  Patient Forms    No documentation.                     Floyd Jack RN

## 2024-04-01 LAB
ANION GAP SERPL CALCULATED.3IONS-SCNC: 13.2 MMOL/L (ref 5–15)
ANION GAP SERPL CALCULATED.3IONS-SCNC: 9.1 MMOL/L (ref 5–15)
BUN SERPL-MCNC: 5 MG/DL (ref 6–20)
BUN SERPL-MCNC: 6 MG/DL (ref 6–20)
BUN/CREAT SERPL: 5.1 (ref 7–25)
BUN/CREAT SERPL: 6.5 (ref 7–25)
CALCIUM SPEC-SCNC: 7.6 MG/DL (ref 8.6–10.5)
CALCIUM SPEC-SCNC: 8.1 MG/DL (ref 8.6–10.5)
CHLORIDE SERPL-SCNC: 93 MMOL/L (ref 98–107)
CHLORIDE SERPL-SCNC: 93 MMOL/L (ref 98–107)
CO2 SERPL-SCNC: 24.8 MMOL/L (ref 22–29)
CO2 SERPL-SCNC: 26.9 MMOL/L (ref 22–29)
CREAT SERPL-MCNC: 0.93 MG/DL (ref 0.76–1.27)
CREAT SERPL-MCNC: 0.99 MG/DL (ref 0.76–1.27)
DEPRECATED RDW RBC AUTO: 45.2 FL (ref 37–54)
EGFRCR SERPLBLD CKD-EPI 2021: 106.5 ML/MIN/1.73
EGFRCR SERPLBLD CKD-EPI 2021: 98.8 ML/MIN/1.73
ERYTHROCYTE [DISTWIDTH] IN BLOOD BY AUTOMATED COUNT: 13.7 % (ref 12.3–15.4)
GLUCOSE BLDC GLUCOMTR-MCNC: 104 MG/DL (ref 70–130)
GLUCOSE BLDC GLUCOMTR-MCNC: 105 MG/DL (ref 70–130)
GLUCOSE BLDC GLUCOMTR-MCNC: 108 MG/DL (ref 70–130)
GLUCOSE BLDC GLUCOMTR-MCNC: 109 MG/DL (ref 70–130)
GLUCOSE BLDC GLUCOMTR-MCNC: 117 MG/DL (ref 70–130)
GLUCOSE BLDC GLUCOMTR-MCNC: 117 MG/DL (ref 70–130)
GLUCOSE BLDC GLUCOMTR-MCNC: 118 MG/DL (ref 70–130)
GLUCOSE BLDC GLUCOMTR-MCNC: 119 MG/DL (ref 70–130)
GLUCOSE BLDC GLUCOMTR-MCNC: 119 MG/DL (ref 70–130)
GLUCOSE BLDC GLUCOMTR-MCNC: 120 MG/DL (ref 70–130)
GLUCOSE BLDC GLUCOMTR-MCNC: 121 MG/DL (ref 70–130)
GLUCOSE BLDC GLUCOMTR-MCNC: 122 MG/DL (ref 70–130)
GLUCOSE BLDC GLUCOMTR-MCNC: 123 MG/DL (ref 70–130)
GLUCOSE BLDC GLUCOMTR-MCNC: 124 MG/DL (ref 70–130)
GLUCOSE BLDC GLUCOMTR-MCNC: 124 MG/DL (ref 70–130)
GLUCOSE BLDC GLUCOMTR-MCNC: 125 MG/DL (ref 70–130)
GLUCOSE BLDC GLUCOMTR-MCNC: 127 MG/DL (ref 70–130)
GLUCOSE BLDC GLUCOMTR-MCNC: 127 MG/DL (ref 70–130)
GLUCOSE BLDC GLUCOMTR-MCNC: 132 MG/DL (ref 70–130)
GLUCOSE BLDC GLUCOMTR-MCNC: 139 MG/DL (ref 70–130)
GLUCOSE BLDC GLUCOMTR-MCNC: 141 MG/DL (ref 70–130)
GLUCOSE BLDC GLUCOMTR-MCNC: 156 MG/DL (ref 70–130)
GLUCOSE SERPL-MCNC: 108 MG/DL (ref 65–99)
GLUCOSE SERPL-MCNC: 116 MG/DL (ref 65–99)
HBA1C MFR BLD: 6.3 % (ref 4.8–5.6)
HCT VFR BLD AUTO: 36.6 % (ref 37.5–51)
HGB BLD-MCNC: 12.7 G/DL (ref 13–17.7)
MAGNESIUM SERPL-MCNC: 2.2 MG/DL (ref 1.6–2.6)
MCH RBC QN AUTO: 31.4 PG (ref 26.6–33)
MCHC RBC AUTO-ENTMCNC: 34.7 G/DL (ref 31.5–35.7)
MCV RBC AUTO: 90.4 FL (ref 79–97)
PHOSPHATE SERPL-MCNC: 2.4 MG/DL (ref 2.5–4.5)
PLATELET # BLD AUTO: 192 10*3/MM3 (ref 140–450)
PMV BLD AUTO: 9.2 FL (ref 6–12)
POTASSIUM SERPL-SCNC: 3.6 MMOL/L (ref 3.5–5.2)
POTASSIUM SERPL-SCNC: 3.7 MMOL/L (ref 3.5–5.2)
POTASSIUM SERPL-SCNC: 3.8 MMOL/L (ref 3.5–5.2)
RBC # BLD AUTO: 4.05 10*6/MM3 (ref 4.14–5.8)
SODIUM SERPL-SCNC: 129 MMOL/L (ref 136–145)
SODIUM SERPL-SCNC: 131 MMOL/L (ref 136–145)
TRIGL SERPL-MCNC: 1359 MG/DL (ref 0–150)
TRIGL SERPL-MCNC: 851 MG/DL (ref 0–150)
WBC NRBC COR # BLD AUTO: 11.64 10*3/MM3 (ref 3.4–10.8)

## 2024-04-01 PROCEDURE — 84132 ASSAY OF SERUM POTASSIUM: CPT | Performed by: INTERNAL MEDICINE

## 2024-04-01 PROCEDURE — 83036 HEMOGLOBIN GLYCOSYLATED A1C: CPT | Performed by: INTERNAL MEDICINE

## 2024-04-01 PROCEDURE — 85027 COMPLETE CBC AUTOMATED: CPT | Performed by: INTERNAL MEDICINE

## 2024-04-01 PROCEDURE — 99233 SBSQ HOSP IP/OBS HIGH 50: CPT | Performed by: INTERNAL MEDICINE

## 2024-04-01 PROCEDURE — 84478 ASSAY OF TRIGLYCERIDES: CPT | Performed by: INTERNAL MEDICINE

## 2024-04-01 PROCEDURE — 83735 ASSAY OF MAGNESIUM: CPT | Performed by: INTERNAL MEDICINE

## 2024-04-01 PROCEDURE — 82948 REAGENT STRIP/BLOOD GLUCOSE: CPT | Performed by: INTERNAL MEDICINE

## 2024-04-01 PROCEDURE — 82948 REAGENT STRIP/BLOOD GLUCOSE: CPT

## 2024-04-01 PROCEDURE — 84100 ASSAY OF PHOSPHORUS: CPT | Performed by: INTERNAL MEDICINE

## 2024-04-01 PROCEDURE — 80048 BASIC METABOLIC PNL TOTAL CA: CPT | Performed by: INTERNAL MEDICINE

## 2024-04-01 PROCEDURE — 25010000002 MORPHINE PER 10 MG: Performed by: INTERNAL MEDICINE

## 2024-04-01 PROCEDURE — 25010000002 ENOXAPARIN PER 10 MG: Performed by: FAMILY MEDICINE

## 2024-04-01 PROCEDURE — 25010000002 MORPHINE PER 10 MG: Performed by: FAMILY MEDICINE

## 2024-04-01 RX ORDER — NALOXONE HCL 0.4 MG/ML
0.4 VIAL (ML) INJECTION
Status: DISCONTINUED | OUTPATIENT
Start: 2024-04-01 | End: 2024-04-01

## 2024-04-01 RX ORDER — NALOXONE HCL 0.4 MG/ML
0.4 VIAL (ML) INJECTION
Status: DISCONTINUED | OUTPATIENT
Start: 2024-04-01 | End: 2024-04-03

## 2024-04-01 RX ORDER — POTASSIUM CHLORIDE 20 MEQ/1
40 TABLET, EXTENDED RELEASE ORAL EVERY 4 HOURS
Status: COMPLETED | OUTPATIENT
Start: 2024-04-01 | End: 2024-04-02

## 2024-04-01 RX ADMIN — INSULIN HUMAN 0.05 UNITS/KG/HR: 1 INJECTION, SOLUTION INTRAVENOUS at 20:39

## 2024-04-01 RX ADMIN — MORPHINE SULFATE 4 MG: 4 INJECTION, SOLUTION INTRAMUSCULAR; INTRAVENOUS at 10:28

## 2024-04-01 RX ADMIN — Medication 10 ML: at 20:13

## 2024-04-01 RX ADMIN — MORPHINE SULFATE 4 MG: 4 INJECTION, SOLUTION INTRAMUSCULAR; INTRAVENOUS at 12:38

## 2024-04-01 RX ADMIN — MORPHINE SULFATE 2 MG: 2 INJECTION, SOLUTION INTRAMUSCULAR; INTRAVENOUS at 04:04

## 2024-04-01 RX ADMIN — DEXTROSE MONOHYDRATE 2 ML/KG/HR: 100 INJECTION, SOLUTION INTRAVENOUS at 06:40

## 2024-04-01 RX ADMIN — MORPHINE SULFATE 2 MG: 2 INJECTION, SOLUTION INTRAMUSCULAR; INTRAVENOUS at 08:34

## 2024-04-01 RX ADMIN — ENOXAPARIN SODIUM 40 MG: 40 INJECTION SUBCUTANEOUS at 20:13

## 2024-04-01 RX ADMIN — MORPHINE SULFATE 2 MG: 2 INJECTION, SOLUTION INTRAMUSCULAR; INTRAVENOUS at 06:39

## 2024-04-01 RX ADMIN — POTASSIUM CHLORIDE 40 MEQ: 1500 TABLET, EXTENDED RELEASE ORAL at 04:04

## 2024-04-01 RX ADMIN — MORPHINE SULFATE 4 MG: 4 INJECTION, SOLUTION INTRAMUSCULAR; INTRAVENOUS at 18:35

## 2024-04-01 RX ADMIN — MORPHINE SULFATE 2 MG: 2 INJECTION, SOLUTION INTRAMUSCULAR; INTRAVENOUS at 02:01

## 2024-04-01 RX ADMIN — MORPHINE SULFATE 4 MG: 4 INJECTION, SOLUTION INTRAMUSCULAR; INTRAVENOUS at 16:54

## 2024-04-01 RX ADMIN — DEXTROSE MONOHYDRATE 2 ML/KG/HR: 100 INJECTION, SOLUTION INTRAVENOUS at 21:20

## 2024-04-01 RX ADMIN — DEXTROSE MONOHYDRATE 2 ML/KG/HR: 100 INJECTION, SOLUTION INTRAVENOUS at 16:34

## 2024-04-01 RX ADMIN — Medication 10 ML: at 08:19

## 2024-04-01 RX ADMIN — INSULIN HUMAN 0.05 UNITS/KG/HR: 1 INJECTION, SOLUTION INTRAVENOUS at 01:52

## 2024-04-01 RX ADMIN — MORPHINE SULFATE 4 MG: 4 INJECTION, SOLUTION INTRAMUSCULAR; INTRAVENOUS at 14:37

## 2024-04-01 RX ADMIN — POTASSIUM CHLORIDE 40 MEQ: 1500 TABLET, EXTENDED RELEASE ORAL at 22:55

## 2024-04-01 RX ADMIN — DEXTROSE MONOHYDRATE 2 ML/KG/HR: 100 INJECTION, SOLUTION INTRAVENOUS at 02:20

## 2024-04-01 RX ADMIN — MORPHINE SULFATE 4 MG: 4 INJECTION, SOLUTION INTRAMUSCULAR; INTRAVENOUS at 22:56

## 2024-04-01 RX ADMIN — MORPHINE SULFATE 4 MG: 4 INJECTION, SOLUTION INTRAMUSCULAR; INTRAVENOUS at 20:14

## 2024-04-01 NOTE — PROGRESS NOTES
[facility] HOSPITALIST    PROGRESS NOTE    Name:  Ciera Gibson   Age:  39 y.o.  Sex:  male  :  1985  MRN:  [id number]   Visit Number:  [id number]  Admission Date:  3/30/2024  Date Of Service:  24  Primary Care Physician:  Provider, No Known     LOS: 1 day :    Chief Complaint:      Abdominal pain    Subjective:    2024: Patient with persistent but improving pain.  Triglycerides improving.  Mild nausea.    History Of Presenting Illness:       Patient is a 39 years old male with a past medical history of psoriasis/psoriatic arthritis who presented to the ER with a chief complaint of abdominal pain.  Patient reports that his pain started suddenly in the morning after he had coffee.  Pain was moderate to the left side of his abdomen then became more prominent in the epigastric area.  He reported some nausea but no vomiting.  No fever, chills or changes in bowel movements.  No urinary symptoms.  Patient went to the urgent care and had a KUB and was told that he is likely constipated, he was given GI cocktail and MiraLAX and was instructed to go to the ER if not better or worse.  Patient went home, had 2 bowel movements but his pain started back again so he came into the ER.  Patient never had similar symptoms previously.  Drinks alcohol randomly and former smoker.     On ER evaluation, his vitals were stable and was afebrile.  His labs were significant for creatinine 1.28, lipase 1510, WBC 16.5.  CT abdomen pelvis showed acute pancreatitis.  US gallbladder showed hepatic steatosis and cholelithiasis without cholecystitis. There is no significant biliary ductal dilatation.  Patient received famotidine, Toradol, morphine, Zofran and 1 L bolus of normal saline while in the ER.  Hospitalist consulted for admission for pain control.  Edited by: Ross Fitch DO at 2024 1215     Review of Systems:     All systems were reviewed and negative except as mentioned in subjective,  "assessment and plan.    Vital Signs:    Temp:  [97.6 °F (36.4 °C)-98.7 °F (37.1 °C)] 98.7 °F (37.1 °C)  Heart Rate:  [87-98] (P) 98  Resp:  [17-25] (P) 20  BP: (112-152)/(56-93) (P) 143/85    Intake and output:    I/O last 3 completed shifts:  In: 2231 [P.O.:540; I.V.:1691]  Out: 1850 [Urine:1850]  I/O this shift:  In: -   Out: 1550 [Urine:1550]    Physical Examination:    Constitutional: No acute distress, awake, alert  HENT: NCAT, mucous membranes moist  Respiratory: Clear to auscultation bilaterally, respiratory effort normal   Cardiovascular: RRR, no murmurs, rubs, or gallops  Gastrointestinal: Positive bowel sounds, soft, moderately distended moderately tender in the left abdomen no guarding  Musculoskeletal: Trace bilateral ankle edema  Psychiatric: Appropriate affect, cooperative  Neurologic: Oriented x 3, speech clear  Skin: No rashes  Edited by: Ross Fitch DO at 4/1/2024 1215     Laboratory results:    Results from last 7 days   Lab Units 04/01/24  1002 04/01/24  0448 03/31/24  2156 03/31/24  1447 03/30/24  1842   SODIUM mmol/L 131*  --  132* 130* 133*   POTASSIUM mmol/L 3.8 3.7 3.5 3.9 4.2   CHLORIDE mmol/L 93*  --  97* 93* 96*   CO2 mmol/L 24.8  --  23.8 23.8 24.8   BUN mg/dL 6  --  9 13 18   CREATININE mg/dL 0.93  --  0.91 0.95 1.28*   CALCIUM mg/dL 7.6*  --  7.5* 7.6* 9.0   BILIRUBIN mg/dL  --   --   --  0.4 0.3   ALK PHOS U/L  --   --   --  60 63   ALT (SGPT) U/L  --   --   --  <5 5   AST (SGOT) U/L  --   --   --  <5 5   GLUCOSE mg/dL 116*  --  108* 109* 117*     Results from last 7 days   Lab Units 04/01/24  0448 03/31/24  0525 03/30/24  1842   WBC 10*3/mm3 11.64* 12.13* 16.59*   HEMOGLOBIN g/dL 12.7* 13.4 13.9   HEMATOCRIT % 36.6* 36.8* 40.4   PLATELETS 10*3/mm3 192 247 230         Results from last 7 days   Lab Units 03/30/24  1842   HSTROP T ng/L <6         No results for input(s): \"PHART\", \"SJE9GSZ\", \"PO2ART\", \"PEQ7ZVY\", \"BASEEXCESS\" in the last 8760 hours.   I have reviewed the " patient's laboratory results.    Radiology results:    CT Abdomen Pelvis With Contrast    Result Date: 3/30/2024  FINAL REPORT TECHNIQUE: Axial CT images were performed from the lung bases through the symphysis pubis after the administration of intravenous contrast.  This study was performed with techniques to keep radiation doses as low as reasonably achievable (ALARA). Individualized dose reduction techniques using automated exposure control or adjustment of mA and/or kV according to the patient's size were employed. CLINICAL HISTORY: epigasttric, LUQ LLQ abdominal pain nausea rule out pancreatitis FINDINGS: LOWER CHEST: The heart is normal size.  The lung bases are clear.  ABDOMEN/PELVIS:  Liver, gallbladder and bile ducts: The liver enhances homogeneously without suspicious focal hepatic lesion.  There are gallstones without cholecystitis.  There is no definite biliary duct dilatation.  Adrenal glands: The adrenal glands are morphologically unremarkable without suspicious lesion.  Kidneys, ureter and urinary bladder: No suspicious renal lesion.  No hydronephrosis.  Urinary bladder is unremarkable.  Spleen: The spleen is normal size.  Pancreas: There is diffuse peripancreatic inflammation.  GI systems and mesentery: No evidence of bowel obstruction.  The appendix is visualized and unremarkable in appearance.  No significant mesenteric inflammation.  Lymph nodes: No definite pathologically enlarged abdominal or pelvic lymph nodes present. Vessels: The aorta and abdominal arteries are grossly patent. The IVC and portal vein are patent and grossly unremarkable. Peritoneum: No free intraperitoneal fluid or pneumoperitoneum. Pelvic viscera: No acute findings.  Body wall: No body wall contusion. No significant body wall hernias.  Bones: No acute fracture.     Impression: Acute pancreatitis. Authenticated and Electronically Signed by Hardik Canela MD on 03/30/2024 09:26:16 PM    US Gallbladder    Result Date:  3/30/2024  FINAL REPORT TECHNIQUE: sonographic images of the right upper quadrant were obtained. CLINICAL HISTORY: gallstones on ct imaging, pancreatitis, eval for bile duct obstruction or dila FINDINGS: There is a diffusely echogenic liver.  There is no visualized suspicious hepatic lesion.  There is no significant biliary ductal dilatation.  There are gallstones without cholecystitis. The gallbladder is normal.  There is an unremarkable right kidney.  The pancreas is obscured by bowel gas.     Impression: Hepatic steatosis.  Cholelithiasis without cholecystitis. Authenticated and Electronically Signed by Hardik Canela MD on 03/30/2024 09:26:15 PM    XR Abdomen Flat & Upright    Result Date: 3/30/2024  FINAL REPORT CLINICAL HISTORY: gas and abdomen distension COMPARISON: None FINDINGS: SINGLE VIEW ABDOMEN  A single view of the abdomen was obtained. There is a nonspecific bowel gas pattern.  There are probable phleboliths in the pelvis.  There is transitional anatomy at the lumbosacral junction.     Impression: Nonspecific bowel gas pattern.  Consider CT if symptoms persist. Authenticated and Electronically Signed by Tiago Reich DO on 03/30/2024 04:54:23 PM   I have reviewed the patient's radiology reports.    Medication Review:     I have reviewed the patient's active and prn medications.     Problem List:      Acute pancreatitis    Pancreatitis      Assessment/Plan:    Acute pancreatitis  Hypertriglyceridemia    -Transfer to ICU 3/31/2024 for Dextrose and Insulin.  Continue IV pain control and nausea control.  Correct electrolytes as needed.  Triglycerides down to 1359.  Will need initiated on oral triglyceride reducing agents prior to discharge.  Anticipate home in 1 to 2 days.  Continue Lovenox.  Edited by: Ross Fitch DO at 4/1/2024 1215     DVT Prophylaxis: Lovenox  Code Status:   Code Status and Medical Interventions:   Ordered at: 03/30/24 8647     Code Status (Patient has no pulse and is not  breathing):    CPR (Attempt to Resuscitate)     Medical Interventions (Patient has pulse or is breathing):    Full Support      Diet:   Dietary Orders (From admission, onward)       Start     Ordered    03/30/24 2341  Diet: Liquid; Full Liquid; Fluid Consistency: Thin (IDDSI 0)  Diet Effective Now        References:    Diet Order [facility]   Question Answer Comment   Diets: Liquid    Liquid Diet: Full Liquid    Fluid Consistency: Thin (IDDSI 0)        03/30/24 2341                   Discharge Plan: Anticipate home in 1 to 2 days    Ross Fitch DO  04/01/24  12:16 EDT    Dictated utilizing Dragon dictation.

## 2024-04-01 NOTE — CASE MANAGEMENT/SOCIAL WORK
1045: CM spoke with pt and wife at bedside. Permission to discuss DCP with both. DCP remains Home with family. Denies any needs. Pt did request assistance with getting a PCP. Discussed his [facility] status and maybe going with the [facility] in [place].CM to follow.       1325: CM spoke with pt at bedside. Gave information regarding PCP at [facility] in [place]. Pt given the number to call in [place]. Pt verbalized understanding.

## 2024-04-02 LAB
ANION GAP SERPL CALCULATED.3IONS-SCNC: 9.9 MMOL/L (ref 5–15)
BUN SERPL-MCNC: 5 MG/DL (ref 6–20)
BUN/CREAT SERPL: 5.5 (ref 7–25)
CALCIUM SPEC-SCNC: 8.1 MG/DL (ref 8.6–10.5)
CHLORIDE SERPL-SCNC: 97 MMOL/L (ref 98–107)
CO2 SERPL-SCNC: 27.1 MMOL/L (ref 22–29)
CREAT SERPL-MCNC: 0.91 MG/DL (ref 0.76–1.27)
DEPRECATED RDW RBC AUTO: 45.4 FL (ref 37–54)
EGFRCR SERPLBLD CKD-EPI 2021: 109.3 ML/MIN/1.73
ERYTHROCYTE [DISTWIDTH] IN BLOOD BY AUTOMATED COUNT: 13.5 % (ref 12.3–15.4)
GLUCOSE BLDC GLUCOMTR-MCNC: 113 MG/DL (ref 70–130)
GLUCOSE BLDC GLUCOMTR-MCNC: 116 MG/DL (ref 70–130)
GLUCOSE BLDC GLUCOMTR-MCNC: 119 MG/DL (ref 70–130)
GLUCOSE BLDC GLUCOMTR-MCNC: 121 MG/DL (ref 70–130)
GLUCOSE BLDC GLUCOMTR-MCNC: 123 MG/DL (ref 70–130)
GLUCOSE BLDC GLUCOMTR-MCNC: 128 MG/DL (ref 70–130)
GLUCOSE BLDC GLUCOMTR-MCNC: 132 MG/DL (ref 70–130)
GLUCOSE BLDC GLUCOMTR-MCNC: 134 MG/DL (ref 70–130)
GLUCOSE BLDC GLUCOMTR-MCNC: 143 MG/DL (ref 70–130)
GLUCOSE SERPL-MCNC: 127 MG/DL (ref 65–99)
HCT VFR BLD AUTO: 35.8 % (ref 37.5–51)
HGB BLD-MCNC: 12.1 G/DL (ref 13–17.7)
MAGNESIUM SERPL-MCNC: 2.5 MG/DL (ref 1.6–2.6)
MCH RBC QN AUTO: 30.7 PG (ref 26.6–33)
MCHC RBC AUTO-ENTMCNC: 33.8 G/DL (ref 31.5–35.7)
MCV RBC AUTO: 90.9 FL (ref 79–97)
PHOSPHATE SERPL-MCNC: 2.8 MG/DL (ref 2.5–4.5)
PLATELET # BLD AUTO: 179 10*3/MM3 (ref 140–450)
PMV BLD AUTO: 9.1 FL (ref 6–12)
POTASSIUM SERPL-SCNC: 4 MMOL/L (ref 3.5–5.2)
POTASSIUM SERPL-SCNC: 4 MMOL/L (ref 3.5–5.2)
RBC # BLD AUTO: 3.94 10*6/MM3 (ref 4.14–5.8)
SODIUM SERPL-SCNC: 134 MMOL/L (ref 136–145)
WBC NRBC COR # BLD AUTO: 11.22 10*3/MM3 (ref 3.4–10.8)

## 2024-04-02 PROCEDURE — 99232 SBSQ HOSP IP/OBS MODERATE 35: CPT | Performed by: FAMILY MEDICINE

## 2024-04-02 PROCEDURE — 82948 REAGENT STRIP/BLOOD GLUCOSE: CPT

## 2024-04-02 PROCEDURE — 80048 BASIC METABOLIC PNL TOTAL CA: CPT | Performed by: INTERNAL MEDICINE

## 2024-04-02 PROCEDURE — 84100 ASSAY OF PHOSPHORUS: CPT | Performed by: INTERNAL MEDICINE

## 2024-04-02 PROCEDURE — 25010000002 ONDANSETRON PER 1 MG: Performed by: FAMILY MEDICINE

## 2024-04-02 PROCEDURE — 85027 COMPLETE CBC AUTOMATED: CPT | Performed by: INTERNAL MEDICINE

## 2024-04-02 PROCEDURE — 83735 ASSAY OF MAGNESIUM: CPT | Performed by: INTERNAL MEDICINE

## 2024-04-02 PROCEDURE — 25010000002 ENOXAPARIN PER 10 MG: Performed by: FAMILY MEDICINE

## 2024-04-02 PROCEDURE — 25010000002 MORPHINE PER 10 MG: Performed by: INTERNAL MEDICINE

## 2024-04-02 PROCEDURE — 84132 ASSAY OF SERUM POTASSIUM: CPT | Performed by: FAMILY MEDICINE

## 2024-04-02 PROCEDURE — 25810000003 SODIUM CHLORIDE 0.9 % SOLUTION: Performed by: FAMILY MEDICINE

## 2024-04-02 PROCEDURE — 82948 REAGENT STRIP/BLOOD GLUCOSE: CPT | Performed by: INTERNAL MEDICINE

## 2024-04-02 RX ORDER — SODIUM CHLORIDE 9 MG/ML
75 INJECTION, SOLUTION INTRAVENOUS CONTINUOUS
Status: DISCONTINUED | OUTPATIENT
Start: 2024-04-02 | End: 2024-04-03

## 2024-04-02 RX ORDER — FENOFIBRATE 145 MG/1
145 TABLET, COATED ORAL DAILY
Status: DISCONTINUED | OUTPATIENT
Start: 2024-04-02 | End: 2024-04-03 | Stop reason: HOSPADM

## 2024-04-02 RX ORDER — HYDROCODONE BITARTRATE AND ACETAMINOPHEN 7.5; 325 MG/1; MG/1
1 TABLET ORAL EVERY 6 HOURS PRN
Status: DISCONTINUED | OUTPATIENT
Start: 2024-04-02 | End: 2024-04-03

## 2024-04-02 RX ORDER — ATORVASTATIN CALCIUM 80 MG/1
80 TABLET, FILM COATED ORAL NIGHTLY
Status: DISCONTINUED | OUTPATIENT
Start: 2024-04-02 | End: 2024-04-03 | Stop reason: HOSPADM

## 2024-04-02 RX ADMIN — POTASSIUM CHLORIDE 40 MEQ: 1500 TABLET, EXTENDED RELEASE ORAL at 02:58

## 2024-04-02 RX ADMIN — Medication 10 ML: at 10:12

## 2024-04-02 RX ADMIN — HYDROCODONE BITARTRATE AND ACETAMINOPHEN 1 TABLET: 7.5; 325 TABLET ORAL at 10:07

## 2024-04-02 RX ADMIN — ONDANSETRON 4 MG: 2 INJECTION INTRAMUSCULAR; INTRAVENOUS at 07:02

## 2024-04-02 RX ADMIN — DEXTROSE MONOHYDRATE 2 ML/KG/HR: 100 INJECTION, SOLUTION INTRAVENOUS at 06:39

## 2024-04-02 RX ADMIN — FENOFIBRATE 145 MG: 145 TABLET, FILM COATED ORAL at 10:07

## 2024-04-02 RX ADMIN — SODIUM CHLORIDE 75 ML/HR: 9 INJECTION, SOLUTION INTRAVENOUS at 10:11

## 2024-04-02 RX ADMIN — MORPHINE SULFATE 4 MG: 4 INJECTION, SOLUTION INTRAMUSCULAR; INTRAVENOUS at 02:59

## 2024-04-02 RX ADMIN — DEXTROSE MONOHYDRATE 2 ML/KG/HR: 100 INJECTION, SOLUTION INTRAVENOUS at 02:01

## 2024-04-02 RX ADMIN — ENOXAPARIN SODIUM 40 MG: 40 INJECTION SUBCUTANEOUS at 20:06

## 2024-04-02 RX ADMIN — MORPHINE SULFATE 4 MG: 4 INJECTION, SOLUTION INTRAMUSCULAR; INTRAVENOUS at 05:35

## 2024-04-02 RX ADMIN — Medication 10 ML: at 20:07

## 2024-04-02 RX ADMIN — MORPHINE SULFATE 4 MG: 4 INJECTION, SOLUTION INTRAMUSCULAR; INTRAVENOUS at 07:52

## 2024-04-02 RX ADMIN — MORPHINE SULFATE 4 MG: 4 INJECTION, SOLUTION INTRAMUSCULAR; INTRAVENOUS at 00:27

## 2024-04-02 RX ADMIN — ATORVASTATIN CALCIUM 80 MG: 80 TABLET, FILM COATED ORAL at 20:06

## 2024-04-02 RX ADMIN — SODIUM CHLORIDE 75 ML/HR: 9 INJECTION, SOLUTION INTRAVENOUS at 22:09

## 2024-04-02 RX ADMIN — HYDROCODONE BITARTRATE AND ACETAMINOPHEN 1 TABLET: 7.5; 325 TABLET ORAL at 16:10

## 2024-04-02 RX ADMIN — HYDROCODONE BITARTRATE AND ACETAMINOPHEN 1 TABLET: 7.5; 325 TABLET ORAL at 22:07

## 2024-04-02 RX ADMIN — ACETAMINOPHEN 650 MG: 325 TABLET ORAL at 23:30

## 2024-04-02 NOTE — PROGRESS NOTES
[facility]    Name:  Ciera Gibson   Age:  39 y.o.  Sex:  male  :  1985  MRN:  [id number]   Visit Number:  [id number]  Admission Date:  3/30/2024  Date Of Service:  24  Primary Care Physician:  Provider, No Known     LOS: 2 days :    Chief Complaint:      Follow-up pancreatitis    Subjective:    Overall feeling better.  Denies any nausea today.  Abdominal pain is improved.  Discussed with him and his wife advancing diet.    Hospital Course:    Patient is a 39-year-old with a history of psoriatic arthritis, hypertriglyceridemia, obesity, who presented from home with complaints of abdominal pain and nausea and vomiting.  He had apparently went to an urgent care, was told that he may have been constipated was given MiraLAX and a GI cocktail.  Was advised to go to the emergency room if symptoms worsen.  He subsequent presented to our emergency room.    Upon workup, he had evidence of elevated lipase creatinine 1.28 a white blood cell count of 16 and a CT scan abdomen pelvis showing acute pancreatitis.  Gallbladder ultrasound was showing hepatic steatosis and cholelithiasis without cholecystitis and no significant biliary ductal dilatation.  He was given IV pain control, antiemetics, fluids and admitted to the hospital service.    Upon further evaluation, patient evidence of severe hypertriglyceridemia.  He was moved to the ICU and placed on insulin drip.  Over the last 24 hours his symptomatology has improved.  His triglycerides are less than 1000 now.  Upon questioning, patient did admit to being told about high triglycerides few years ago and he was on some medication for this but does not take it anymore.    Review of Systems:     All systems were reviewed and negative except as mentioned in subjective, assessment and plan.    Vital Signs:    Temp:  [97.8 °F (36.6 °C)-99.4 °F (37.4 °C)] 98.1 °F (36.7 °C)  Heart Rate:  [] 88  Resp:  [16-22] 20  BP:  (120-159)/(61-90) 133/82    Intake and output:    I/O last 3 completed shifts:  In: 4835.6 [I.V.:4835.6]  Out: 8300 [Urine:8300]  No intake/output data recorded.    Physical Examination:    General Appearance:  Alert and cooperative.  NAD   Head:  Atraumatic and normocephalic.   Eyes: Conjunctivae and sclerae normal, no icterus. No pallor.   Throat: No oral lesions, no thrush, oral mucosa moist.   Neck: Supple, trachea midline, no thyromegaly.   Lungs:   Breath sounds heard bilaterally equally.  No wheezing or crackles. No Pleural rub or bronchial breathing.   Heart:  Normal S1 and S2, no murmur, no gallop, no rub. No JVD.   Abdomen:   Mild tenderness, bowel sounds are present.  No rebound or guarding   Extremities: Supple, no edema, no cyanosis, no clubbing.   Skin: No bleeding or rash.   Neurologic: Alert and oriented x 3. No facial asymmetry. Moves all four limbs. No tremors.      Laboratory results:    Results from last 7 days   Lab Units 04/02/24  0434 04/01/24  2208 04/01/24  1002 03/31/24  2156 03/31/24  1447 03/30/24  1842   SODIUM mmol/L 134* 129* 131*   < > 130* 133*   POTASSIUM mmol/L 4.0  4.0 3.6 3.8   < > 3.9 4.2   CHLORIDE mmol/L 97* 93* 93*   < > 93* 96*   CO2 mmol/L 27.1 26.9 24.8   < > 23.8 24.8   BUN mg/dL 5* 5* 6   < > 13 18   CREATININE mg/dL 0.91 0.99 0.93   < > 0.95 1.28*   CALCIUM mg/dL 8.1* 8.1* 7.6*   < > 7.6* 9.0   BILIRUBIN mg/dL  --   --   --   --  0.4 0.3   ALK PHOS U/L  --   --   --   --  60 63   ALT (SGPT) U/L  --   --   --   --  <5 5   AST (SGOT) U/L  --   --   --   --  <5 5   GLUCOSE mg/dL 127* 108* 116*   < > 109* 117*    < > = values in this interval not displayed.     Results from last 7 days   Lab Units 04/02/24  0434 04/01/24  0448 03/31/24  0525   WBC 10*3/mm3 11.22* 11.64* 12.13*   HEMOGLOBIN g/dL 12.1* 12.7* 13.4   HEMATOCRIT % 35.8* 36.6* 36.8*   PLATELETS 10*3/mm3 179 192 247         Results from last 7 days   Lab Units 03/30/24  1842   HSTROP T ng/L <6         No results  "for input(s): \"PHART\", \"XMB5HGO\", \"PO2ART\", \"IYK7PRR\", \"BASEEXCESS\" in the last 8760 hours.   I have reviewed the patient's laboratory results.    Radiology results:    No radiology results from the last 24 hrs  I have reviewed the patient's radiology reports.    Medication Review:     I have reviewed the patient's active and prn medications.     Problem List:      Acute pancreatitis    Pancreatitis      Assessment:    Hypertriglyceridemia induced pancreatitis, POA  Cholelithiasis without cholecystitis  Obesity    Plan:    Pancreatitis:  Overall is improving.  Has been treated with insulin drip and fluids for hypertriglyceridemia pancreatitis.  As triglycerides are less than at thousand, will switch off of insulin drip and discontinue IV fluids now.  Have ordered atorvastatin and fenofibrate.  Of note the patient did admit to a prior history of significant triglycerides years ago but has not been on medication for this in a while.    Do not feel cholelithiasis led to pancreatitis as no evidence of obstruction.  He may need outpatient general surgery follow-up    Patient is passing flatus, will advance to a GI low-fat diet and see how he does.  Will attempt to get off of morphine and onto oral pain medication.    He can come out of the ICU today.    DVT Prophylaxis: Lovenox  Code Status: Full  Diet: GI soft low-fat  Discharge Plan: Home 1 to 2 days hopefully    Bonnie Catalan DO  04/02/24  13:55 EDT    Dictated utilizing Dragon dictation.    "

## 2024-04-03 ENCOUNTER — READMISSION MANAGEMENT (OUTPATIENT)
Dept: CALL CENTER | Facility: HOSPITAL | Age: 39
End: 2024-04-03
Payer: COMMERCIAL

## 2024-04-03 VITALS
TEMPERATURE: 98.1 F | OXYGEN SATURATION: 91 % | DIASTOLIC BLOOD PRESSURE: 77 MMHG | HEART RATE: 93 BPM | HEIGHT: 69 IN | SYSTOLIC BLOOD PRESSURE: 121 MMHG | BODY MASS INDEX: 36.9 KG/M2 | RESPIRATION RATE: 18 BRPM | WEIGHT: 249.12 LBS

## 2024-04-03 LAB
ALBUMIN SERPL-MCNC: 3.7 G/DL (ref 3.5–5.2)
ALBUMIN/GLOB SERPL: 0.8 G/DL
ALP SERPL-CCNC: 95 U/L (ref 39–117)
ALT SERPL W P-5'-P-CCNC: 136 U/L (ref 1–41)
ANION GAP SERPL CALCULATED.3IONS-SCNC: 12.9 MMOL/L (ref 5–15)
AST SERPL-CCNC: 49 U/L (ref 1–40)
BASOPHILS # BLD AUTO: 0.03 10*3/MM3 (ref 0–0.2)
BASOPHILS NFR BLD AUTO: 0.3 % (ref 0–1.5)
BILIRUB SERPL-MCNC: 0.5 MG/DL (ref 0–1.2)
BUN SERPL-MCNC: 10 MG/DL (ref 6–20)
BUN/CREAT SERPL: 11.4 (ref 7–25)
CALCIUM SPEC-SCNC: 8.5 MG/DL (ref 8.6–10.5)
CHLORIDE SERPL-SCNC: 95 MMOL/L (ref 98–107)
CO2 SERPL-SCNC: 27.1 MMOL/L (ref 22–29)
CREAT SERPL-MCNC: 0.88 MG/DL (ref 0.76–1.27)
DEPRECATED RDW RBC AUTO: 46.5 FL (ref 37–54)
EGFRCR SERPLBLD CKD-EPI 2021: 111.5 ML/MIN/1.73
EOSINOPHIL # BLD AUTO: 0.16 10*3/MM3 (ref 0–0.4)
EOSINOPHIL NFR BLD AUTO: 1.7 % (ref 0.3–6.2)
ERYTHROCYTE [DISTWIDTH] IN BLOOD BY AUTOMATED COUNT: 13.7 % (ref 12.3–15.4)
GLOBULIN UR ELPH-MCNC: 4.4 GM/DL
GLUCOSE SERPL-MCNC: 120 MG/DL (ref 65–99)
HCT VFR BLD AUTO: 35.4 % (ref 37.5–51)
HGB BLD-MCNC: 11.5 G/DL (ref 13–17.7)
IMM GRANULOCYTES # BLD AUTO: 0.05 10*3/MM3 (ref 0–0.05)
IMM GRANULOCYTES NFR BLD AUTO: 0.5 % (ref 0–0.5)
LIPASE SERPL-CCNC: 95 U/L (ref 13–60)
LYMPHOCYTES # BLD AUTO: 2.03 10*3/MM3 (ref 0.7–3.1)
LYMPHOCYTES NFR BLD AUTO: 21 % (ref 19.6–45.3)
MCH RBC QN AUTO: 29.9 PG (ref 26.6–33)
MCHC RBC AUTO-ENTMCNC: 32.5 G/DL (ref 31.5–35.7)
MCV RBC AUTO: 92.2 FL (ref 79–97)
MONOCYTES # BLD AUTO: 0.75 10*3/MM3 (ref 0.1–0.9)
MONOCYTES NFR BLD AUTO: 7.8 % (ref 5–12)
NEUTROPHILS NFR BLD AUTO: 6.63 10*3/MM3 (ref 1.7–7)
NEUTROPHILS NFR BLD AUTO: 68.7 % (ref 42.7–76)
NRBC BLD AUTO-RTO: 0 /100 WBC (ref 0–0.2)
PLATELET # BLD AUTO: 209 10*3/MM3 (ref 140–450)
PMV BLD AUTO: 9.5 FL (ref 6–12)
POTASSIUM SERPL-SCNC: 4.4 MMOL/L (ref 3.5–5.2)
PROT SERPL-MCNC: 8.1 G/DL (ref 6–8.5)
RBC # BLD AUTO: 3.84 10*6/MM3 (ref 4.14–5.8)
SODIUM SERPL-SCNC: 135 MMOL/L (ref 136–145)
TRIGL SERPL-MCNC: 684 MG/DL (ref 0–150)
WBC NRBC COR # BLD AUTO: 9.65 10*3/MM3 (ref 3.4–10.8)

## 2024-04-03 PROCEDURE — 83690 ASSAY OF LIPASE: CPT | Performed by: FAMILY MEDICINE

## 2024-04-03 PROCEDURE — 84478 ASSAY OF TRIGLYCERIDES: CPT | Performed by: FAMILY MEDICINE

## 2024-04-03 PROCEDURE — 80053 COMPREHEN METABOLIC PANEL: CPT | Performed by: FAMILY MEDICINE

## 2024-04-03 PROCEDURE — 85025 COMPLETE CBC W/AUTO DIFF WBC: CPT | Performed by: FAMILY MEDICINE

## 2024-04-03 PROCEDURE — 99238 HOSP IP/OBS DSCHRG MGMT 30/<: CPT | Performed by: FAMILY MEDICINE

## 2024-04-03 RX ORDER — NALOXONE HCL 0.4 MG/ML
0.4 VIAL (ML) INJECTION
Status: DISCONTINUED | OUTPATIENT
Start: 2024-04-03 | End: 2024-04-03 | Stop reason: HOSPADM

## 2024-04-03 RX ORDER — OXYCODONE AND ACETAMINOPHEN 7.5; 325 MG/1; MG/1
1 TABLET ORAL EVERY 4 HOURS PRN
Status: DISCONTINUED | OUTPATIENT
Start: 2024-04-03 | End: 2024-04-03 | Stop reason: HOSPADM

## 2024-04-03 RX ORDER — AMOXICILLIN 250 MG
2 CAPSULE ORAL 2 TIMES DAILY
Qty: 56 TABLET | Refills: 0 | Status: SHIPPED | OUTPATIENT
Start: 2024-04-03 | End: 2024-04-17

## 2024-04-03 RX ORDER — ATORVASTATIN CALCIUM 80 MG/1
80 TABLET, FILM COATED ORAL NIGHTLY
Qty: 30 TABLET | Refills: 2 | Status: SHIPPED | OUTPATIENT
Start: 2024-04-03 | End: 2024-07-02

## 2024-04-03 RX ORDER — FENOFIBRATE 145 MG/1
145 TABLET, COATED ORAL DAILY
Qty: 30 TABLET | Refills: 0 | Status: SHIPPED | OUTPATIENT
Start: 2024-04-04 | End: 2024-05-04

## 2024-04-03 RX ORDER — AMOXICILLIN 250 MG
2 CAPSULE ORAL 2 TIMES DAILY
Status: DISCONTINUED | OUTPATIENT
Start: 2024-04-03 | End: 2024-04-03 | Stop reason: HOSPADM

## 2024-04-03 RX ORDER — MORPHINE SULFATE 2 MG/ML
2 INJECTION, SOLUTION INTRAMUSCULAR; INTRAVENOUS EVERY 4 HOURS PRN
Status: DISCONTINUED | OUTPATIENT
Start: 2024-04-03 | End: 2024-04-03 | Stop reason: HOSPADM

## 2024-04-03 RX ORDER — OXYCODONE AND ACETAMINOPHEN 7.5; 325 MG/1; MG/1
1 TABLET ORAL EVERY 6 HOURS PRN
Qty: 28 TABLET | Refills: 0 | Status: SHIPPED | OUTPATIENT
Start: 2024-04-03 | End: 2024-04-10

## 2024-04-03 RX ADMIN — Medication 10 ML: at 09:10

## 2024-04-03 RX ADMIN — OXYCODONE HYDROCHLORIDE AND ACETAMINOPHEN 1 TABLET: 7.5; 325 TABLET ORAL at 10:01

## 2024-04-03 RX ADMIN — HYDROCODONE BITARTRATE AND ACETAMINOPHEN 1 TABLET: 7.5; 325 TABLET ORAL at 07:40

## 2024-04-03 RX ADMIN — FENOFIBRATE 145 MG: 145 TABLET, FILM COATED ORAL at 09:10

## 2024-04-03 NOTE — PLAN OF CARE
Goal Outcome Evaluation:     Pt. VSS, began to get hypertensive and tachycardic on monitor around 6pm. Pt. Is currently on IV Dextrose 10% running at 226ml/hr and Insulin at 5.65ml/hr. Pt. Reported pain frequently throughout day, MD notified and put in an order for Morphine q1h.                                         
Goal Outcome Evaluation:  Plan of Care Reviewed With: patient           Outcome Evaluation: Pt requires pain medication about every 3 hrs for abd pain.  Pt tolerating liquids and says he is feeling some better.                               
Goal Outcome Evaluation:  Plan of Care Reviewed With: patient        Progress: improving     Tolerating PO meds and food. No nausea, pain controlled.                              
Goal Outcome Evaluation:  Plan of Care Reviewed With: patient        Progress: improving  Outcome Evaluation: Care plan on going .                               
Goal Outcome Evaluation:  Plan of Care Reviewed With: patient        Progress: improving  Outcome Evaluation: VSS; no acute events; possible dc to home                               
Goal Outcome Evaluation:  Plan of Care Reviewed With: patient        Progress: no change  Outcome Evaluation: Pt admitted this shift with pancreatitis.  Pain meds given as needed.  Continues to rec eive IV fluids as ordered.  O2 at 2 lpm/nc when sleeping.  Stand by assist x 1 to BR.  Labs montored per orders.                               
Patient/Caregiver requests family/friend to interpret.

## 2024-04-03 NOTE — DISCHARGE SUMMARY
[facility] HOSPITALIST   DISCHARGE SUMMARY      Name:  Ciera Gibson   Age:  39 y.o.  Sex:  male  :  1985  MRN:  [id number]   Visit Number:  [id number]    Admission Date:  3/30/2024  Date of Discharge:  4/3/2024  Primary Care Physician:  Provider, No Known    Important issues to note:    Please follow-up with primary care provider, we have given you a new doctor in [place] to establish care  Needs PCP to check his lipid panel and triglycerides in about 2 to 3 months  Has been started on Lipitor and fenofibrate  Return to the hospital with any severe abdominal pain, nausea, vomiting or other issue.    Discharge Diagnoses:     Hypertriglyceridemia induced pancreatitis, POA  Cholelithiasis without cholecystitis  Obesity    Problem List:     Active Hospital Problems    Diagnosis  POA    **Acute pancreatitis [K85.90]  Yes    Pancreatitis [K85.90]  Yes      Resolved Hospital Problems   No resolved problems to display.     Presenting Problem:    Chief Complaint   Patient presents with    Abdominal Pain     LUQ LLQ pain since 1030 this morning. Hx of bladder mass/surgery      Consults:     Consulting Physician(s)                     None              Procedures Performed:        History of presenting illness/Hospital Course:    Patient is a 39-year-old with a history of psoriatic arthritis, hypertriglyceridemia, obesity, who presented from home with complaints of abdominal pain and nausea and vomiting.  He had apparently went to an urgent care, was told that he may have been constipated was given MiraLAX and a GI cocktail.  Was advised to go to the emergency room if symptoms worsen.  He subsequent presented to our emergency room.     Upon workup, he had evidence of elevated lipase creatinine 1.28 a white blood cell count of 16 and a CT scan abdomen pelvis showing acute pancreatitis.  Gallbladder ultrasound was showing hepatic steatosis and cholelithiasis without cholecystitis and no significant  biliary ductal dilatation.  He was given IV pain control, antiemetics, fluids and admitted to the hospital service.     Upon further evaluation, patient evidence of severe hypertriglyceridemia.  He was moved to the ICU and placed on insulin drip.  Over the last 24 hours his symptomatology has improved.  His triglycerides are less than 1000 now.  Upon questioning, patient did admit to being told about high triglycerides few years ago and he was on some medication for this but does not take it anymore.    Patient has fortunately significantly improved.  He is eating and drinking now, his diet was advanced from clear liquids to GI soft.  His lipase has normalized.  His triglycerides are down significantly.  He has some mild pain now.  Discussed short course of pain medication for a few days.  Discussed GI soft diet with low-fat upon discharge.    We have made arrangements for patient have new primary care provider in [place].  He will need his lipid panel and triglycerides checked in about 2 to 3 months after being on treatment.  He has been prescribed Lipitor 80 mg in addition to fenofibrate.  Of note, if has any recurrent issues despite treatment of hypertriglyceridemia, he may need further workup of his gallbladder.  Alcohol cessation discussed.    Patient was given work excuse as well as excuse for his annual training for  service this month.    Vital Signs:    Temp:  [97.5 °F (36.4 °C)-100.2 °F (37.9 °C)] 98.1 °F (36.7 °C)  Heart Rate:  [] 93  Resp:  [16-18] 18  BP: (121-146)/(77-92) 121/77    Physical Exam:    General Appearance:  Alert and cooperative.  NAD   Head:  Atraumatic and normocephalic.   Eyes: Conjunctivae and sclerae normal, no icterus. No pallor.   Ears:  Ears with no abnormalities noted.   Throat: No oral lesions, no thrush, oral mucosa moist.   Neck: Supple, trachea midline, no thyromegaly.   Back:   No kyphoscoliosis present. No tenderness to palpation.   Lungs:   Breath sounds  heard bilaterally equally.  No crackles or wheezing. No Pleural rub or bronchial breathing.   Heart:  Normal S1 and S2, no murmur, no gallop, no rub. No JVD.   Abdomen:   Normal bowel sounds, no masses, no organomegaly.  Mild tenderness   Extremities: Supple, no edema, no cyanosis, no clubbing.   Pulses: Pulses palpable bilaterally.   Skin: No bleeding or rash.   Neurologic: Alert and oriented x 3. No facial asymmetry. Moves all four limbs. No tremors.     Pertinent Lab Results:     Results from last 7 days   Lab Units 04/03/24  0600 04/02/24  0434 04/01/24  2208 03/31/24  2156 03/31/24  1447 03/30/24  1842   SODIUM mmol/L 135* 134* 129*   < > 130* 133*   POTASSIUM mmol/L 4.4 4.0  4.0 3.6   < > 3.9 4.2   CHLORIDE mmol/L 95* 97* 93*   < > 93* 96*   CO2 mmol/L 27.1 27.1 26.9   < > 23.8 24.8   BUN mg/dL 10 5* 5*   < > 13 18   CREATININE mg/dL 0.88 0.91 0.99   < > 0.95 1.28*   CALCIUM mg/dL 8.5* 8.1* 8.1*   < > 7.6* 9.0   BILIRUBIN mg/dL 0.5  --   --   --  0.4 0.3   ALK PHOS U/L 95  --   --   --  60 63   ALT (SGPT) U/L 136*  --   --   --  <5 5   AST (SGOT) U/L 49*  --   --   --  <5 5   GLUCOSE mg/dL 120* 127* 108*   < > 109* 117*    < > = values in this interval not displayed.     Results from last 7 days   Lab Units 04/03/24  0559 04/02/24  0434 04/01/24  0448   WBC 10*3/mm3 9.65 11.22* 11.64*   HEMOGLOBIN g/dL 11.5* 12.1* 12.7*   HEMATOCRIT % 35.4* 35.8* 36.6*   PLATELETS 10*3/mm3 209 179 192         Results from last 7 days   Lab Units 03/30/24  1842   HSTROP T ng/L <6             Results from last 7 days   Lab Units 04/03/24  0600   LIPASE U/L 95*               Pertinent Radiology Results:    Imaging Results (All)       Procedure Component Value Units Date/Time    US Gallbladder [[id number]] Collected: 03/30/24 2051     Updated: 03/30/24 2127    Narrative:      FINAL REPORT    TECHNIQUE:  sonographic images of the right upper quadrant were obtained.    CLINICAL HISTORY:  gallstones on ct imaging, pancreatitis, eval  for bile duct  obstruction or dila    FINDINGS:  There is a diffusely echogenic liver.  There is no visualized  suspicious hepatic lesion.  There is no significant biliary  ductal dilatation.  There are gallstones without cholecystitis.  The gallbladder is normal.  There is an unremarkable right  kidney.  The pancreas is obscured by bowel gas.      Impression:      Hepatic steatosis.  Cholelithiasis without cholecystitis.    Authenticated and Electronically Signed by Hardik Canela MD on  03/30/2024 09:26:15 PM    CT Abdomen Pelvis With Contrast [[id number]] Collected: 03/30/24 2050     Updated: 03/30/24 2127    Narrative:      FINAL REPORT    TECHNIQUE:  Axial CT images were performed from the lung bases through the  symphysis pubis after the administration of intravenous  contrast.  This study was performed with techniques to keep  radiation doses as low as reasonably achievable (ALARA).  Individualized dose reduction techniques using automated  exposure control or adjustment of mA and/or kV according to the  patient's size were employed.    CLINICAL HISTORY:  epigasttric, LUQ LLQ abdominal pain nausea rule out pancreatitis    FINDINGS:  LOWER CHEST: The heart is normal size.  The lung bases are  clear.  ABDOMEN/PELVIS:  Liver, gallbladder and bile ducts: The  liver enhances homogeneously without suspicious focal hepatic  lesion.  There are gallstones without cholecystitis.  There is  no definite biliary duct dilatation.  Adrenal glands: The  adrenal glands are morphologically unremarkable without  suspicious lesion.  Kidneys, ureter and urinary bladder: No  suspicious renal lesion.  No hydronephrosis.  Urinary bladder is  unremarkable.  Spleen: The spleen is normal size.  Pancreas:  There is diffuse peripancreatic inflammation.  GI systems and  mesentery: No evidence of bowel obstruction.  The appendix is  visualized and unremarkable in appearance.  No significant  mesenteric inflammation.  Lymph nodes: No  definite  pathologically enlarged abdominal or pelvic lymph nodes present.  Vessels: The aorta and abdominal arteries are grossly patent.  The IVC and portal vein are patent and grossly unremarkable.  Peritoneum: No free intraperitoneal fluid or pneumoperitoneum.  Pelvic viscera: No acute findings.  Body wall: No body wall  contusion. No significant body wall hernias.  Bones: No acute  fracture.      Impression:      Acute pancreatitis.    Authenticated and Electronically Signed by Hardik Canela MD on  03/30/2024 09:26:16 PM            Echo:      Condition on Discharge:      Stable.    Code status during the hospital stay:    Code Status and Medical Interventions:   Ordered at: 03/30/24 2348     Code Status (Patient has no pulse and is not breathing):    CPR (Attempt to Resuscitate)     Medical Interventions (Patient has pulse or is breathing):    Full Support     Discharge Disposition:    Home or Self Care    Discharge Medications:       Discharge Medications        New Medications        Instructions Start Date   atorvastatin 80 MG tablet  Commonly known as: LIPITOR   Take 1 tablet by mouth Every Night      fenofibrate 145 MG tablet  Commonly known as: TRICOR   Take 1 tablet by mouth Daily   Start Date: April 4, 2024     oxyCODONE-acetaminophen 7.5-325 MG per tablet  Commonly known as: PERCOCET   1 tablet, Oral, Every 6 Hours PRN      sennosides-docusate 8.6-50 MG per tablet  Commonly known as: PERICOLACE   2 tablets, Oral, 2 Times Daily             Continue These Medications        Instructions Start Date   bisacodyl 5 MG EC tablet  Commonly known as: bisacodyl   5 mg, Oral, Daily PRN      Enbrel SureClick 50 MG/ML solution auto-injector  Generic drug: Etanercept   50 mg by Subcutaneous Infusion route 1 (One) Time Per Week. TAKES ON SUNDAYS      polyethylene glycol 17 GM/SCOOP powder  Commonly known as: MIRALAX   17 g po daily prn constipaton             Discharge Diet:     Diet Instructions       Diet:  Gastrointestinal Diets; Fat-Restricted; Thin (IDDSI 0)      Discharge Diet: Gastrointestinal Diets    Gastrointestinal Diet: Fat-Restricted    Fluid Consistency: Thin (IDDSI 0)          Activity at Discharge:       Follow-up Appointments:     Follow-up Information       Tyra Bro APRN Follow up on 4/9/2024.    Specialties: Nurse Practitioner, Family Medicine  Why: @ 9:15  Contact information:  [address] [place] [address] [phone]                           Future Appointments   Date Time Provider Department Center   4/9/2024  9:15 AM Tyra Bro APRN [facility]     Test Results Pending at Discharge:           Bonnie Catalan DO  04/03/24  12:48 EDT    Time: I spent 28 minutes on this discharge activity which included: face-to-face encounter with the patient, reviewing the data in the system, coordination of the care with the nursing staff as well as consultants, documentation, and entering orders.     Dictated utilizing Dragon dictation.

## 2024-04-03 NOTE — CASE MANAGEMENT/SOCIAL WORK
Case Management Discharge Note                Selected Continued Care - Admitted Since 3/30/2024       Destination    No services have been selected for the patient.                Durable Medical Equipment    No services have been selected for the patient.                Dialysis/Infusion    No services have been selected for the patient.                Home Medical Care    No services have been selected for the patient.                Therapy    No services have been selected for the patient.                Community Resources    No services have been selected for the patient.                [facility]    No services have been selected for the patient.                    Transportation Services  Private: Car    Final Discharge Disposition Code: 01 - home or self-care

## 2024-04-04 ENCOUNTER — TRANSITIONAL CARE MANAGEMENT TELEPHONE ENCOUNTER (OUTPATIENT)
Dept: CALL CENTER | Facility: HOSPITAL | Age: 39
End: 2024-04-04
Payer: COMMERCIAL

## 2024-04-04 NOTE — OUTREACH NOTE
Prep Survey      Flowsheet Row Responses   [facility] facility patient discharged from? [place]   Is LACE score < 7 ? No   Eligibility [facility]   Date of Admission 03/30/24   Date of Discharge 04/03/24   Discharge Disposition Home or Self Care   Discharge diagnosis Acue pancreatitis   Does the patient have one of the following disease processes/diagnoses(primary or secondary)? Other   Does the patient have Home health ordered? No   Is there a DME ordered? No   Prep survey completed? Yes            RODERICK WADSWORTH - Registered Nurse

## 2024-04-04 NOTE — OUTREACH NOTE
Call Center TCM Note      Flowsheet Row Responses   [facility] patient discharged from? [place]   Does the patient have one of the following disease processes/diagnoses(primary or secondary)? Other   TCM attempt successful? Yes   Call start time 0839   Call end time 0840   Discharge diagnosis Acue pancreatitis   Meds reviewed with patient/caregiver? Yes   Is the patient having any side effects they believe may be caused by any medication additions or changes? No   Does the patient have all medications ordered at discharge? Yes   Is the patient taking all medications as directed (includes completed medication regime)? Yes   Comments HOSP DC FU / NEW Pt appt 4/9/24 915 am   Does the patient have an appointment with their PCP within 7-14 days of discharge? Yes   Has home health visited the patient within 72 hours of discharge? N/A   Psychosocial issues? No   Did the patient receive a copy of their discharge instructions? Yes   Nursing interventions Reviewed instructions with patient   What is the patient's perception of their health status since discharge? Improving   Is the patient/caregiver able to teach back signs and symptoms related to disease process for when to call PCP? Yes   Is the patient/caregiver able to teach back signs and symptoms related to disease process for when to call 911? Yes   Is the patient/caregiver able to teach back the hierarchy of who to call/visit for symptoms/problems? PCP, Specialist, Home health nurse, Urgent Care, ED, 911 Yes   TCM call completed? Yes   Wrap up additional comments Pt reports he is improving. No needs.   Call end time 0840            Lupe Willis RN    4/4/2024, 08:40 EDT

## 2024-04-04 NOTE — PAYOR COMM NOTE
"  D/C summary for Authorization #[id number] ; Received previous approval for 11 days  UR Manager; Shy Degroot [phone] and fax [phone]      Rafy Gibson (39 y.o. Male)       Date of Birth   1985    Social Security Number       Address   [address] [id number]    Home Phone       MRN   [id number]       Taoist   None    Marital Status                               Admission Date   3/30/24    Admission Type   Emergency    Admitting Provider   Nadeem Thurston MD    Attending Provider       Department, Room/Bed   [facility] TELEMETRY 3, 310/1       Discharge Date   4/3/2024    Discharge Disposition   Home or Self Care    Discharge Destination                                 Attending Provider: (none)   Allergies: No Known Allergies    Isolation: None   Infection: None   Code Status: Prior    Ht: 175.3 cm (69\")   Wt: 113 kg (249 lb 1.9 oz)    Admission Cmt: None   Principal Problem: Acute pancreatitis [K85.90]                   Active Insurance as of 3/30/2024       Primary Coverage       Payor Plan Insurance Group Employer/Plan Group    Formerly [facility] BLUE [facility]       Payor Plan Address Payor Plan Phone Number Payor Plan Fax Number Effective Dates    [address]   2008 - None Entered    [place] [address]         Subscriber Name Subscriber Birth Date Member ID       RAFY GIBOSN 1985 [id number]                     Emergency Contacts        (Rel.) Home Phone Work Phone Mobile Phone    QUIRINO GIBSON (Spouse) [phone] -- --              Physician Progress Notes (last 24 hours)  Notes from 24 0854 through 24 0854   No notes of this type exist for this encounter.          Discharge Summary        Bonnie Catalan DO at 24 1050              [facility] HOSPITALIST   DISCHARGE SUMMARY      Name:  Rafy Gibson   Age:  39 y.o.  Sex:  male  :  1985  MRN:  [id number]   Visit Number:  " [id number]    Admission Date:  3/30/2024  Date of Discharge:  4/3/2024  Primary Care Physician:  Provider, No Known    Important issues to note:    Please follow-up with primary care provider, we have given you a new doctor in [place] to establish care  Needs PCP to check his lipid panel and triglycerides in about 2 to 3 months  Has been started on Lipitor and fenofibrate  Return to the hospital with any severe abdominal pain, nausea, vomiting or other issue.    Discharge Diagnoses:     Hypertriglyceridemia induced pancreatitis, POA  Cholelithiasis without cholecystitis  Obesity    Problem List:     Active Hospital Problems    Diagnosis  POA    **Acute pancreatitis [K85.90]  Yes    Pancreatitis [K85.90]  Yes      Resolved Hospital Problems   No resolved problems to display.     Presenting Problem:    Chief Complaint   Patient presents with    Abdominal Pain     LUQ LLQ pain since 1030 this morning. Hx of bladder mass/surgery      Consults:     Consulting Physician(s)                     None              Procedures Performed:        History of presenting illness/Hospital Course:    Patient is a 39-year-old with a history of psoriatic arthritis, hypertriglyceridemia, obesity, who presented from home with complaints of abdominal pain and nausea and vomiting.  He had apparently went to an urgent care, was told that he may have been constipated was given MiraLAX and a GI cocktail.  Was advised to go to the emergency room if symptoms worsen.  He subsequent presented to our emergency room.     Upon workup, he had evidence of elevated lipase creatinine 1.28 a white blood cell count of 16 and a CT scan abdomen pelvis showing acute pancreatitis.  Gallbladder ultrasound was showing hepatic steatosis and cholelithiasis without cholecystitis and no significant biliary ductal dilatation.  He was given IV pain control, antiemetics, fluids and admitted to the hospital service.     Upon further evaluation, patient evidence of  severe hypertriglyceridemia.  He was moved to the ICU and placed on insulin drip.  Over the last 24 hours his symptomatology has improved.  His triglycerides are less than 1000 now.  Upon questioning, patient did admit to being told about high triglycerides few years ago and he was on some medication for this but does not take it anymore.    Patient has fortunately significantly improved.  He is eating and drinking now, his diet was advanced from clear liquids to GI soft.  His lipase has normalized.  His triglycerides are down significantly.  He has some mild pain now.  Discussed short course of pain medication for a few days.  Discussed GI soft diet with low-fat upon discharge.    We have made arrangements for patient have new primary care provider in [place].  He will need his lipid panel and triglycerides checked in about 2 to 3 months after being on treatment.  He has been prescribed Lipitor 80 mg in addition to fenofibrate.  Of note, if has any recurrent issues despite treatment of hypertriglyceridemia, he may need further workup of his gallbladder.  Alcohol cessation discussed.    Patient was given work excuse as well as excuse for his annual training for  service this month.    Vital Signs:    Temp:  [97.5 °F (36.4 °C)-100.2 °F (37.9 °C)] 98.1 °F (36.7 °C)  Heart Rate:  [] 93  Resp:  [16-18] 18  BP: (121-146)/(77-92) 121/77    Physical Exam:    General Appearance:  Alert and cooperative.  NAD   Head:  Atraumatic and normocephalic.   Eyes: Conjunctivae and sclerae normal, no icterus. No pallor.   Ears:  Ears with no abnormalities noted.   Throat: No oral lesions, no thrush, oral mucosa moist.   Neck: Supple, trachea midline, no thyromegaly.   Back:   No kyphoscoliosis present. No tenderness to palpation.   Lungs:   Breath sounds heard bilaterally equally.  No crackles or wheezing. No Pleural rub or bronchial breathing.   Heart:  Normal S1 and S2, no murmur, no gallop, no rub. No JVD.   Abdomen:    Normal bowel sounds, no masses, no organomegaly.  Mild tenderness   Extremities: Supple, no edema, no cyanosis, no clubbing.   Pulses: Pulses palpable bilaterally.   Skin: No bleeding or rash.   Neurologic: Alert and oriented x 3. No facial asymmetry. Moves all four limbs. No tremors.     Pertinent Lab Results:     Results from last 7 days   Lab Units 04/03/24  0600 04/02/24  0434 04/01/24  2208 03/31/24  2156 03/31/24  1447 03/30/24  1842   SODIUM mmol/L 135* 134* 129*   < > 130* 133*   POTASSIUM mmol/L 4.4 4.0  4.0 3.6   < > 3.9 4.2   CHLORIDE mmol/L 95* 97* 93*   < > 93* 96*   CO2 mmol/L 27.1 27.1 26.9   < > 23.8 24.8   BUN mg/dL 10 5* 5*   < > 13 18   CREATININE mg/dL 0.88 0.91 0.99   < > 0.95 1.28*   CALCIUM mg/dL 8.5* 8.1* 8.1*   < > 7.6* 9.0   BILIRUBIN mg/dL 0.5  --   --   --  0.4 0.3   ALK PHOS U/L 95  --   --   --  60 63   ALT (SGPT) U/L 136*  --   --   --  <5 5   AST (SGOT) U/L 49*  --   --   --  <5 5   GLUCOSE mg/dL 120* 127* 108*   < > 109* 117*    < > = values in this interval not displayed.     Results from last 7 days   Lab Units 04/03/24  0559 04/02/24  0434 04/01/24  0448   WBC 10*3/mm3 9.65 11.22* 11.64*   HEMOGLOBIN g/dL 11.5* 12.1* 12.7*   HEMATOCRIT % 35.4* 35.8* 36.6*   PLATELETS 10*3/mm3 209 179 192         Results from last 7 days   Lab Units 03/30/24  1842   HSTROP T ng/L <6             Results from last 7 days   Lab Units 04/03/24  0600   LIPASE U/L 95*               Pertinent Radiology Results:    Imaging Results (All)       Procedure Component Value Units Date/Time    US Gallbladder [[id number]] Collected: 03/30/24 2051     Updated: 03/30/24 2127    Narrative:      FINAL REPORT    TECHNIQUE:  sonographic images of the right upper quadrant were obtained.    CLINICAL HISTORY:  gallstones on ct imaging, pancreatitis, eval for bile duct  obstruction or dila    FINDINGS:  There is a diffusely echogenic liver.  There is no visualized  suspicious hepatic lesion.  There is no significant  biliary  ductal dilatation.  There are gallstones without cholecystitis.  The gallbladder is normal.  There is an unremarkable right  kidney.  The pancreas is obscured by bowel gas.      Impression:      Hepatic steatosis.  Cholelithiasis without cholecystitis.    Authenticated and Electronically Signed by Hardik Canela MD on  03/30/2024 09:26:15 PM    CT Abdomen Pelvis With Contrast [[id number]] Collected: 03/30/24 2050     Updated: 03/30/24 2127    Narrative:      FINAL REPORT    TECHNIQUE:  Axial CT images were performed from the lung bases through the  symphysis pubis after the administration of intravenous  contrast.  This study was performed with techniques to keep  radiation doses as low as reasonably achievable (ALARA).  Individualized dose reduction techniques using automated  exposure control or adjustment of mA and/or kV according to the  patient's size were employed.    CLINICAL HISTORY:  epigasttric, LUQ LLQ abdominal pain nausea rule out pancreatitis    FINDINGS:  LOWER CHEST: The heart is normal size.  The lung bases are  clear.  ABDOMEN/PELVIS:  Liver, gallbladder and bile ducts: The  liver enhances homogeneously without suspicious focal hepatic  lesion.  There are gallstones without cholecystitis.  There is  no definite biliary duct dilatation.  Adrenal glands: The  adrenal glands are morphologically unremarkable without  suspicious lesion.  Kidneys, ureter and urinary bladder: No  suspicious renal lesion.  No hydronephrosis.  Urinary bladder is  unremarkable.  Spleen: The spleen is normal size.  Pancreas:  There is diffuse peripancreatic inflammation.  GI systems and  mesentery: No evidence of bowel obstruction.  The appendix is  visualized and unremarkable in appearance.  No significant  mesenteric inflammation.  Lymph nodes: No definite  pathologically enlarged abdominal or pelvic lymph nodes present.  Vessels: The aorta and abdominal arteries are grossly patent.  The IVC and portal vein are  patent and grossly unremarkable.  Peritoneum: No free intraperitoneal fluid or pneumoperitoneum.  Pelvic viscera: No acute findings.  Body wall: No body wall  contusion. No significant body wall hernias.  Bones: No acute  fracture.      Impression:      Acute pancreatitis.    Authenticated and Electronically Signed by Hardik Canela MD on  03/30/2024 09:26:16 PM            Echo:      Condition on Discharge:      Stable.    Code status during the hospital stay:    Code Status and Medical Interventions:   Ordered at: 03/30/24 9340     Code Status (Patient has no pulse and is not breathing):    CPR (Attempt to Resuscitate)     Medical Interventions (Patient has pulse or is breathing):    Full Support     Discharge Disposition:    Home or Self Care    Discharge Medications:       Discharge Medications        New Medications        Instructions Start Date   atorvastatin 80 MG tablet  Commonly known as: LIPITOR   Take 1 tablet by mouth Every Night      fenofibrate 145 MG tablet  Commonly known as: TRICOR   Take 1 tablet by mouth Daily   Start Date: April 4, 2024     oxyCODONE-acetaminophen 7.5-325 MG per tablet  Commonly known as: PERCOCET   1 tablet, Oral, Every 6 Hours PRN      sennosides-docusate 8.6-50 MG per tablet  Commonly known as: PERICOLACE   2 tablets, Oral, 2 Times Daily             Continue These Medications        Instructions Start Date   bisacodyl 5 MG EC tablet  Commonly known as: bisacodyl   5 mg, Oral, Daily PRN      Enbrel SureClick 50 MG/ML solution auto-injector  Generic drug: Etanercept   50 mg by Subcutaneous Infusion route 1 (One) Time Per Week. TAKES ON SUNDAYS      polyethylene glycol 17 GM/SCOOP powder  Commonly known as: MIRALAX   17 g po daily prn constipaton             Discharge Diet:     Diet Instructions       Diet: Gastrointestinal Diets; Fat-Restricted; Thin (IDDSI 0)      Discharge Diet: Gastrointestinal Diets    Gastrointestinal Diet: Fat-Restricted    Fluid Consistency: Thin (IDDSI 0)           Activity at Discharge:       Follow-up Appointments:     Follow-up Information       Tyra Bro APRN Follow up on 4/9/2024.    Specialties: Nurse Practitioner, Family Medicine  Why: @ 9:15  Contact information:  Melissa Malik  [facility]  [facility] [place] [address] [phone]                           Future Appointments   Date Time Provider Department Center   4/9/2024  9:15 AM Tyra Bro APRN [facility]     Test Results Pending at Discharge:           Bonnie Catalan DO  04/03/24  12:48 EDT    Time: I spent 28 minutes on this discharge activity which included: face-to-face encounter with the patient, reviewing the data in the system, coordination of the care with the nursing staff as well as consultants, documentation, and entering orders.     Dictated utilizing Dragon dictation.      Electronically signed by Bonnie Catalan DO at 04/03/24 2708

## 2024-04-09 ENCOUNTER — OFFICE VISIT (OUTPATIENT)
Dept: INTERNAL MEDICINE | Facility: CLINIC | Age: 39
End: 2024-04-09
Payer: COMMERCIAL

## 2024-04-09 VITALS
SYSTOLIC BLOOD PRESSURE: 116 MMHG | TEMPERATURE: 97.3 F | HEIGHT: 69 IN | DIASTOLIC BLOOD PRESSURE: 79 MMHG | HEART RATE: 73 BPM | WEIGHT: 246 LBS | OXYGEN SATURATION: 97 % | BODY MASS INDEX: 36.43 KG/M2

## 2024-04-09 DIAGNOSIS — Z09 HOSPITAL DISCHARGE FOLLOW-UP: Primary | ICD-10-CM

## 2024-04-09 DIAGNOSIS — M79.671 ACUTE FOOT PAIN, RIGHT: ICD-10-CM

## 2024-04-09 DIAGNOSIS — E78.1 HYPERTRIGLYCERIDEMIA: ICD-10-CM

## 2024-04-09 DIAGNOSIS — K85.90 ACUTE PANCREATITIS WITHOUT INFECTION OR NECROSIS, UNSPECIFIED PANCREATITIS TYPE: ICD-10-CM

## 2024-04-09 LAB
ALBUMIN SERPL-MCNC: 4.7 G/DL (ref 3.5–5.2)
ALBUMIN/GLOB SERPL: 1.2 G/DL
ALP SERPL-CCNC: 90 U/L (ref 39–117)
ALT SERPL-CCNC: 58 U/L (ref 1–41)
AST SERPL-CCNC: 30 U/L (ref 1–40)
BILIRUB SERPL-MCNC: 0.3 MG/DL (ref 0–1.2)
BUN SERPL-MCNC: 24 MG/DL (ref 6–20)
BUN/CREAT SERPL: 17 (ref 7–25)
CALCIUM SERPL-MCNC: 9.9 MG/DL (ref 8.6–10.5)
CHLORIDE SERPL-SCNC: 100 MMOL/L (ref 98–107)
CO2 SERPL-SCNC: 28.7 MMOL/L (ref 22–29)
CREAT SERPL-MCNC: 1.41 MG/DL (ref 0.76–1.27)
EGFRCR SERPLBLD CKD-EPI 2021: 64.6 ML/MIN/1.73
ERYTHROCYTE [DISTWIDTH] IN BLOOD BY AUTOMATED COUNT: 13.6 % (ref 12.3–15.4)
GLOBULIN SER CALC-MCNC: 3.8 GM/DL
GLUCOSE SERPL-MCNC: 97 MG/DL (ref 65–99)
HCT VFR BLD AUTO: 34.9 % (ref 37.5–51)
HGB BLD-MCNC: 11.4 G/DL (ref 13–17.7)
MCH RBC QN AUTO: 29.4 PG (ref 26.6–33)
MCHC RBC AUTO-ENTMCNC: 32.7 G/DL (ref 31.5–35.7)
MCV RBC AUTO: 89.9 FL (ref 79–97)
PLATELET # BLD AUTO: 374 10*3/MM3 (ref 140–450)
POTASSIUM SERPL-SCNC: 4.7 MMOL/L (ref 3.5–5.2)
PROT SERPL-MCNC: 8.5 G/DL (ref 6–8.5)
RBC # BLD AUTO: 3.88 10*6/MM3 (ref 4.14–5.8)
SODIUM SERPL-SCNC: 139 MMOL/L (ref 136–145)
WBC # BLD AUTO: 6.1 10*3/MM3 (ref 3.4–10.8)

## 2024-04-09 PROCEDURE — 99495 TRANSJ CARE MGMT MOD F2F 14D: CPT | Performed by: NURSE PRACTITIONER

## 2024-04-09 NOTE — PROGRESS NOTES
Transitional Care Follow Up Visit  Subjective     Ciera Gibson is a 39 y.o. male who presents for a transitional care management visit.    Within 48 business hours after discharge our office contacted him via telephone to coordinate his care and needs.      I reviewed and discussed the details of that call along with the discharge summary, hospital problems, inpatient lab results, inpatient diagnostic studies, and consultation reports with Ciera.     Current outpatient and discharge medications have been reconciled for the patient.  Reviewed by: ASHANTI Barreto          4/3/2024    10:02 PM   Date of TCM Phone Call   [facility]   Date of Admission 3/30/2024   Date of Discharge 4/3/2024   Discharge Disposition Home or Self Care     Risk for Readmission (LACE) Score: 7 (4/3/2024  6:00 AM)      History of Present Illness   Course During Hospital Stay: Presented to [facility] ED on 3/30 with acute complaints of abdominal pain, nausea, and vomiting.  Workup revealed elevated lipase, white blood cell count, and CT abdomen and pelvis concerning for acute pancreatitis.  Gallbladder ultrasound showed hepatic steatosis with cholelithiasis without cholecystitis and no significant biliary ductal dilation.  He was admitted to the hospital and given IV pain control, antiemetics, and fluids.  Upon further evaluation he had severe hypertriglyceridemia and he was moved to the ICU and placed on insulin drip.  Symptomatically improved within 24 hours.  Prior to discharge his triglycerides trended down to 1000.  He significantly improved and was able to advance his diet from clear liquids to GI soft.  Lipase normalized and triglycerides continue to trend down.  He was discharged home with new medication of atorvastatin 80 mg and fenofibrate 145 mg nightly.  He was given instruction to follow-up with primary care.    He is here today to follow-up from this recent hospitalization and to establish care with me.  He is tolerating his  atorvastatin and fenofibrate and taking as prescribed.  Denies dark urine and myalgia.  He is working on following a healthy diet low in carbohydrates, sugar, and processed foods.  He has not increased his exercise as of yet but does plan to.  He does not drink much alcohol, socially.  He notes his sister also suffers from hypertriglyceridemia.  He was seen at [facility] yesterday for acute right heel pain, this revealed bone spurring but no other findings on x-ray.  He started anti-inflammatories and has helped.     Lab Results   Component Value Date    GLUCOSE 120 (H) 04/03/2024    BUN 10 04/03/2024    CREATININE 0.88 04/03/2024    EGFR 111.5 04/03/2024    BCR 11.4 04/03/2024    K 4.4 04/03/2024    CO2 27.1 04/03/2024    CALCIUM 8.5 (L) 04/03/2024    ALBUMIN 3.7 04/03/2024    BILITOT 0.5 04/03/2024    AST 49 (H) 04/03/2024     (H) 04/03/2024     Lab Results   Component Value Date    WBC 9.65 04/03/2024    HGB 11.5 (L) 04/03/2024    HCT 35.4 (L) 04/03/2024    MCV 92.2 04/03/2024     04/03/2024     Lab Results   Component Value Date    HGBA1C 6.30 (H) 04/01/2024     Lab Results   Component Value Date    CHOL 582 (H) 03/31/2024    TRIG 684 (H) 04/03/2024    HDL 15 (L) 03/31/2024    LDL  03/31/2024      Comment:      Unable to calculate    LDL 23 03/31/2024       The following portions of the patient's history were reviewed and updated as appropriate: allergies, current medications, past family history, past medical history, past social history, past surgical history, and problem list.      Objective   Physical Exam  Vitals and nursing note reviewed.   Constitutional:       General: He is not in acute distress.     Appearance: Normal appearance. He is obese. He is not toxic-appearing.   Eyes:      Pupils: Pupils are equal, round, and reactive to light.   Neck:      Vascular: No carotid bruit.   Cardiovascular:      Rate and Rhythm: Normal rate and regular rhythm.      Heart sounds: Normal heart sounds. No  murmur heard.  Pulmonary:      Effort: Pulmonary effort is normal. No respiratory distress.      Breath sounds: Normal breath sounds. No wheezing.   Abdominal:      General: Bowel sounds are normal. There is no distension.      Palpations: Abdomen is soft.      Tenderness: There is no abdominal tenderness. There is no guarding.   Musculoskeletal:      Cervical back: Neck supple. No tenderness.   Skin:     General: Skin is warm and dry.      Findings: No rash.   Neurological:      General: No focal deficit present.      Mental Status: He is alert.   Psychiatric:         Mood and Affect: Mood normal.         Behavior: Behavior normal.         Assessment & Plan   Diagnoses and all orders for this visit:    1. Hospital discharge follow-up (Primary)  -     Comprehensive metabolic panel  -     CBC No Differential  Labs, imaging, and notes from recent hospitalization reviewed today.  2. Acute pancreatitis without infection or necrosis, unspecified pancreatitis type  -     Comprehensive metabolic panel  -     CBC No Differential  Resolving, no further pain.  Continue advancing diet and continue current medication regimen.  Continue abstaining from alcohol for at least 90 days.  Healthy diet encouraged.  3. Hypertriglyceridemia  -     Comprehensive metabolic panel  -     CBC No Differential  Will update lipid panel next visit with A1c.  Continue healthy diet low in processed foods, atorvastatin, and fenofibrate nightly   4. Acute foot pain, right  Improving with anti-inflammatories, continue meloxicam and stretching.  Follow-up if not improving and will send to podiatry.

## 2024-04-16 ENCOUNTER — READMISSION MANAGEMENT (OUTPATIENT)
Dept: CALL CENTER | Facility: HOSPITAL | Age: 39
End: 2024-04-16
Payer: COMMERCIAL

## 2024-04-16 NOTE — OUTREACH NOTE
Medical Week 2 Survey      Flowsheet Row Responses   [facility] patient discharged from? Main   Does the patient have one of the following disease processes/diagnoses(primary or secondary)? Other   Week 2 attempt successful? Yes   Call start time 0930   Discharge diagnosis Acue pancreatitis   Call end time 0931   Meds reviewed with patient/caregiver? Yes   Is the patient having any side effects they believe may be caused by any medication additions or changes? No   Does the patient have all medications ordered at discharge? Yes   Is the patient taking all medications as directed (includes completed medication regime)? Yes   Does the patient have a primary care provider?  Yes   Does the patient have an appointment with their PCP within 7 days of discharge? Yes   Has the patient kept scheduled appointments due by today? Yes   Has home health visited the patient within 72 hours of discharge? N/A   Psychosocial issues? No   Did the patient receive a copy of their discharge instructions? Yes   Nursing interventions Reviewed instructions with patient   What is the patient's perception of their health status since discharge? Improving   Week 2 Call Completed? Yes   Graduated Yes   Call end time 0931            Argenis ARREOLA - Registered Nurse

## 2024-04-19 ENCOUNTER — PATIENT ROUNDING (BHMG ONLY) (OUTPATIENT)
Dept: INTERNAL MEDICINE | Facility: CLINIC | Age: 39
End: 2024-04-19
Payer: COMMERCIAL

## 2024-04-19 NOTE — PROGRESS NOTES
A [employer] message has been sent to patient for PATIENT ROUNDING with [facility].

## 2024-04-23 ENCOUNTER — HOSPITAL ENCOUNTER (EMERGENCY)
Facility: HOSPITAL | Age: 39
Discharge: HOME | End: 2024-04-23
Attending: STUDENT IN AN ORGANIZED HEALTH CARE EDUCATION/TRAINING PROGRAM | Admitting: STUDENT IN AN ORGANIZED HEALTH CARE EDUCATION/TRAINING PROGRAM
Payer: COMMERCIAL

## 2024-04-23 ENCOUNTER — APPOINTMENT (OUTPATIENT)
Dept: CT IMAGING | Facility: HOSPITAL | Age: 39
End: 2024-04-23
Payer: COMMERCIAL

## 2024-04-23 VITALS
TEMPERATURE: 97.9 F | HEIGHT: 69 IN | SYSTOLIC BLOOD PRESSURE: 136 MMHG | WEIGHT: 240 LBS | OXYGEN SATURATION: 94 % | BODY MASS INDEX: 35.55 KG/M2 | DIASTOLIC BLOOD PRESSURE: 82 MMHG | HEART RATE: 67 BPM | RESPIRATION RATE: 18 BRPM

## 2024-04-23 DIAGNOSIS — K86.3 PANCREATIC PSEUDOCYST: ICD-10-CM

## 2024-04-23 DIAGNOSIS — K80.20 CALCULUS OF GALLBLADDER WITHOUT CHOLECYSTITIS WITHOUT OBSTRUCTION: ICD-10-CM

## 2024-04-23 DIAGNOSIS — R10.11 RIGHT UPPER QUADRANT PAIN: Primary | ICD-10-CM

## 2024-04-23 DIAGNOSIS — K86.1 OTHER CHRONIC PANCREATITIS: ICD-10-CM

## 2024-04-23 LAB
ALBUMIN SERPL-MCNC: 5 G/DL (ref 3.5–5.2)
ALBUMIN/GLOB SERPL: 1.3 G/DL
ALP SERPL-CCNC: 87 U/L (ref 39–117)
ALT SERPL W P-5'-P-CCNC: 37 U/L (ref 1–41)
ANION GAP SERPL CALCULATED.3IONS-SCNC: 10.8 MMOL/L (ref 5–15)
AST SERPL-CCNC: 28 U/L (ref 1–40)
BASOPHILS # BLD AUTO: 0.08 10*3/MM3 (ref 0–0.2)
BASOPHILS NFR BLD AUTO: 0.7 % (ref 0–1.5)
BILIRUB SERPL-MCNC: 0.3 MG/DL (ref 0–1.2)
BUN SERPL-MCNC: 27 MG/DL (ref 6–20)
BUN/CREAT SERPL: 21.4 (ref 7–25)
CALCIUM SPEC-SCNC: 9.7 MG/DL (ref 8.6–10.5)
CHLORIDE SERPL-SCNC: 102 MMOL/L (ref 98–107)
CO2 SERPL-SCNC: 26.2 MMOL/L (ref 22–29)
CREAT SERPL-MCNC: 1.26 MG/DL (ref 0.76–1.27)
DEPRECATED RDW RBC AUTO: 45.7 FL (ref 37–54)
EGFRCR SERPLBLD CKD-EPI 2021: 73.9 ML/MIN/1.73
EOSINOPHIL # BLD AUTO: 0.15 10*3/MM3 (ref 0–0.4)
EOSINOPHIL NFR BLD AUTO: 1.3 % (ref 0.3–6.2)
ERYTHROCYTE [DISTWIDTH] IN BLOOD BY AUTOMATED COUNT: 13.6 % (ref 12.3–15.4)
GLOBULIN UR ELPH-MCNC: 3.8 GM/DL
GLUCOSE SERPL-MCNC: 121 MG/DL (ref 65–99)
HCT VFR BLD AUTO: 38.5 % (ref 37.5–51)
HGB BLD-MCNC: 12.8 G/DL (ref 13–17.7)
HOLD SPECIMEN: NORMAL
HOLD SPECIMEN: NORMAL
IMM GRANULOCYTES # BLD AUTO: 0.04 10*3/MM3 (ref 0–0.05)
IMM GRANULOCYTES NFR BLD AUTO: 0.4 % (ref 0–0.5)
LIPASE SERPL-CCNC: 54 U/L (ref 13–60)
LYMPHOCYTES # BLD AUTO: 4.22 10*3/MM3 (ref 0.7–3.1)
LYMPHOCYTES NFR BLD AUTO: 37.9 % (ref 19.6–45.3)
MCH RBC QN AUTO: 30.2 PG (ref 26.6–33)
MCHC RBC AUTO-ENTMCNC: 33.2 G/DL (ref 31.5–35.7)
MCV RBC AUTO: 90.8 FL (ref 79–97)
MONOCYTES # BLD AUTO: 0.64 10*3/MM3 (ref 0.1–0.9)
MONOCYTES NFR BLD AUTO: 5.7 % (ref 5–12)
NEUTROPHILS NFR BLD AUTO: 54 % (ref 42.7–76)
NEUTROPHILS NFR BLD AUTO: 6.01 10*3/MM3 (ref 1.7–7)
NRBC BLD AUTO-RTO: 0 /100 WBC (ref 0–0.2)
PLATELET # BLD AUTO: 344 10*3/MM3 (ref 140–450)
PMV BLD AUTO: 8.9 FL (ref 6–12)
POTASSIUM SERPL-SCNC: 4.2 MMOL/L (ref 3.5–5.2)
PROT SERPL-MCNC: 8.8 G/DL (ref 6–8.5)
RBC # BLD AUTO: 4.24 10*6/MM3 (ref 4.14–5.8)
SODIUM SERPL-SCNC: 139 MMOL/L (ref 136–145)
TROPONIN T SERPL HS-MCNC: <6 NG/L
WBC NRBC COR # BLD AUTO: 11.14 10*3/MM3 (ref 3.4–10.8)
WHOLE BLOOD HOLD COAG: NORMAL
WHOLE BLOOD HOLD SPECIMEN: NORMAL

## 2024-04-23 PROCEDURE — 25510000001 IOPAMIDOL 61 % SOLUTION: Performed by: STUDENT IN AN ORGANIZED HEALTH CARE EDUCATION/TRAINING PROGRAM

## 2024-04-23 PROCEDURE — 85025 COMPLETE CBC W/AUTO DIFF WBC: CPT | Performed by: STUDENT IN AN ORGANIZED HEALTH CARE EDUCATION/TRAINING PROGRAM

## 2024-04-23 PROCEDURE — 84484 ASSAY OF TROPONIN QUANT: CPT | Performed by: STUDENT IN AN ORGANIZED HEALTH CARE EDUCATION/TRAINING PROGRAM

## 2024-04-23 PROCEDURE — 80053 COMPREHEN METABOLIC PANEL: CPT | Performed by: STUDENT IN AN ORGANIZED HEALTH CARE EDUCATION/TRAINING PROGRAM

## 2024-04-23 PROCEDURE — 83690 ASSAY OF LIPASE: CPT | Performed by: STUDENT IN AN ORGANIZED HEALTH CARE EDUCATION/TRAINING PROGRAM

## 2024-04-23 PROCEDURE — 25810000003 SODIUM CHLORIDE 0.9 % SOLUTION: Performed by: STUDENT IN AN ORGANIZED HEALTH CARE EDUCATION/TRAINING PROGRAM

## 2024-04-23 PROCEDURE — 96374 THER/PROPH/DIAG INJ IV PUSH: CPT

## 2024-04-23 PROCEDURE — 25010000002 MORPHINE PER 10 MG: Performed by: STUDENT IN AN ORGANIZED HEALTH CARE EDUCATION/TRAINING PROGRAM

## 2024-04-23 PROCEDURE — 96375 TX/PRO/DX INJ NEW DRUG ADDON: CPT

## 2024-04-23 PROCEDURE — 93005 ELECTROCARDIOGRAM TRACING: CPT | Performed by: STUDENT IN AN ORGANIZED HEALTH CARE EDUCATION/TRAINING PROGRAM

## 2024-04-23 PROCEDURE — 99285 EMERGENCY DEPT VISIT HI MDM: CPT

## 2024-04-23 PROCEDURE — 74177 CT ABD & PELVIS W/CONTRAST: CPT

## 2024-04-23 PROCEDURE — 25010000002 ONDANSETRON PER 1 MG: Performed by: STUDENT IN AN ORGANIZED HEALTH CARE EDUCATION/TRAINING PROGRAM

## 2024-04-23 RX ORDER — HYDROCODONE BITARTRATE AND ACETAMINOPHEN 5; 325 MG/1; MG/1
1 TABLET ORAL EVERY 6 HOURS PRN
Qty: 10 TABLET | Refills: 0 | Status: SHIPPED | OUTPATIENT
Start: 2024-04-23

## 2024-04-23 RX ORDER — SODIUM CHLORIDE 0.9 % (FLUSH) 0.9 %
10 SYRINGE (ML) INJECTION AS NEEDED
Status: DISCONTINUED | OUTPATIENT
Start: 2024-04-23 | End: 2024-04-23 | Stop reason: HOSPADM

## 2024-04-23 RX ORDER — ONDANSETRON 2 MG/ML
4 INJECTION INTRAMUSCULAR; INTRAVENOUS ONCE
Status: COMPLETED | OUTPATIENT
Start: 2024-04-23 | End: 2024-04-23

## 2024-04-23 RX ORDER — HYDROCODONE BITARTRATE AND ACETAMINOPHEN 5; 325 MG/1; MG/1
1 TABLET ORAL ONCE
Status: COMPLETED | OUTPATIENT
Start: 2024-04-23 | End: 2024-04-23

## 2024-04-23 RX ADMIN — IOPAMIDOL 100 ML: 612 INJECTION, SOLUTION INTRAVENOUS at 02:10

## 2024-04-23 RX ADMIN — SODIUM CHLORIDE 1000 ML: 9 INJECTION, SOLUTION INTRAVENOUS at 02:10

## 2024-04-23 RX ADMIN — MORPHINE SULFATE 4 MG: 4 INJECTION, SOLUTION INTRAMUSCULAR; INTRAVENOUS at 02:11

## 2024-04-23 RX ADMIN — ONDANSETRON 4 MG: 2 INJECTION INTRAMUSCULAR; INTRAVENOUS at 02:11

## 2024-04-23 RX ADMIN — HYDROCODONE BITARTRATE AND ACETAMINOPHEN 1 TABLET: 5; 325 TABLET ORAL at 03:30

## 2024-04-23 NOTE — DISCHARGE INSTRUCTIONS
Provided referrals to both general surgery for evaluation of gallstones along with gastroenterology for pancreatic/pancreatitis follow-up.  Continue Norco as prescribed for severe pain.  Switch back to mostly liquid diet to allow bowel rest.  If symptoms do not improve return to emergency department for repeat assessment.

## 2024-04-23 NOTE — Clinical Note
[facility] EMERGENCY DEPARTMENT  [address] [address]  Phone: [phone]    Ciera Gibson was seen and treated in our emergency department on 4/23/2024.  He may return to work on 04/25/2024.         Thank you for choosing [facility].    Yoni Cobos MD

## 2024-04-23 NOTE — ED PROVIDER NOTES
EMERGENCY DEPARTMENT ENCOUNTER    Pt Name: Ciera Gibson  MRN: [id number]  Pt :   1985  Room Number:  01SF/01  Date of encounter:  2024  PCP: Tyra Bro APRN  ED Provider: Yoni Cobos MD    Historian: Patient      HPI:  Chief Complaint: Abdominal pain        Context: Ciera Gibson is a 39 y.o. male who presents to the ED c/o abdominal pain.  Patient had recent admission for pancreatitis with left-sided abdominal pain.  Since discharge he was feeling much better until tonight when he started having right upper abdominal pain.  The pain radiates under the ribs on the right side.  Denies any vomiting or diarrhea.  Denies any fevers.      PAST MEDICAL HISTORY  Past Medical History:   Diagnosis Date    Acute pancreatitis     Psoriatic arthritis          PAST SURGICAL HISTORY  Past Surgical History:   Procedure Laterality Date    ADENOIDECTOMY      BLADDER SURGERY      COLONOSCOPY      HAND SURGERY Left     thumb repair    TONSILLECTOMY           FAMILY HISTORY  History reviewed. No pertinent family history.      SOCIAL HISTORY  Social History     Socioeconomic History    Marital status:    Tobacco Use    Smoking status: Former     Current packs/day: 1.00     Types: Cigarettes     Passive exposure: Past    Smokeless tobacco: Never   Vaping Use    Vaping status: Never Used   Substance and Sexual Activity    Alcohol use: Not Currently    Drug use: Never    Sexual activity: Defer         ALLERGIES  Patient has no known allergies.        REVIEW OF SYSTEMS  Review of Systems     All systems reviewed and negative except for those discussed in HPI.       PHYSICAL EXAM    I have reviewed the triage vital signs and nursing notes.    ED Triage Vitals [24 0134]   Temp Heart Rate Resp BP SpO2   97.9 °F (36.6 °C) 79 20 (!) 132/110 98 %      Temp src Heart Rate Source Patient Position BP Location FiO2 (%)   -- Monitor Sitting Left arm --       Physical Exam    General:  Awake, alert, no acute  distress  HEENT: Atraumatic, normocephalic, EOMI, PERRLA, mucous membranes moist  NECK:  Supple, atraumatic  Cardiovascular:  Regular rate, regular rhythm, no murmurs, rubs, or gallops.  Extremities well perfused   Respiratory:  Regular rate, clear lungs to auscultation bilaterally.  No rhonchi, rales, wheezing  Abdominal:  Soft, nondistended, some tenderness along right upper lateral abdomen.  No guarding or rebound.  No palpable masses  Extremity:  No visible bony abnormalities in all 4 extremities.  Full range of motion of all extremities.  Skin:  Warm and dry.  No rashes  Neuro:  AAOx3, GCS 15. Cranial nerves 2-12 grossly intact.  No focal strength or sensation deficits.  Psych:  Mood and affect appropriate.        LAB RESULTS  Recent Results (from the past 24 hour(s))   Comprehensive Metabolic Panel    Collection Time: 04/23/24  1:38 AM    Specimen: Blood   Result Value Ref Range    Glucose 121 (H) 65 - 99 mg/dL    BUN 27 (H) 6 - 20 mg/dL    Creatinine 1.26 0.76 - 1.27 mg/dL    Sodium 139 136 - 145 mmol/L    Potassium 4.2 3.5 - 5.2 mmol/L    Chloride 102 98 - 107 mmol/L    CO2 26.2 22.0 - 29.0 mmol/L    Calcium 9.7 8.6 - 10.5 mg/dL    Total Protein 8.8 (H) 6.0 - 8.5 g/dL    Albumin 5.0 3.5 - 5.2 g/dL    ALT (SGPT) 37 1 - 41 U/L    AST (SGOT) 28 1 - 40 U/L    Alkaline Phosphatase 87 39 - 117 U/L    Total Bilirubin 0.3 0.0 - 1.2 mg/dL    Globulin 3.8 gm/dL    A/G Ratio 1.3 g/dL    BUN/Creatinine Ratio 21.4 7.0 - 25.0    Anion Gap 10.8 5.0 - 15.0 mmol/L    eGFR 73.9 >60.0 mL/min/1.73   Lipase    Collection Time: 04/23/24  1:38 AM    Specimen: Blood   Result Value Ref Range    Lipase 54 13 - 60 U/L   Single High Sensitivity Troponin T    Collection Time: 04/23/24  1:38 AM    Specimen: Blood   Result Value Ref Range    HS Troponin T <6 <22 ng/L   Green Top (Gel)    Collection Time: 04/23/24  1:38 AM   Result Value Ref Range    Extra Tube Hold for add-ons.    Lavender Top    Collection Time: 04/23/24  1:38 AM    Result Value Ref Range    Extra Tube hold for add-on    Gold Top - SST    Collection Time: 04/23/24  1:38 AM   Result Value Ref Range    Extra Tube Hold for add-ons.    Light Blue Top    Collection Time: 04/23/24  1:38 AM   Result Value Ref Range    Extra Tube Hold for add-ons.    CBC Auto Differential    Collection Time: 04/23/24  1:38 AM    Specimen: Blood   Result Value Ref Range    WBC 11.14 (H) 3.40 - 10.80 10*3/mm3    RBC 4.24 4.14 - 5.80 10*6/mm3    Hemoglobin 12.8 (L) 13.0 - 17.7 g/dL    Hematocrit 38.5 37.5 - 51.0 %    MCV 90.8 79.0 - 97.0 fL    MCH 30.2 26.6 - 33.0 pg    MCHC 33.2 31.5 - 35.7 g/dL    RDW 13.6 12.3 - 15.4 %    RDW-SD 45.7 37.0 - 54.0 fl    MPV 8.9 6.0 - 12.0 fL    Platelets 344 140 - 450 10*3/mm3    Neutrophil % 54.0 42.7 - 76.0 %    Lymphocyte % 37.9 19.6 - 45.3 %    Monocyte % 5.7 5.0 - 12.0 %    Eosinophil % 1.3 0.3 - 6.2 %    Basophil % 0.7 0.0 - 1.5 %    Immature Grans % 0.4 0.0 - 0.5 %    Neutrophils, Absolute 6.01 1.70 - 7.00 10*3/mm3    Lymphocytes, Absolute 4.22 (H) 0.70 - 3.10 10*3/mm3    Monocytes, Absolute 0.64 0.10 - 0.90 10*3/mm3    Eosinophils, Absolute 0.15 0.00 - 0.40 10*3/mm3    Basophils, Absolute 0.08 0.00 - 0.20 10*3/mm3    Immature Grans, Absolute 0.04 0.00 - 0.05 10*3/mm3    nRBC 0.0 0.0 - 0.2 /100 WBC       If labs were ordered, I independently reviewed the results and considered them in treating the patient.        RADIOLOGY  CT Abdomen Pelvis With Contrast    Result Date: 4/23/2024  FINAL REPORT TECHNIQUE: null CLINICAL HISTORY: Right upper abdominal pain COMPARISON: null FINDINGS: CT abdomen and pelvis with contrast Comparison: None Findings: There is minimal bibasilar atelectasis. There is cholelithiasis. Gallbladder appears otherwise normal. There is no biliary duct dilation. There is mild peripancreatic fat stranding consistent with interstitial edematous pancreatitis. There is no pancreatic necrosis. There is a small amount of fluid along the superior margin  of the pancreatic tail measuring about 3 x 2 cm. The liver, spleen, adrenal glands, and kidneys are unremarkable. The appendix is normal. Remainder of the gastrointestinal tract is unremarkable. There are small bilateral fat containing inguinal hernias. Aorta and IVC are normal. There are no enlarged lymph nodes. There is no fracture or suspicious lytic or sclerotic lesion.     Impression: 1. Pancreatitis with small fluid collection along the superior margin of the pancreatic tail suggestive of a developing pseudocyst. 2. Cholelithiasis. Authenticated and Electronically Signed by Yair Rowland MD on 04/23/2024 02:43:35 AM     I ordered and independently reviewed the above noted radiographic studies.     See radiologist's dictation for official interpretation.        PROCEDURES    Procedures    ECG 12 Lead ED Triage Standing Order; Abdominal Pain (Upper)   Final Result          MEDICATIONS GIVEN IN ER    Medications   sodium chloride 0.9 % flush 10 mL (has no administration in time range)   HYDROcodone-acetaminophen (NORCO) 5-325 MG per tablet 1 tablet (has no administration in time range)   morphine injection 4 mg (4 mg Intravenous Given 4/23/24 0211)   ondansetron (ZOFRAN) injection 4 mg (4 mg Intravenous Given 4/23/24 0211)   sodium chloride 0.9 % bolus 1,000 mL (1,000 mL Intravenous New Bag 4/23/24 0210)   iopamidol (ISOVUE-300) 61 % injection 100 mL (100 mL Intravenous Given 4/23/24 0210)         MEDICAL DECISION MAKING, PROGRESS, and CONSULTS    All labs, if obtained, have been independently reviewed by me.  All radiology studies, if obtained, have been reviewed by me and the radiologist dictating the report.  All EKG's, if obtained, have been independently viewed and interpreted by me.      Discussion below represents my analysis of pertinent findings related to patient's condition, differential diagnosis, treatment plan and final disposition.    Ciera Gibson is a 39 y.o. male who presents to the ED c/o  abdominal pain.  Hemodynamically stable nontoxic in appearance upon arrival.  Differential includes was not limited to symptomatic cholelithiasis, cholecystitis, pancreatitis, pancreatic pseudocyst, gastritis, constipation, appendicitis.  Extensive labs ordered along with CT of abdomen pelvis with contrast.  Patient given 1 L bolus of IV fluids along with 4 mg of IV Zofran and 4 mg of IV morphine for pain control.    Labs show mild leukocytosis of 11.  No significant lipase elevation or LFT elevations.  CT scan shows mild peripancreatic inflammatory changes along with a small amount of fluid in the superior margin of the pancreatic tail measuring 3 x 2 cm consistent with pancreatic pseudocyst.  CT did show multiple gallstones but no evidence of cholecystitis or biliary ductal dilatation.  No pain on palpation of left side of abdomen, making acute pancreatitis less likely.  Potential gallbladder spasms/symptomatic cholelithiasis.  Patient given short course prescription for Norco for pain control and advised on strict return precautions along with liquid diet in case this is an early pancreatic flare.  Also provided with referral information for general surgery for gallbladder/cholelithiasis and gastroenterology.  Patient agreeable with this plan and was discharged home with his wife.                             Orders placed during this visit:  Orders Placed This Encounter   Procedures    CT Abdomen Pelvis With Contrast    [place] Draw    Comprehensive Metabolic Panel    Lipase    Single High Sensitivity Troponin T    Urinalysis With Microscopic If Indicated (No Culture) - Urine, Clean Catch    CBC Auto Differential    Ambulatory Referral to Gastroenterology    Ambulatory Referral to General Surgery    NPO Diet NPO Type: Strict NPO    Undress & Gown    Continuous Pulse Oximetry    Vital Signs    Oxygen Therapy- Nasal Cannula; Titrate 1-6 LPM Per SpO2; 90 - 95%    ECG 12 Lead ED Triage Standing Order; Abdominal Pain  (Upper)    Insert Peripheral IV    CBC & Differential    Green Top (Gel)    Lavender Top    Gold Top - SST    Light Blue Top         ED Course:    Consultants:                  Shared Decision Making:  After my consideration of clinical presentation and any laboratory/radiology studies obtained, I discussed the findings with the patient/patient representative who is in agreement with the treatment plan and the final disposition.   Risks and benefits of discharge and/or observation/admission were discussed.      AS OF 03:24 EDT VITALS:    BP - 128/62  HR - 71  TEMP - 97.9 °F (36.6 °C)  O2 SATS - 94%                  DIAGNOSIS  Final diagnoses:   Right upper quadrant pain   Calculus of gallbladder without cholecystitis without obstruction   Pancreatic pseudocyst   Other chronic pancreatitis         DISPOSITION  Discharge      Please note that portions of this document were completed with voice recognition software.        Yoni Cobos MD  04/23/24 0328

## 2024-04-26 NOTE — H&P (VIEW-ONLY)
Patient: Ciera Gibson    YOB: 1985    Date: 04/30/2024    Primary Care Provider: Tyra Bro APRN    Chief Complaint   Patient presents with    Cholelithiasis       SUBJECTIVE:    History of present illness:  I saw the patient in the office today as a consultation for evaluation and treatment of cholelithiasis and right upper abdominal pain. CT scan was performed on 4/23/24 which showed pancreatitis with small fluid collection along the superior margin of the pancreatic tail suggestive of a developing pseudocyst. Cholelithiasis. Patient was hospitalized at the beginning of April with pancreatitis likely related to hypertriglyceridemia >3000. He states that he has changed his diet and it has helped with symptoms, however, he was seen back in the ED 4/23/24 with right upper quadrant pain and some nausea likely biliary colic. He states that the day prior he had eaten at a [place] restaurant and had fajitas and guacamole.     The following portions of the patient's history were reviewed and updated as appropriate: allergies, current medications, past family history, past medical history, past social history, past surgical history and problem list.    Review of Systems   Constitutional:  Negative for chills, fever and unexpected weight change.   HENT:  Negative for trouble swallowing and voice change.    Eyes:  Negative for visual disturbance.   Respiratory:  Negative for apnea, cough, chest tightness, shortness of breath and wheezing.    Cardiovascular:  Negative for chest pain, palpitations and leg swelling.   Gastrointestinal:  Negative for abdominal distention, abdominal pain, anal bleeding, blood in stool, constipation, diarrhea, nausea, rectal pain and vomiting.   Endocrine: Negative for cold intolerance and heat intolerance.   Genitourinary:  Negative for difficulty urinating, dysuria, flank pain, scrotal swelling and testicular pain.   Musculoskeletal:  Negative for back pain, gait problem and  joint swelling.   Skin:  Negative for color change, rash and wound.   Neurological:  Negative for dizziness, syncope, speech difficulty, weakness, numbness and headaches.   Hematological:  Negative for adenopathy. Does not bruise/bleed easily.   Psychiatric/Behavioral:  Negative for confusion. The patient is not nervous/anxious.        History:  Past Medical History:   Diagnosis Date    Acute pancreatitis     Psoriatic arthritis        Past Surgical History:   Procedure Laterality Date    ADENOIDECTOMY      BLADDER SURGERY      COLONOSCOPY      HAND SURGERY Left     thumb repair    TONSILLECTOMY         Family History   Problem Relation Age of Onset    Stroke Mother     Heart disease Father        Social History     Tobacco Use    Smoking status: Former     Current packs/day: 1.00     Types: Cigarettes     Passive exposure: Past    Smokeless tobacco: Never   Vaping Use    Vaping status: Never Used   Substance Use Topics    Alcohol use: Not Currently    Drug use: Never       Allergies:  No Known Allergies    Medications:    Current Outpatient Medications:     atorvastatin (LIPITOR) 80 MG tablet, Take 1 tablet by mouth Every Night, Disp: 30 tablet, Rfl: 2    docusate sodium (COLACE) 100 MG capsule, Take 1 capsule by mouth 2 (Two) Times a Day., Disp: , Rfl:     Enbrel SureClick 50 MG/ML solution auto-injector, 50 mg by Subcutaneous Infusion route 1 (One) Time Per Week. TAKES ON SUNDAYS, Disp: , Rfl:     fenofibrate (TRICOR) 145 MG tablet, Take 1 tablet by mouth Daily, Disp: 30 tablet, Rfl: 0    HYDROcodone-acetaminophen (NORCO) 5-325 MG per tablet, Take 1 tablet by mouth Every 6 (Six) Hours As Needed for Moderate Pain or Severe Pain., Disp: 10 tablet, Rfl: 0    meloxicam (MOBIC) 15 MG tablet, Take 1 tablet by mouth Daily. (Patient not taking: Reported on 4/30/2024), Disp: 7 tablet, Rfl: 0    OBJECTIVE:    Vital Signs:   Vitals:    04/30/24 0850   BP: 118/64   Pulse: 84   Resp: 18   Temp: 97.7 °F (36.5 °C)   TempSrc:  "Temporal   SpO2: 100%   Weight: 106 kg (233 lb 3.2 oz)   Height: 175.3 cm (69\")       Physical Exam:   General Appearance:    Alert, cooperative, in no acute distress   Head:    Normocephalic, without obvious abnormality, atraumatic   Eyes:            Lids and lashes normal, conjunctivae and sclerae normal, no   icterus, no pallor, corneas clear, PERRLA   Ears:    Ears appear intact with no abnormalities noted   Throat:   No oral lesions, no thrush, oral mucosa moist   Neck:   No adenopathy, supple, trachea midline, no thyromegaly, no   carotid bruit, no JVD   Lungs:     Clear to auscultation,respirations regular, even and                  unlabored    Heart:    Regular rhythm and normal rate, normal S1 and S2, no            murmur   Abdomen:     no masses, no organomegaly, soft non-tender, non-distended, no guarding, previous incisions secondary to robotic cystectomy for bladder tumor   Extremities:   Moves all extremities well, no edema, no cyanosis, no             redness   Pulses:   Pulses palpable and equal bilaterally   Skin:   No bleeding, bruising or rash   Lymph nodes:   No palpable adenopathy   Neurologic:   Cranial nerves 2 - 12 grossly intact, sensation intact      Results Review:   I reviewed the patient's new clinical results.  I reviewed the patient's new imaging results and agree with the interpretation.    Review of Systems was reviewed and confirmed as accurate as documented by the MA.    ASSESSMENT/PLAN:    1. Symptomatic cholelithiasis    2. History of pancreatitis    3. Hypertriglyceridemia        I had a detailed and extensive discussion with the patient in the office and they understand that they need to undergo robotic assisted laparoscopic cholecystectomy with ICG cholangiography or possible open cholecystectomy. Full risks and benefits of operative versus nonoperative intervention were discussed with the patient and these included things such as nonresolution of symptoms and possible " worsening of symptoms without surgical intervention versus infection, bleeding, open cholecystectomy, common bile duct injury, postoperative biliary leakage, need for drain placement, possible inability to perform cholangiography due to inflammation, postoperative abscess, etc with surgical intervention. The patient understands, agrees, and wishes to proceed with the surgical treatment plan as mentioned above. The patient had no questions for me at the end of the discussion.      I discussed the patients findings and my recommendations with patient.    Electronically signed by Terri Armstrong DO  04/30/24

## 2024-04-26 NOTE — PROGRESS NOTES
Patient: Ciera Gibson    YOB: 1985    Date: 04/30/2024    Primary Care Provider: Tyra Bro APRN    Chief Complaint   Patient presents with    Cholelithiasis       SUBJECTIVE:    History of present illness:  I saw the patient in the office today as a consultation for evaluation and treatment of cholelithiasis and right upper abdominal pain. CT scan was performed on 4/23/24 which showed pancreatitis with small fluid collection along the superior margin of the pancreatic tail suggestive of a developing pseudocyst. Cholelithiasis. Patient was hospitalized at the beginning of April with pancreatitis likely related to hypertriglyceridemia >3000. He states that he has changed his diet and it has helped with symptoms, however, he was seen back in the ED 4/23/24 with right upper quadrant pain and some nausea likely biliary colic. He states that the day prior he had eaten at a [place] restaurant and had fajitas and guacamole.     The following portions of the patient's history were reviewed and updated as appropriate: allergies, current medications, past family history, past medical history, past social history, past surgical history and problem list.    Review of Systems   Constitutional:  Negative for chills, fever and unexpected weight change.   HENT:  Negative for trouble swallowing and voice change.    Eyes:  Negative for visual disturbance.   Respiratory:  Negative for apnea, cough, chest tightness, shortness of breath and wheezing.    Cardiovascular:  Negative for chest pain, palpitations and leg swelling.   Gastrointestinal:  Negative for abdominal distention, abdominal pain, anal bleeding, blood in stool, constipation, diarrhea, nausea, rectal pain and vomiting.   Endocrine: Negative for cold intolerance and heat intolerance.   Genitourinary:  Negative for difficulty urinating, dysuria, flank pain, scrotal swelling and testicular pain.   Musculoskeletal:  Negative for back pain, gait problem and  joint swelling.   Skin:  Negative for color change, rash and wound.   Neurological:  Negative for dizziness, syncope, speech difficulty, weakness, numbness and headaches.   Hematological:  Negative for adenopathy. Does not bruise/bleed easily.   Psychiatric/Behavioral:  Negative for confusion. The patient is not nervous/anxious.        History:  Past Medical History:   Diagnosis Date    Acute pancreatitis     Psoriatic arthritis        Past Surgical History:   Procedure Laterality Date    ADENOIDECTOMY      BLADDER SURGERY      COLONOSCOPY      HAND SURGERY Left     thumb repair    TONSILLECTOMY         Family History   Problem Relation Age of Onset    Stroke Mother     Heart disease Father        Social History     Tobacco Use    Smoking status: Former     Current packs/day: 1.00     Types: Cigarettes     Passive exposure: Past    Smokeless tobacco: Never   Vaping Use    Vaping status: Never Used   Substance Use Topics    Alcohol use: Not Currently    Drug use: Never       Allergies:  No Known Allergies    Medications:    Current Outpatient Medications:     atorvastatin (LIPITOR) 80 MG tablet, Take 1 tablet by mouth Every Night, Disp: 30 tablet, Rfl: 2    docusate sodium (COLACE) 100 MG capsule, Take 1 capsule by mouth 2 (Two) Times a Day., Disp: , Rfl:     Enbrel SureClick 50 MG/ML solution auto-injector, 50 mg by Subcutaneous Infusion route 1 (One) Time Per Week. TAKES ON SUNDAYS, Disp: , Rfl:     fenofibrate (TRICOR) 145 MG tablet, Take 1 tablet by mouth Daily, Disp: 30 tablet, Rfl: 0    HYDROcodone-acetaminophen (NORCO) 5-325 MG per tablet, Take 1 tablet by mouth Every 6 (Six) Hours As Needed for Moderate Pain or Severe Pain., Disp: 10 tablet, Rfl: 0    meloxicam (MOBIC) 15 MG tablet, Take 1 tablet by mouth Daily. (Patient not taking: Reported on 4/30/2024), Disp: 7 tablet, Rfl: 0    OBJECTIVE:    Vital Signs:   Vitals:    04/30/24 0850   BP: 118/64   Pulse: 84   Resp: 18   Temp: 97.7 °F (36.5 °C)   TempSrc:  "Temporal   SpO2: 100%   Weight: 106 kg (233 lb 3.2 oz)   Height: 175.3 cm (69\")       Physical Exam:   General Appearance:    Alert, cooperative, in no acute distress   Head:    Normocephalic, without obvious abnormality, atraumatic   Eyes:            Lids and lashes normal, conjunctivae and sclerae normal, no   icterus, no pallor, corneas clear, PERRLA   Ears:    Ears appear intact with no abnormalities noted   Throat:   No oral lesions, no thrush, oral mucosa moist   Neck:   No adenopathy, supple, trachea midline, no thyromegaly, no   carotid bruit, no JVD   Lungs:     Clear to auscultation,respirations regular, even and                  unlabored    Heart:    Regular rhythm and normal rate, normal S1 and S2, no            murmur   Abdomen:     no masses, no organomegaly, soft non-tender, non-distended, no guarding, previous incisions secondary to robotic cystectomy for bladder tumor   Extremities:   Moves all extremities well, no edema, no cyanosis, no             redness   Pulses:   Pulses palpable and equal bilaterally   Skin:   No bleeding, bruising or rash   Lymph nodes:   No palpable adenopathy   Neurologic:   Cranial nerves 2 - 12 grossly intact, sensation intact      Results Review:   I reviewed the patient's new clinical results.  I reviewed the patient's new imaging results and agree with the interpretation.    Review of Systems was reviewed and confirmed as accurate as documented by the MA.    ASSESSMENT/PLAN:    1. Symptomatic cholelithiasis    2. History of pancreatitis    3. Hypertriglyceridemia        I had a detailed and extensive discussion with the patient in the office and they understand that they need to undergo robotic assisted laparoscopic cholecystectomy with ICG cholangiography or possible open cholecystectomy. Full risks and benefits of operative versus nonoperative intervention were discussed with the patient and these included things such as nonresolution of symptoms and possible " worsening of symptoms without surgical intervention versus infection, bleeding, open cholecystectomy, common bile duct injury, postoperative biliary leakage, need for drain placement, possible inability to perform cholangiography due to inflammation, postoperative abscess, etc with surgical intervention. The patient understands, agrees, and wishes to proceed with the surgical treatment plan as mentioned above. The patient had no questions for me at the end of the discussion.      I discussed the patients findings and my recommendations with patient.    Electronically signed by Terri Armstrong DO  04/30/24

## 2024-04-30 ENCOUNTER — OFFICE VISIT (OUTPATIENT)
Dept: SURGERY | Facility: CLINIC | Age: 39
End: 2024-04-30
Payer: COMMERCIAL

## 2024-04-30 VITALS
RESPIRATION RATE: 18 BRPM | BODY MASS INDEX: 34.54 KG/M2 | OXYGEN SATURATION: 100 % | DIASTOLIC BLOOD PRESSURE: 64 MMHG | HEIGHT: 69 IN | WEIGHT: 233.2 LBS | SYSTOLIC BLOOD PRESSURE: 118 MMHG | TEMPERATURE: 97.7 F | HEART RATE: 84 BPM

## 2024-04-30 DIAGNOSIS — K80.20 SYMPTOMATIC CHOLELITHIASIS: Primary | ICD-10-CM

## 2024-04-30 DIAGNOSIS — E78.1 HYPERTRIGLYCERIDEMIA: ICD-10-CM

## 2024-04-30 DIAGNOSIS — Z87.19 HISTORY OF PANCREATITIS: ICD-10-CM

## 2024-04-30 PROCEDURE — 99204 OFFICE O/P NEW MOD 45 MIN: CPT | Performed by: STUDENT IN AN ORGANIZED HEALTH CARE EDUCATION/TRAINING PROGRAM

## 2024-04-30 RX ORDER — INDOCYANINE GREEN AND WATER 25 MG
2.5 KIT INJECTION ONCE
OUTPATIENT
Start: 2024-04-30 | End: 2024-04-30

## 2024-04-30 RX ORDER — SODIUM CHLORIDE 9 MG/ML
40 INJECTION, SOLUTION INTRAVENOUS AS NEEDED
OUTPATIENT
Start: 2024-04-30

## 2024-04-30 RX ORDER — SODIUM CHLORIDE, SODIUM LACTATE, POTASSIUM CHLORIDE, CALCIUM CHLORIDE 600; 310; 30; 20 MG/100ML; MG/100ML; MG/100ML; MG/100ML
50 INJECTION, SOLUTION INTRAVENOUS CONTINUOUS
OUTPATIENT
Start: 2024-04-30

## 2024-04-30 RX ORDER — HEPARIN SODIUM 5000 [USP'U]/ML
5000 INJECTION, SOLUTION INTRAVENOUS; SUBCUTANEOUS ONCE
OUTPATIENT
Start: 2024-04-30 | End: 2024-04-30

## 2024-04-30 RX ORDER — SODIUM CHLORIDE 0.9 % (FLUSH) 0.9 %
10 SYRINGE (ML) INJECTION EVERY 12 HOURS SCHEDULED
OUTPATIENT
Start: 2024-04-30

## 2024-04-30 RX ORDER — DOCUSATE SODIUM 100 MG/1
100 CAPSULE, LIQUID FILLED ORAL 2 TIMES DAILY
COMMUNITY

## 2024-04-30 RX ORDER — SODIUM CHLORIDE 0.9 % (FLUSH) 0.9 %
10 SYRINGE (ML) INJECTION AS NEEDED
OUTPATIENT
Start: 2024-04-30

## 2024-04-30 NOTE — TELEPHONE ENCOUNTER
Patient's brother was just diagnosed with Pertussis again and per Infectious Disease recommendation, the whole family should be retreated.  Just had a round of Z-pack about a month ago for this.  Confirmed previous prescription with Justus, pharmacist at [facility].  Tomeka Khalil CMA([facility])..................8/1/2018   4:11 PM    Rx Refill Note  Requested Prescriptions     Pending Prescriptions Disp Refills    fenofibrate (TRICOR) 145 MG tablet 90 tablet 3     Sig: Take 1 tablet by mouth Daily    atorvastatin (LIPITOR) 80 MG tablet 90 tablet 3     Sig: Take 1 tablet by mouth Every Night      Last office visit with prescribing clinician: 4/9/2024   Last telemedicine visit with prescribing clinician: Visit date not found   Next office visit with prescribing clinician: 7/16/2024                         Liz Schofield MA  04/30/24, 13:36 EDT

## 2024-04-30 NOTE — TELEPHONE ENCOUNTER
Caller: Ciera Gibson    Relationship: Self    Best call back number: [phone]     Requested Prescriptions:   Requested Prescriptions     Pending Prescriptions Disp Refills    fenofibrate (TRICOR) 145 MG tablet 30 tablet 0     Sig: Take 1 tablet by mouth Daily    atorvastatin (LIPITOR) 80 MG tablet 30 tablet 2     Sig: Take 1 tablet by mouth Every Night        Pharmacy where request should be sent: [id number] [place], [place] - [address] [facility] - [phone] [facility] [phone]      Last office visit with prescribing clinician: 4/9/2024   Last telemedicine visit with prescribing clinician: Visit date not found   Next office visit with prescribing clinician: 7/16/2024     Additional details provided by patient: 3 PILLS LEFT    Does the patient have less than a 3 day supply:  [] Yes  [x] No    Would you like a call back once the refill request has been completed: [] Yes [x] No    If the office needs to give you a call back, can they leave a voicemail: [] Yes [x] No    Mariann Hensley   04/30/24 13:21 EDT

## 2024-05-02 RX ORDER — FENOFIBRATE 145 MG/1
145 TABLET, COATED ORAL DAILY
Qty: 90 TABLET | Refills: 3 | Status: SHIPPED | OUTPATIENT
Start: 2024-05-02

## 2024-05-02 RX ORDER — ATORVASTATIN CALCIUM 80 MG/1
80 TABLET, FILM COATED ORAL NIGHTLY
Qty: 90 TABLET | Refills: 3 | Status: SHIPPED | OUTPATIENT
Start: 2024-05-02

## 2024-05-02 NOTE — TELEPHONE ENCOUNTER
Rx Refill Note  Requested Prescriptions     Pending Prescriptions Disp Refills    fenofibrate (TRICOR) 145 MG tablet 90 tablet 3     Sig: Take 1 tablet by mouth Daily    atorvastatin (LIPITOR) 80 MG tablet 90 tablet 3     Sig: Take 1 tablet by mouth Every Night      Last office visit with prescribing clinician: 4/9/2024   Last telemedicine visit with prescribing clinician: Visit date not found   Next office visit with prescribing clinician: 7/16/2024       Emilia Arriaza MA  05/02/24, 15:29 EDT

## 2024-05-09 ENCOUNTER — TELEPHONE (OUTPATIENT)
Dept: PREADMISSION TESTING | Facility: HOSPITAL | Age: 39
End: 2024-05-09

## 2024-05-10 ENCOUNTER — TELEPHONE (OUTPATIENT)
Dept: INTERNAL MEDICINE | Facility: CLINIC | Age: 39
End: 2024-05-10
Payer: COMMERCIAL

## 2024-05-10 NOTE — TELEPHONE ENCOUNTER
Caller: Ciera Gibson    Relationship: Self    Best call back number: [phone]     What is the best time to reach you: ANY    Who are you requesting to speak with (clinical staff, provider,  specific staff member): CLINICAL     What was the call regarding: PATIENT STATED THAT HE HAS BEEN TO THE ER DUE TO HAVING A GALLBLADDER SPASM. HE IS SCHEDULED TO HAVE SURGERY FOR  REMOVAL OF GALLBLADDER ON 5/22/24 DR. HILL IN [facility].     PATIENT WOULD LIKE TO HAVE A CALL BACK TO DISCUSS ANY STEPS THAT HE SHOULD TAKE LEADING UP TO THIS.

## 2024-05-21 ENCOUNTER — ANESTHESIA EVENT (OUTPATIENT)
Dept: PERIOP | Facility: HOSPITAL | Age: 39
End: 2024-05-21
Payer: COMMERCIAL

## 2024-05-21 NOTE — ANESTHESIA PREPROCEDURE EVALUATION
Anesthesia Evaluation     Patient summary reviewed and Nursing notes reviewed   no history of anesthetic complications:   NPO Solid Status: > 8 hours  NPO Liquid Status: > 8 hours           Airway   Mallampati: II  TM distance: >3 FB  Neck ROM: full  Possible difficult intubation and Large neck circumference  Dental - normal exam     Pulmonary    (+) COPD,shortness of breath, sleep apnea, decreased breath sounds  Cardiovascular     Rhythm: regular  Rate: normal    (+) CHRISTIANSON, PVD, hyperlipidemia      Neuro/Psych  GI/Hepatic/Renal/Endo    (+) obesity, liver disease fatty liver disease, renal disease- CRI, diabetes mellitus (inc gluc levels untreated) type 2 poorly controlled    Musculoskeletal     (+) arthralgias, back pain, chronic pain, myalgias  Abdominal   (+) obese   Substance History   (+) alcohol use  (-) drug use     OB/GYN          Other   arthritis, blood dyscrasia anemia,     ROS/Med Hx Other: Labs reviewed   4/24 EKG sr 1st degree avb sa                Anesthesia Plan    ASA 3     general     (Risks and benefits discussed including risk of aspiration, recall and dental damage. All patient questions answered.    Will continue with plan of care.)  intravenous induction     Anesthetic plan, risks, benefits, and alternatives have been provided, discussed and informed consent has been obtained with: patient.  Pre-procedure education provided    CODE STATUS:

## 2024-05-22 ENCOUNTER — ANESTHESIA (OUTPATIENT)
Dept: PERIOP | Facility: HOSPITAL | Age: 39
End: 2024-05-22
Payer: COMMERCIAL

## 2024-05-22 ENCOUNTER — HOSPITAL ENCOUNTER (OUTPATIENT)
Facility: HOSPITAL | Age: 39
Setting detail: HOSPITAL OUTPATIENT SURGERY
Discharge: HOME | End: 2024-05-22
Attending: STUDENT IN AN ORGANIZED HEALTH CARE EDUCATION/TRAINING PROGRAM | Admitting: STUDENT IN AN ORGANIZED HEALTH CARE EDUCATION/TRAINING PROGRAM
Payer: COMMERCIAL

## 2024-05-22 VITALS
DIASTOLIC BLOOD PRESSURE: 66 MMHG | OXYGEN SATURATION: 96 % | TEMPERATURE: 97.2 F | SYSTOLIC BLOOD PRESSURE: 118 MMHG | RESPIRATION RATE: 18 BRPM | HEART RATE: 64 BPM

## 2024-05-22 DIAGNOSIS — K80.20 SYMPTOMATIC CHOLELITHIASIS: ICD-10-CM

## 2024-05-22 PROCEDURE — 25010000002 DEXAMETHASONE PER 1 MG: Performed by: NURSE ANESTHETIST, CERTIFIED REGISTERED

## 2024-05-22 PROCEDURE — 25810000003 LACTATED RINGERS PER 1000 ML: Performed by: NURSE ANESTHETIST, CERTIFIED REGISTERED

## 2024-05-22 PROCEDURE — 25010000002 FENTANYL CITRATE (PF) 50 MCG/ML SOLUTION: Performed by: NURSE ANESTHETIST, CERTIFIED REGISTERED

## 2024-05-22 PROCEDURE — 25010000002 BUPIVACAINE (PF) 0.25 % SOLUTION: Performed by: STUDENT IN AN ORGANIZED HEALTH CARE EDUCATION/TRAINING PROGRAM

## 2024-05-22 PROCEDURE — 25010000002 ONDANSETRON PER 1 MG: Performed by: NURSE ANESTHETIST, CERTIFIED REGISTERED

## 2024-05-22 PROCEDURE — 47562 LAPAROSCOPIC CHOLECYSTECTOMY: CPT | Performed by: STUDENT IN AN ORGANIZED HEALTH CARE EDUCATION/TRAINING PROGRAM

## 2024-05-22 PROCEDURE — 25010000002 CEFAZOLIN PER 500 MG: Performed by: STUDENT IN AN ORGANIZED HEALTH CARE EDUCATION/TRAINING PROGRAM

## 2024-05-22 PROCEDURE — 25010000002 MIDAZOLAM PER 1MG: Performed by: NURSE ANESTHETIST, CERTIFIED REGISTERED

## 2024-05-22 PROCEDURE — 25010000002 INDOCYANINE GREEN 25 MG RECONSTITUTED SOLUTION: Performed by: STUDENT IN AN ORGANIZED HEALTH CARE EDUCATION/TRAINING PROGRAM

## 2024-05-22 PROCEDURE — 25010000002 HEPARIN (PORCINE) PER 1000 UNITS: Performed by: STUDENT IN AN ORGANIZED HEALTH CARE EDUCATION/TRAINING PROGRAM

## 2024-05-22 PROCEDURE — 25010000002 LIDOCAINE 1 % SOLUTION: Performed by: STUDENT IN AN ORGANIZED HEALTH CARE EDUCATION/TRAINING PROGRAM

## 2024-05-22 PROCEDURE — 25010000002 PROPOFOL 10 MG/ML EMULSION: Performed by: NURSE ANESTHETIST, CERTIFIED REGISTERED

## 2024-05-22 PROCEDURE — 25010000002 SUGAMMADEX 200 MG/2ML SOLUTION: Performed by: NURSE ANESTHETIST, CERTIFIED REGISTERED

## 2024-05-22 DEVICE — CLIP LIG HEMOLOK PA LG 6CT PRP: Type: IMPLANTABLE DEVICE | Site: ABDOMEN | Status: FUNCTIONAL

## 2024-05-22 RX ORDER — SODIUM CHLORIDE 0.9 % (FLUSH) 0.9 %
10 SYRINGE (ML) INJECTION EVERY 12 HOURS SCHEDULED
Status: DISCONTINUED | OUTPATIENT
Start: 2024-05-22 | End: 2024-05-22 | Stop reason: HOSPADM

## 2024-05-22 RX ORDER — LORAZEPAM 2 MG/ML
1 INJECTION INTRAMUSCULAR ONCE
Status: DISCONTINUED | OUTPATIENT
Start: 2024-05-22 | End: 2024-05-22 | Stop reason: HOSPADM

## 2024-05-22 RX ORDER — INDOCYANINE GREEN AND WATER 25 MG
2.5 KIT INJECTION ONCE
Status: COMPLETED | OUTPATIENT
Start: 2024-05-22 | End: 2024-05-22

## 2024-05-22 RX ORDER — HEPARIN SODIUM 5000 [USP'U]/ML
5000 INJECTION, SOLUTION INTRAVENOUS; SUBCUTANEOUS ONCE
Status: COMPLETED | OUTPATIENT
Start: 2024-05-22 | End: 2024-05-22

## 2024-05-22 RX ORDER — SUCCINYLCHOLINE/SOD CL,ISO/PF 200MG/10ML
SYRINGE (ML) INTRAVENOUS AS NEEDED
Status: DISCONTINUED | OUTPATIENT
Start: 2024-05-22 | End: 2024-05-22 | Stop reason: SURG

## 2024-05-22 RX ORDER — SODIUM CHLORIDE 0.9 % (FLUSH) 0.9 %
10 SYRINGE (ML) INJECTION AS NEEDED
Status: DISCONTINUED | OUTPATIENT
Start: 2024-05-22 | End: 2024-05-22 | Stop reason: HOSPADM

## 2024-05-22 RX ORDER — SODIUM CHLORIDE, SODIUM LACTATE, POTASSIUM CHLORIDE, CALCIUM CHLORIDE 600; 310; 30; 20 MG/100ML; MG/100ML; MG/100ML; MG/100ML
50 INJECTION, SOLUTION INTRAVENOUS CONTINUOUS
Status: DISCONTINUED | OUTPATIENT
Start: 2024-05-22 | End: 2024-05-22 | Stop reason: HOSPADM

## 2024-05-22 RX ORDER — ONDANSETRON 2 MG/ML
4 INJECTION INTRAMUSCULAR; INTRAVENOUS ONCE AS NEEDED
Status: DISCONTINUED | OUTPATIENT
Start: 2024-05-22 | End: 2024-05-22 | Stop reason: HOSPADM

## 2024-05-22 RX ORDER — SODIUM CHLORIDE, SODIUM LACTATE, POTASSIUM CHLORIDE, CALCIUM CHLORIDE 600; 310; 30; 20 MG/100ML; MG/100ML; MG/100ML; MG/100ML
INJECTION, SOLUTION INTRAVENOUS CONTINUOUS PRN
Status: DISCONTINUED | OUTPATIENT
Start: 2024-05-22 | End: 2024-05-22 | Stop reason: SURG

## 2024-05-22 RX ORDER — MIDAZOLAM HYDROCHLORIDE 2 MG/2ML
INJECTION, SOLUTION INTRAMUSCULAR; INTRAVENOUS AS NEEDED
Status: DISCONTINUED | OUTPATIENT
Start: 2024-05-22 | End: 2024-05-22 | Stop reason: SURG

## 2024-05-22 RX ORDER — BUPIVACAINE HYDROCHLORIDE 2.5 MG/ML
INJECTION, SOLUTION EPIDURAL; INFILTRATION; INTRACAUDAL AS NEEDED
Status: DISCONTINUED | OUTPATIENT
Start: 2024-05-22 | End: 2024-05-22 | Stop reason: HOSPADM

## 2024-05-22 RX ORDER — ONDANSETRON 2 MG/ML
INJECTION INTRAMUSCULAR; INTRAVENOUS AS NEEDED
Status: DISCONTINUED | OUTPATIENT
Start: 2024-05-22 | End: 2024-05-22 | Stop reason: SURG

## 2024-05-22 RX ORDER — ONDANSETRON 2 MG/ML
4 INJECTION INTRAMUSCULAR; INTRAVENOUS ONCE
Status: DISCONTINUED | OUTPATIENT
Start: 2024-05-22 | End: 2024-05-22 | Stop reason: HOSPADM

## 2024-05-22 RX ORDER — HYDROCODONE BITARTRATE AND ACETAMINOPHEN 5; 325 MG/1; MG/1
1-2 TABLET ORAL EVERY 4 HOURS PRN
Qty: 10 TABLET | Refills: 0 | Status: SHIPPED | OUTPATIENT
Start: 2024-05-22

## 2024-05-22 RX ORDER — SODIUM CHLORIDE 9 MG/ML
40 INJECTION, SOLUTION INTRAVENOUS AS NEEDED
Status: DISCONTINUED | OUTPATIENT
Start: 2024-05-22 | End: 2024-05-22 | Stop reason: HOSPADM

## 2024-05-22 RX ORDER — DEXAMETHASONE SODIUM PHOSPHATE 4 MG/ML
INJECTION, SOLUTION INTRA-ARTICULAR; INTRALESIONAL; INTRAMUSCULAR; INTRAVENOUS; SOFT TISSUE AS NEEDED
Status: DISCONTINUED | OUTPATIENT
Start: 2024-05-22 | End: 2024-05-22 | Stop reason: SURG

## 2024-05-22 RX ORDER — LIDOCAINE HYDROCHLORIDE 10 MG/ML
INJECTION, SOLUTION INFILTRATION; PERINEURAL AS NEEDED
Status: DISCONTINUED | OUTPATIENT
Start: 2024-05-22 | End: 2024-05-22 | Stop reason: HOSPADM

## 2024-05-22 RX ORDER — IPRATROPIUM BROMIDE AND ALBUTEROL SULFATE 2.5; .5 MG/3ML; MG/3ML
3 SOLUTION RESPIRATORY (INHALATION) ONCE
Status: DISCONTINUED | OUTPATIENT
Start: 2024-05-22 | End: 2024-05-22 | Stop reason: HOSPADM

## 2024-05-22 RX ORDER — EPHEDRINE SULFATE 5 MG/ML
INJECTION INTRAVENOUS AS NEEDED
Status: DISCONTINUED | OUTPATIENT
Start: 2024-05-22 | End: 2024-05-22 | Stop reason: SURG

## 2024-05-22 RX ORDER — FENTANYL CITRATE 50 UG/ML
INJECTION, SOLUTION INTRAMUSCULAR; INTRAVENOUS AS NEEDED
Status: DISCONTINUED | OUTPATIENT
Start: 2024-05-22 | End: 2024-05-22 | Stop reason: SURG

## 2024-05-22 RX ORDER — ROCURONIUM BROMIDE 50 MG/5 ML
SYRINGE (ML) INTRAVENOUS AS NEEDED
Status: DISCONTINUED | OUTPATIENT
Start: 2024-05-22 | End: 2024-05-22 | Stop reason: SURG

## 2024-05-22 RX ORDER — PROPOFOL 10 MG/ML
VIAL (ML) INTRAVENOUS AS NEEDED
Status: DISCONTINUED | OUTPATIENT
Start: 2024-05-22 | End: 2024-05-22 | Stop reason: SURG

## 2024-05-22 RX ADMIN — SUGAMMADEX 200 MG: 100 INJECTION, SOLUTION INTRAVENOUS at 15:32

## 2024-05-22 RX ADMIN — Medication 200 MG: at 14:37

## 2024-05-22 RX ADMIN — SODIUM CHLORIDE, POTASSIUM CHLORIDE, SODIUM LACTATE AND CALCIUM CHLORIDE: 600; 310; 30; 20 INJECTION, SOLUTION INTRAVENOUS at 12:25

## 2024-05-22 RX ADMIN — EPHEDRINE SULFATE 15 MG: 5 INJECTION INTRAVENOUS at 15:00

## 2024-05-22 RX ADMIN — SODIUM CHLORIDE, POTASSIUM CHLORIDE, SODIUM LACTATE AND CALCIUM CHLORIDE 1000 ML: 600; 310; 30; 20 INJECTION, SOLUTION INTRAVENOUS at 12:57

## 2024-05-22 RX ADMIN — MIDAZOLAM HYDROCHLORIDE 2 MG: 1 INJECTION, SOLUTION INTRAMUSCULAR; INTRAVENOUS at 14:29

## 2024-05-22 RX ADMIN — SODIUM CHLORIDE 2000 MG: 9 INJECTION, SOLUTION INTRAVENOUS at 14:40

## 2024-05-22 RX ADMIN — INDOCYANINE GREEN AND WATER 2.5 MG: KIT at 12:59

## 2024-05-22 RX ADMIN — ONDANSETRON 4 MG: 2 INJECTION INTRAMUSCULAR; INTRAVENOUS at 15:26

## 2024-05-22 RX ADMIN — Medication 5 MG: at 14:37

## 2024-05-22 RX ADMIN — PROPOFOL 200 MG: 10 INJECTION, EMULSION INTRAVENOUS at 14:37

## 2024-05-22 RX ADMIN — SODIUM CHLORIDE, POTASSIUM CHLORIDE, SODIUM LACTATE AND CALCIUM CHLORIDE: 600; 310; 30; 20 INJECTION, SOLUTION INTRAVENOUS at 14:58

## 2024-05-22 RX ADMIN — LIDOCAINE HYDROCHLORIDE 100 MG: 20 INJECTION, SOLUTION INTRAVENOUS at 14:37

## 2024-05-22 RX ADMIN — DEXAMETHASONE SODIUM PHOSPHATE 8 MG: 4 INJECTION, SOLUTION INTRA-ARTICULAR; INTRALESIONAL; INTRAMUSCULAR; INTRAVENOUS; SOFT TISSUE at 14:37

## 2024-05-22 RX ADMIN — FENTANYL CITRATE 100 MCG: 50 INJECTION, SOLUTION INTRAMUSCULAR; INTRAVENOUS at 14:37

## 2024-05-22 RX ADMIN — Medication 25 MG: at 14:44

## 2024-05-22 RX ADMIN — HEPARIN SODIUM 5000 UNITS: 5000 INJECTION INTRAVENOUS; SUBCUTANEOUS at 13:05

## 2024-05-22 NOTE — ANESTHESIA PROCEDURE NOTES
Airway  Urgency: elective    Date/Time: 5/22/2024 2:38 PM  Airway not difficult    General Information and Staff    Patient location during procedure: OR  CRNA/CAA: Jamari Rodrigues CRNA    Indications and Patient Condition  Indications for airway management: airway protection    Preoxygenated: yes  Mask difficulty assessment: 1 - vent by mask    Final Airway Details  Final airway type: endotracheal airway      Successful airway: ETT  Cuffed: yes   Successful intubation technique: direct laryngoscopy  Endotracheal tube insertion site: oral  Blade: Mark  Blade size: 2  ETT size (mm): 7.5  Cormack-Lehane Classification: grade I - full view of glottis  Placement verified by: chest auscultation and capnometry   Measured from: teeth  ETT/EBT  to teeth (cm): 23  Number of attempts at approach: 1  Assessment: lips, teeth, and gum same as pre-op and atraumatic intubation

## 2024-05-22 NOTE — ANESTHESIA POSTPROCEDURE EVALUATION
Patient: Ciera Gibson    Procedure Summary       Date: 05/22/24 Room / Location: [facility] OR 2 /  [facility]    Anesthesia Start: 1426 Anesthesia Stop: 1548    Procedure: CHOLECYSTECTOMY LAPAROSCOPIC WITH DAVINCI ROBOT (Abdomen) Diagnosis:       Symptomatic cholelithiasis      (Symptomatic cholelithiasis [K80.20])    Surgeons: Terri Armstrong DO Provider: Jamari Rodrigues CRNA    Anesthesia Type: general ASA Status: 3            Anesthesia Type: general    Vitals  No vitals data found for the desired time range.          Post Anesthesia Care and Evaluation    Patient location during evaluation: PACU  Patient participation: complete - patient participated  Level of consciousness: awake and alert  Pain score: 2  Pain management: satisfactory to patient    Airway patency: patent  Anesthetic complications: No anesthetic complications  PONV Status: none  Cardiovascular status: acceptable and stable  Respiratory status: acceptable and spontaneous ventilation  Hydration status: acceptable    Comments: Vitals signs as noted in nursing documentation as per protocol.

## 2024-05-22 NOTE — OP NOTE
PATIENT:     Ciera Gibson    DATE OF SURGERY:     5/22/2024    PHYSICIAN:   Terri Armstrong DO    REFERRING PHYSICIAN:  Tyra Bro APRN    YOB: 1985    PREOPERATIVE DIAGNOSIS:  Acute and chronic cholecystitis due to cholelithiasis    POSTOPERATIVE DIAGNOSIS:  Acute and chronic cholecystitis due to cholelithiasis    PROCEDURE:  Robotic assisted laparoscopic cholecystectomy with ICG    EBL:  Less than 50 cc    COMPLICATIONS:  None    ANESTHESIA:  General endotracheal.    HISTORY:  The patient was sent to me as a consultation via Tyra Bro APRN for evaluation and treatment of acute and chronic right upper quadrant and mid epigastric abdominal discomfort.  Workup was begun and the patient was subsequently found to have acute and chronic cholecystitis due to cholelithiasis.  The patient is here now today for elective laparoscopic cholecystectomy for treatment of chronic cholecystitis.  The procedure was discussed with the patient preoperatively who understands, agrees, and wishes to proceed with the above-mentioned procedure in an elective outpatient fashion.      OPERATIVE PROCEDURE:  The patient was seen in the preoperative area. History and physical was reviewed, consent was verified, and all questions were answered. IV ICG was injected preoperatively. The patient was taken to the operating room, placed in the supine position, and given general endotracheal anesthesia.  The patient was prepped and draped in the normal sterile fashion, and also received preoperative IV antibiotics.  An appropriate timeout was performed by the nursing staff prior to the incision.    A periumbilical incision was made and deepened to the fascia. Utilizing a modified Wilfrido technique, the abdomen was entered under direct visualization. An 8mm robotic trocar was inserted and the abdomen was insufflated to a pressure of 15 mmHg. Patient tolerated insufflation well. A laparoscope was inserted and the abdomen  was inspected. No injury from initial port placement was identified. Additional ports were placed under direct visualization in the following fashion: two 8mm ports in the left upper abdomen and one 8 mm port in the upper right abdomen. The IFTTTi robot was draped in sterile fashion and was docked to the ports in standard fashion.     The fundus of the gallbladder was grasped and retracted toward the patient's right shoulder. The gallbladder was visualized completely and noted to be chronically inflamed appearing. The gallbladder infundibulum was grasped and retracted toward the right lower quadrant of the abdomen to expose the hilar structures. Dissection in the triangle of Calot was carried out until the critical view of safety was obtained, identifying the cystic duct and artery as the only two structures entering the gallbladder. ICG was used to visualize the biliary anatomy. Two hemoclips were placed proximally on the cystic duct and one distally. The duct was divided. The cystic artery was similarly clipped and divided. Bovie electrocautery was then used to remove the gallbladder from the liver bed.  It was then placed into a laparoscopic retrieval bag and removed via the periumbilical port.     The gallbladder fossa was inspected. Hemostasis was excellent and there was no evidence of biliary leakage. All trocar sites were injected with a local anesthetic mixture.  Trocars were removed under direct vision and the fascia was closed with 0-Vicryl suture and 4-0 Vicryl subcuticular stitch. The skin was dressed with surgical glue.    The patient was stable at this point and subsequently transferred back to the recovery room in stable condition.    Terri Armstrong DO

## 2024-05-24 LAB — REF LAB TEST METHOD: NORMAL

## 2024-05-29 NOTE — PROGRESS NOTES
"Patient: Ciera Gibson    YOB: 1985    Date: 06/03/2024    Primary Care Provider: Tyra Bro APRN    Chief Complaint   Patient presents with    Post-op Follow-up       History of present illness:  I saw the patient in the office today as a followup from their recent robotic assisted laparoscopic cholecystectomy with ICG 5/22/24. They state that they have done well and are having no complaints. He is tolerating a regular diet. Initially he did have some constipation, but this is improving. Denies diarrhea.    Vital Signs:   Vitals:    06/03/24 1250   BP: 118/88   Pulse: 72   Resp: 18   Temp: 97.8 °F (36.6 °C)   TempSrc: Temporal   SpO2: 99%   Weight: 105 kg (232 lb)   Height: 175.3 cm (69\")       Physical Exam:   General Appearance:  Well appearing, NAD   Abdomen:   Soft, non-tender, non-distended, incisions are clean, dry, and intact.     Assessment / Plan :    1. Status post laparoscopic cholecystectomy      Patient is doing well post cholecystectomy. He should continue to be careful with heavy lifting for 4-6 weeks after surgery. He may return to normal activity otherwise. He does not need to see me again unless he has questions or concerns. He may call back if he develops any of these. All questions were answered.    Electronically signed by Terri Armstrong DO  06/03/24                  " Impression: Dry eye syndrome of bilateral lacrimal glands: H04.123. Plan: Discussed diagnosis with patient. Recommend OTC artificial tears in OU for comfort, 3-4x's a day.

## 2024-06-03 ENCOUNTER — OFFICE VISIT (OUTPATIENT)
Dept: SURGERY | Facility: CLINIC | Age: 39
End: 2024-06-03
Payer: COMMERCIAL

## 2024-06-03 VITALS
DIASTOLIC BLOOD PRESSURE: 88 MMHG | OXYGEN SATURATION: 99 % | HEIGHT: 69 IN | TEMPERATURE: 97.8 F | HEART RATE: 72 BPM | WEIGHT: 232 LBS | RESPIRATION RATE: 18 BRPM | SYSTOLIC BLOOD PRESSURE: 118 MMHG | BODY MASS INDEX: 34.36 KG/M2

## 2024-06-03 DIAGNOSIS — Z90.49 STATUS POST LAPAROSCOPIC CHOLECYSTECTOMY: Primary | ICD-10-CM

## 2024-06-03 PROCEDURE — 99024 POSTOP FOLLOW-UP VISIT: CPT | Performed by: STUDENT IN AN ORGANIZED HEALTH CARE EDUCATION/TRAINING PROGRAM

## 2024-06-12 ENCOUNTER — TELEPHONE (OUTPATIENT)
Age: 39
End: 2024-06-12

## 2024-06-13 RX ORDER — MEDROXYPROGESTERONE ACETATE 150 MG/ML
1 INJECTION, SUSPENSION INTRAMUSCULAR WEEKLY
Qty: 4 EACH | Refills: 2 | Status: SHIPPED | OUTPATIENT
Start: 2024-06-13

## 2024-06-14 ENCOUNTER — OFFICE VISIT (OUTPATIENT)
Dept: GASTROENTEROLOGY | Facility: CLINIC | Age: 39
End: 2024-06-14
Payer: COMMERCIAL

## 2024-06-14 VITALS
HEART RATE: 69 BPM | WEIGHT: 234.6 LBS | DIASTOLIC BLOOD PRESSURE: 70 MMHG | OXYGEN SATURATION: 98 % | BODY MASS INDEX: 34.64 KG/M2 | SYSTOLIC BLOOD PRESSURE: 110 MMHG

## 2024-06-14 DIAGNOSIS — Z87.19 HISTORY OF PANCREATITIS: ICD-10-CM

## 2024-06-14 DIAGNOSIS — E78.2 MIXED HYPERLIPIDEMIA: ICD-10-CM

## 2024-06-14 DIAGNOSIS — E78.1 HYPERTRIGLYCERIDEMIA: ICD-10-CM

## 2024-06-14 NOTE — PROGRESS NOTES
New Patient Consult      Date: 2024   Patient Name: Ciera Gibson  MRN: [id number]  : 1985     Referring Physician: Yoni Cobos MD    Chief Complaint   Patient presents with    Pancreatitis       History of Present Illness: Ciera Gibson is a 39 y.o. male who is here today to establish care with Gastroenterology.     he presented to the emergency room with abdominal pain, and was found to have pancreatitis.  The pancreatitis was deemed secondary to severe hypertriglyceridemia treated at the hospital, subsequently discharged, and then also had a cholecystectomy for biliary colic, and symptomatic cholelithiasis.    He is doing well since then.  Has lost weight intentionally.  Has adjusted his diet.  He is on medication for hypertriglyceridemia, and his high cholesterol.    On his last CT scan from March, he did have a possible small developing pseudocyst.    We will obtain an MRCP to evaluate this and also to examine the bile duct and pancreatic duct.    There is a strong family history of cardiac events, at a young age.  Suspect familial hyper lipidemia    Subjective      Past Medical History:   Diagnosis Date    Acute pancreatitis     Bladder mass     benign    Gall stones     High triglycerides     Psoriatic arthritis     Sleep apnea     CPAP       Past Surgical History:   Procedure Laterality Date    ADENOIDECTOMY      BLADDER SURGERY      mass on the outside of the bladder removed, benign    CHOLECYSTECTOMY N/A 2024    Procedure: CHOLECYSTECTOMY LAPAROSCOPIC WITH DAVINCI ROBOT;  Surgeon: Terri Armstrong DO;  Location: [facility];  Service: Robotics - DaVinci;  Laterality: N/A;    COLONOSCOPY      HAND SURGERY Left     thumb repair    TONSILLECTOMY         Family History   Problem Relation Age of Onset    Stroke Mother     Heart disease Father        Social History     Socioeconomic History    Marital status:    Tobacco Use    Smoking status: Former     Current  packs/day: 0.00     Average packs/day: 1 pack/day for 12.3 years (12.3 ttl pk-yrs)     Types: Cigarettes     Start date: 1999     Quit date: 2005     Years since quittin.1     Passive exposure: Past    Smokeless tobacco: Never   Vaping Use    Vaping status: Never Used   Substance and Sexual Activity    Alcohol use: Not Currently    Drug use: Never    Sexual activity: Yes     Partners: Female     Birth control/protection: I.U.D., Same-sex partner         Current Outpatient Medications:     atorvastatin (LIPITOR) 80 MG tablet, Take 1 tablet by mouth Every Night, Disp: 90 tablet, Rfl: 3    Enbrel SureClick 50 MG/ML solution auto-injector, Inject 1 mL under the skin into the appropriate area as directed 1 (One) Time Per Week., Disp: 4 each, Rfl: 2    fenofibrate (TRICOR) 145 MG tablet, Take 1 tablet by mouth Daily, Disp: 90 tablet, Rfl: 3    No Known Allergies    Review of Systems   Constitutional:  Negative for unexpected weight loss.   HENT:  Negative for trouble swallowing.    Gastrointestinal:  Negative for abdominal distention, abdominal pain, anal bleeding, blood in stool, constipation, diarrhea, nausea, rectal pain, vomiting, GERD and indigestion.       The following portions of the patient's history were reviewed and updated as appropriate: allergies, current medications, past family history, past medical history, past social history, past surgical history and problem list.    Objective     Physical Exam:  Vitals:    24 0857   BP: 110/70   Pulse: 69   SpO2: 98%   Weight: 106 kg (234 lb 9.6 oz)       Physical Exam  Constitutional:       General: He is not in acute distress.     Appearance: Normal appearance.   HENT:      Head: Normocephalic and atraumatic.      Mouth/Throat:      Mouth: Mucous membranes are moist.   Eyes:      General: No scleral icterus.     Conjunctiva/sclera: Conjunctivae normal.   Cardiovascular:      Rate and Rhythm: Normal rate.   Pulmonary:      Effort: Pulmonary effort  is normal. No respiratory distress.   Abdominal:      General: There is no distension.      Tenderness: There is no abdominal tenderness.   Musculoskeletal:         General: No deformity or signs of injury.   Skin:     Coloration: Skin is not jaundiced or pale.   Neurological:      General: No focal deficit present.      Mental Status: He is alert and oriented to person, place, and time.   Psychiatric:         Mood and Affect: Mood normal.         Behavior: Behavior normal.         Results Review:   I have reviewed the patient's new clinical and imaging results and agree with the interpretation.     Admission on 2024, Discharged on 2024   Component Date Value Ref Range Status    Reference Lab Report 2024    Final                    Value:[facility]  [address] [place] [address]  Phone: [phone] or [phone]  Fax: [phone]  Kye Elias M.D., Medical Director    PATIENT NAME                           LABORATORY NO.  RAFY ZARATE.                        [id number]  [id number]                         AGE              SEX  N           CLIENT REF #  [facility]            39      1985      [id number]   [id number]    [address] [place] [address] BY-PASS                REQUESTING M.ERIKA     ATTENDING M.D.     COPY TO.  [address]                        [facility] [place] [id number]                 DATE COLLECTED      DATE RECEIVED      DATE REPORTED  2024    DIAGNOSIS:  GALLBLADDER:  Chronic cholecystitis with cholelithiasis  Negative for dysplasia or carcinoma    CLINICAL HISTORY:  Symptomatic cholelithiasis    SPECIMENS RECEIVED:  GALLBLADDER    MICROSCOPIC DESCRIPTION:  Tissue blocks are                           prepared and slides are examined microscopically on all  specimens. See diagnosis for details.    Professional interpretation rendered by Flynn Jo,  "M.D.,F.C.A.P. at [facility] [employer], [address] [place] [address], [place] [address].    GROSS DESCRIPTION:  Received in formalin labeled \"gallbladder\" is a 6.4 x 2.6 x 1.5 cm intact  gallbladder with a clipped cystic duct.  The serosa is pink-purple, smooth, and  focally hemorrhagic.  The adventitia is tan and shaggy.  Opening reveals  minimal tan-green, viscous bile and multiple brown-green, firm choleliths that  range from 0.4 cm to 0.7 cm in greatest dimension.  The mucosa is tan-brown  and velvety with a wall thickness that averages 0.1 cm.  No distinct lesions or  masses are identified, and representative sections from the fundus, body, and  neck to include the cystic duct margin (en face) are submitted in cassette A1.  [facility]    REVIEWED, DIAGNOSED AND ELECTRONICALLY  SIGNED BY:    Flynn Jo M.D.,DEIDRAC.A.P.  CPT CODES:  59380     Admission on 04/23/2024, Discharged on 04/23/2024   Component Date Value Ref Range Status    Glucose 04/23/2024 121 (H)  65 - 99 mg/dL Final    BUN 04/23/2024 27 (H)  6 - 20 mg/dL Final    Creatinine 04/23/2024 1.26  0.76 - 1.27 mg/dL Final    Sodium 04/23/2024 139  136 - 145 mmol/L Final    Potassium 04/23/2024 4.2  3.5 - 5.2 mmol/L Final    Chloride 04/23/2024 102  98 - 107 mmol/L Final    CO2 04/23/2024 26.2  22.0 - 29.0 mmol/L Final    Calcium 04/23/2024 9.7  8.6 - 10.5 mg/dL Final    Total Protein 04/23/2024 8.8 (H)  6.0 - 8.5 g/dL Final    Albumin 04/23/2024 5.0  3.5 - 5.2 g/dL Final    ALT (SGPT) 04/23/2024 37  1 - 41 U/L Final    AST (SGOT) 04/23/2024 28  1 - 40 U/L Final    Alkaline Phosphatase 04/23/2024 87  39 - 117 U/L Final    Total Bilirubin 04/23/2024 0.3  0.0 - 1.2 mg/dL Final    Globulin 04/23/2024 3.8  gm/dL Final    A/G Ratio 04/23/2024 1.3  g/dL Final    BUN/Creatinine Ratio 04/23/2024 21.4  7.0 - 25.0 Final    Anion Gap 04/23/2024 10.8  5.0 - 15.0 mmol/L Final    eGFR 04/23/2024 73.9  >60.0 mL/min/1.73 Final    Lipase 04/23/2024 54  13 - 60 U/L Final    HS Troponin T " 04/23/2024 <6  <22 ng/L Final    Extra Tube 04/23/2024 Hold for add-ons.   Final    Auto resulted.    Extra Tube 04/23/2024 hold for add-on   Final    Auto resulted    Extra Tube 04/23/2024 Hold for add-ons.   Final    Auto resulted.    Extra Tube 04/23/2024 Hold for add-ons.   Final    Auto resulted    WBC 04/23/2024 11.14 (H)  3.40 - 10.80 10*3/mm3 Final    RBC 04/23/2024 4.24  4.14 - 5.80 10*6/mm3 Final    Hemoglobin 04/23/2024 12.8 (L)  13.0 - 17.7 g/dL Final    Hematocrit 04/23/2024 38.5  37.5 - 51.0 % Final    MCV 04/23/2024 90.8  79.0 - 97.0 fL Final    MCH 04/23/2024 30.2  26.6 - 33.0 pg Final    MCHC 04/23/2024 33.2  31.5 - 35.7 g/dL Final    RDW 04/23/2024 13.6  12.3 - 15.4 % Final    RDW-SD 04/23/2024 45.7  37.0 - 54.0 fl Final    MPV 04/23/2024 8.9  6.0 - 12.0 fL Final    Platelets 04/23/2024 344  140 - 450 10*3/mm3 Final    Neutrophil % 04/23/2024 54.0  42.7 - 76.0 % Final    Lymphocyte % 04/23/2024 37.9  19.6 - 45.3 % Final    Monocyte % 04/23/2024 5.7  5.0 - 12.0 % Final    Eosinophil % 04/23/2024 1.3  0.3 - 6.2 % Final    Basophil % 04/23/2024 0.7  0.0 - 1.5 % Final    Immature Grans % 04/23/2024 0.4  0.0 - 0.5 % Final    Neutrophils, Absolute 04/23/2024 6.01  1.70 - 7.00 10*3/mm3 Final    Lymphocytes, Absolute 04/23/2024 4.22 (H)  0.70 - 3.10 10*3/mm3 Final    Monocytes, Absolute 04/23/2024 0.64  0.10 - 0.90 10*3/mm3 Final    Eosinophils, Absolute 04/23/2024 0.15  0.00 - 0.40 10*3/mm3 Final    Basophils, Absolute 04/23/2024 0.08  0.00 - 0.20 10*3/mm3 Final    Immature Grans, Absolute 04/23/2024 0.04  0.00 - 0.05 10*3/mm3 Final    nRBC 04/23/2024 0.0  0.0 - 0.2 /100 WBC Final   Office Visit on 04/09/2024   Component Date Value Ref Range Status    Glucose 04/09/2024 97  65 - 99 mg/dL Final    BUN 04/09/2024 24 (H)  6 - 20 mg/dL Final    Creatinine 04/09/2024 1.41 (H)  0.76 - 1.27 mg/dL Final    EGFR Result 04/09/2024 64.6  >60.0 mL/min/1.73 Final    Comment: GFR Normal >60  Chronic Kidney Disease  <60  Kidney Failure <15      BUN/Creatinine Ratio 04/09/2024 17.0  7.0 - 25.0 Final    Sodium 04/09/2024 139  136 - 145 mmol/L Final    Potassium 04/09/2024 4.7  3.5 - 5.2 mmol/L Final    Chloride 04/09/2024 100  98 - 107 mmol/L Final    Total CO2 04/09/2024 28.7  22.0 - 29.0 mmol/L Final    Calcium 04/09/2024 9.9  8.6 - 10.5 mg/dL Final    Total Protein 04/09/2024 8.5  6.0 - 8.5 g/dL Final    Albumin 04/09/2024 4.7  3.5 - 5.2 g/dL Final    Globulin 04/09/2024 3.8  gm/dL Final    A/G Ratio 04/09/2024 1.2  g/dL Final    Total Bilirubin 04/09/2024 0.3  0.0 - 1.2 mg/dL Final    Alkaline Phosphatase 04/09/2024 90  39 - 117 U/L Final    AST (SGOT) 04/09/2024 30  1 - 40 U/L Final    ALT (SGPT) 04/09/2024 58 (H)  1 - 41 U/L Final    WBC 04/09/2024 6.10  3.40 - 10.80 10*3/mm3 Final    RBC 04/09/2024 3.88 (L)  4.14 - 5.80 10*6/mm3 Final    Hemoglobin 04/09/2024 11.4 (L)  13.0 - 17.7 g/dL Final    Hematocrit 04/09/2024 34.9 (L)  37.5 - 51.0 % Final    MCV 04/09/2024 89.9  79.0 - 97.0 fL Final    MCH 04/09/2024 29.4  26.6 - 33.0 pg Final    MCHC 04/09/2024 32.7  31.5 - 35.7 g/dL Final    RDW 04/09/2024 13.6  12.3 - 15.4 % Final    Platelets 04/09/2024 374  140 - 450 10*3/mm3 Final   No results displayed because visit has over 200 results.         CT Abdomen Pelvis With Contrast    Result Date: 4/23/2024  Impression: 1. Pancreatitis with small fluid collection along the superior margin of the pancreatic tail suggestive of a developing pseudocyst. 2. Cholelithiasis. Authenticated and Electronically Signed by Yair Rowland MD on 04/23/2024 02:43:35 AM    XR Foot 3+ View Right    Result Date: 4/8/2024  No acute bony abnormality. Authenticated and Electronically Signed by Dain Rhodes III, MD on 04/08/2024 10:22:11 AM    CT Abdomen Pelvis With Contrast    Result Date: 3/30/2024  Acute pancreatitis. Authenticated and Electronically Signed by Hardik Canela MD on 03/30/2024 09:26:16 PM    US Gallbladder    Result Date:  3/30/2024  Hepatic steatosis.  Cholelithiasis without cholecystitis. Authenticated and Electronically Signed by Hardik Canela MD on 03/30/2024 09:26:15 PM    XR Abdomen Flat & Upright    Result Date: 3/30/2024  Nonspecific bowel gas pattern.  Consider CT if symptoms persist. Authenticated and Electronically Signed by Tiago Reich DO on 03/30/2024 04:54:23 PM     Assessment / Plan      Assessment & Plan:  Diagnoses and all orders for this visit:    1. Hypertriglyceridemia  -     MRI abdomen w wo contrast mrcp; Future    2. Mixed hyperlipidemia  -     MRI abdomen w wo contrast mrcp; Future    3. History of pancreatitis  -     MRI abdomen w wo contrast mrcp; Future        He has a dental implant.  He will clarify the type, and we will clarify with MRI if it is compatible.    Continue dietary modification.  Low-fat diet.    Continue medical management for the hyperlipidemia.    Follow Up:   Return in about 6 months (around 12/14/2024).    Brenda Nguyễn MD  [facility] [place]    6/14/2024  12:00 EDT    Part of this note may be an electronic transcription/translation of spoken language to printed text using the Dragon Dictation System.

## 2024-06-30 ENCOUNTER — APPOINTMENT (OUTPATIENT)
Dept: CT IMAGING | Facility: HOSPITAL | Age: 39
End: 2024-06-30
Payer: COMMERCIAL

## 2024-06-30 ENCOUNTER — HOSPITAL ENCOUNTER (EMERGENCY)
Facility: HOSPITAL | Age: 39
Discharge: HOME | End: 2024-06-30
Attending: STUDENT IN AN ORGANIZED HEALTH CARE EDUCATION/TRAINING PROGRAM | Admitting: STUDENT IN AN ORGANIZED HEALTH CARE EDUCATION/TRAINING PROGRAM
Payer: COMMERCIAL

## 2024-06-30 ENCOUNTER — APPOINTMENT (OUTPATIENT)
Dept: GENERAL RADIOLOGY | Facility: HOSPITAL | Age: 39
End: 2024-06-30
Payer: COMMERCIAL

## 2024-06-30 VITALS
SYSTOLIC BLOOD PRESSURE: 121 MMHG | DIASTOLIC BLOOD PRESSURE: 76 MMHG | OXYGEN SATURATION: 97 % | BODY MASS INDEX: 34.8 KG/M2 | TEMPERATURE: 98 F | HEART RATE: 65 BPM | HEIGHT: 69 IN | RESPIRATION RATE: 16 BRPM | WEIGHT: 235 LBS

## 2024-06-30 DIAGNOSIS — K85.90 ACUTE PANCREATITIS WITHOUT INFECTION OR NECROSIS, UNSPECIFIED PANCREATITIS TYPE: Primary | ICD-10-CM

## 2024-06-30 LAB
ALBUMIN SERPL-MCNC: 4.7 G/DL (ref 3.5–5.2)
ALBUMIN/GLOB SERPL: 1.6 G/DL
ALP SERPL-CCNC: 70 U/L (ref 39–117)
ALT SERPL W P-5'-P-CCNC: 43 U/L (ref 1–41)
ANION GAP SERPL CALCULATED.3IONS-SCNC: 11.1 MMOL/L (ref 5–15)
AST SERPL-CCNC: 28 U/L (ref 1–40)
BASOPHILS # BLD AUTO: 0.05 10*3/MM3 (ref 0–0.2)
BASOPHILS NFR BLD AUTO: 0.5 % (ref 0–1.5)
BILIRUB SERPL-MCNC: 0.3 MG/DL (ref 0–1.2)
BUN SERPL-MCNC: 25 MG/DL (ref 6–20)
BUN/CREAT SERPL: 19.4 (ref 7–25)
CALCIUM SPEC-SCNC: 9.2 MG/DL (ref 8.6–10.5)
CHLORIDE SERPL-SCNC: 108 MMOL/L (ref 98–107)
CO2 SERPL-SCNC: 25.9 MMOL/L (ref 22–29)
CREAT SERPL-MCNC: 1.29 MG/DL (ref 0.76–1.27)
DEPRECATED RDW RBC AUTO: 42.5 FL (ref 37–54)
EGFRCR SERPLBLD CKD-EPI 2021: 71.9 ML/MIN/1.73
EOSINOPHIL # BLD AUTO: 0.2 10*3/MM3 (ref 0–0.4)
EOSINOPHIL NFR BLD AUTO: 2.2 % (ref 0.3–6.2)
ERYTHROCYTE [DISTWIDTH] IN BLOOD BY AUTOMATED COUNT: 12.7 % (ref 12.3–15.4)
GLOBULIN UR ELPH-MCNC: 3 GM/DL
GLUCOSE SERPL-MCNC: 116 MG/DL (ref 65–99)
HCT VFR BLD AUTO: 40.1 % (ref 37.5–51)
HGB BLD-MCNC: 13.3 G/DL (ref 13–17.7)
HOLD SPECIMEN: NORMAL
HOLD SPECIMEN: NORMAL
IMM GRANULOCYTES # BLD AUTO: 0.02 10*3/MM3 (ref 0–0.05)
IMM GRANULOCYTES NFR BLD AUTO: 0.2 % (ref 0–0.5)
LIPASE SERPL-CCNC: 30 U/L (ref 13–60)
LYMPHOCYTES # BLD AUTO: 4.96 10*3/MM3 (ref 0.7–3.1)
LYMPHOCYTES NFR BLD AUTO: 54.4 % (ref 19.6–45.3)
MCH RBC QN AUTO: 30.4 PG (ref 26.6–33)
MCHC RBC AUTO-ENTMCNC: 33.2 G/DL (ref 31.5–35.7)
MCV RBC AUTO: 91.8 FL (ref 79–97)
MONOCYTES # BLD AUTO: 0.55 10*3/MM3 (ref 0.1–0.9)
MONOCYTES NFR BLD AUTO: 6 % (ref 5–12)
NEUTROPHILS NFR BLD AUTO: 3.34 10*3/MM3 (ref 1.7–7)
NEUTROPHILS NFR BLD AUTO: 36.7 % (ref 42.7–76)
NRBC BLD AUTO-RTO: 0 /100 WBC (ref 0–0.2)
NT-PROBNP SERPL-MCNC: 57.6 PG/ML (ref 0–450)
PLATELET # BLD AUTO: 280 10*3/MM3 (ref 140–450)
PMV BLD AUTO: 9.2 FL (ref 6–12)
POTASSIUM SERPL-SCNC: 4.1 MMOL/L (ref 3.5–5.2)
PROT SERPL-MCNC: 7.7 G/DL (ref 6–8.5)
RBC # BLD AUTO: 4.37 10*6/MM3 (ref 4.14–5.8)
SODIUM SERPL-SCNC: 145 MMOL/L (ref 136–145)
TROPONIN T SERPL HS-MCNC: <6 NG/L
WBC NRBC COR # BLD AUTO: 9.12 10*3/MM3 (ref 3.4–10.8)
WHOLE BLOOD HOLD COAG: NORMAL
WHOLE BLOOD HOLD SPECIMEN: NORMAL

## 2024-06-30 PROCEDURE — 36415 COLL VENOUS BLD VENIPUNCTURE: CPT

## 2024-06-30 PROCEDURE — 25510000001 IOPAMIDOL 61 % SOLUTION: Performed by: STUDENT IN AN ORGANIZED HEALTH CARE EDUCATION/TRAINING PROGRAM

## 2024-06-30 PROCEDURE — 83880 ASSAY OF NATRIURETIC PEPTIDE: CPT | Performed by: STUDENT IN AN ORGANIZED HEALTH CARE EDUCATION/TRAINING PROGRAM

## 2024-06-30 PROCEDURE — 25810000003 LACTATED RINGERS SOLUTION: Performed by: STUDENT IN AN ORGANIZED HEALTH CARE EDUCATION/TRAINING PROGRAM

## 2024-06-30 PROCEDURE — 71045 X-RAY EXAM CHEST 1 VIEW: CPT

## 2024-06-30 PROCEDURE — 93005 ELECTROCARDIOGRAM TRACING: CPT | Performed by: STUDENT IN AN ORGANIZED HEALTH CARE EDUCATION/TRAINING PROGRAM

## 2024-06-30 PROCEDURE — 96375 TX/PRO/DX INJ NEW DRUG ADDON: CPT

## 2024-06-30 PROCEDURE — 84484 ASSAY OF TROPONIN QUANT: CPT | Performed by: STUDENT IN AN ORGANIZED HEALTH CARE EDUCATION/TRAINING PROGRAM

## 2024-06-30 PROCEDURE — 25010000002 ONDANSETRON PER 1 MG: Performed by: STUDENT IN AN ORGANIZED HEALTH CARE EDUCATION/TRAINING PROGRAM

## 2024-06-30 PROCEDURE — 99285 EMERGENCY DEPT VISIT HI MDM: CPT

## 2024-06-30 PROCEDURE — 74177 CT ABD & PELVIS W/CONTRAST: CPT

## 2024-06-30 PROCEDURE — 85025 COMPLETE CBC W/AUTO DIFF WBC: CPT | Performed by: STUDENT IN AN ORGANIZED HEALTH CARE EDUCATION/TRAINING PROGRAM

## 2024-06-30 PROCEDURE — 80053 COMPREHEN METABOLIC PANEL: CPT | Performed by: STUDENT IN AN ORGANIZED HEALTH CARE EDUCATION/TRAINING PROGRAM

## 2024-06-30 PROCEDURE — 83690 ASSAY OF LIPASE: CPT | Performed by: STUDENT IN AN ORGANIZED HEALTH CARE EDUCATION/TRAINING PROGRAM

## 2024-06-30 PROCEDURE — 93005 ELECTROCARDIOGRAM TRACING: CPT

## 2024-06-30 PROCEDURE — 96374 THER/PROPH/DIAG INJ IV PUSH: CPT

## 2024-06-30 PROCEDURE — 25010000002 MORPHINE PER 10 MG: Performed by: STUDENT IN AN ORGANIZED HEALTH CARE EDUCATION/TRAINING PROGRAM

## 2024-06-30 RX ORDER — ACETAMINOPHEN 500 MG
500 TABLET ORAL EVERY 6 HOURS PRN
Qty: 30 TABLET | Refills: 0 | Status: SHIPPED | OUTPATIENT
Start: 2024-06-30

## 2024-06-30 RX ORDER — SODIUM CHLORIDE 0.9 % (FLUSH) 0.9 %
10 SYRINGE (ML) INJECTION AS NEEDED
Status: DISCONTINUED | OUTPATIENT
Start: 2024-06-30 | End: 2024-06-30 | Stop reason: HOSPADM

## 2024-06-30 RX ORDER — FAMOTIDINE 10 MG/ML
20 INJECTION, SOLUTION INTRAVENOUS ONCE
Status: COMPLETED | OUTPATIENT
Start: 2024-06-30 | End: 2024-06-30

## 2024-06-30 RX ORDER — OXYCODONE HYDROCHLORIDE 5 MG/1
5 TABLET ORAL EVERY 4 HOURS PRN
Qty: 12 TABLET | Refills: 0 | Status: SHIPPED | OUTPATIENT
Start: 2024-06-30

## 2024-06-30 RX ORDER — ONDANSETRON 4 MG/1
4 TABLET, ORALLY DISINTEGRATING ORAL 4 TIMES DAILY PRN
Qty: 12 TABLET | Refills: 0 | Status: SHIPPED | OUTPATIENT
Start: 2024-06-30

## 2024-06-30 RX ORDER — ALUMINA, MAGNESIA, AND SIMETHICONE 2400; 2400; 240 MG/30ML; MG/30ML; MG/30ML
15 SUSPENSION ORAL ONCE
Status: COMPLETED | OUTPATIENT
Start: 2024-06-30 | End: 2024-06-30

## 2024-06-30 RX ORDER — ONDANSETRON 2 MG/ML
4 INJECTION INTRAMUSCULAR; INTRAVENOUS ONCE
Status: COMPLETED | OUTPATIENT
Start: 2024-06-30 | End: 2024-06-30

## 2024-06-30 RX ORDER — MORPHINE SULFATE 2 MG/ML
2 INJECTION, SOLUTION INTRAMUSCULAR; INTRAVENOUS ONCE
Status: DISCONTINUED | OUTPATIENT
Start: 2024-06-30 | End: 2024-06-30

## 2024-06-30 RX ADMIN — SODIUM CHLORIDE, POTASSIUM CHLORIDE, SODIUM LACTATE AND CALCIUM CHLORIDE 500 ML: 600; 310; 30; 20 INJECTION, SOLUTION INTRAVENOUS at 07:34

## 2024-06-30 RX ADMIN — IOPAMIDOL 100 ML: 612 INJECTION, SOLUTION INTRAVENOUS at 06:45

## 2024-06-30 RX ADMIN — ALUMINUM HYDROXIDE, MAGNESIUM HYDROXIDE, DIMETHICONE 15 ML: 400; 400; 40 SUSPENSION ORAL at 06:35

## 2024-06-30 RX ADMIN — MORPHINE SULFATE 4 MG: 4 INJECTION, SOLUTION INTRAMUSCULAR; INTRAVENOUS at 07:35

## 2024-06-30 RX ADMIN — ONDANSETRON 4 MG: 2 INJECTION INTRAMUSCULAR; INTRAVENOUS at 06:32

## 2024-06-30 RX ADMIN — FAMOTIDINE 20 MG: 10 INJECTION INTRAVENOUS at 06:33

## 2024-06-30 NOTE — DISCHARGE INSTRUCTIONS
Practice graduated eating plan to avoid rebound symptoms as discussed  Take Tylenol as needed for pain. For breakthrough pain, take oxycodone. No driving or operating heavy machinery while taking this medication. Do not combine with alcohol.  Take Zofran as needed for nausea and vomiting.   Do not hesitate to return if your symptoms worsen.

## 2024-07-01 ENCOUNTER — HOSPITAL ENCOUNTER (EMERGENCY)
Facility: HOSPITAL | Age: 39
Discharge: OTHER INSTITUTION | End: 2024-07-02
Attending: EMERGENCY MEDICINE
Payer: COMMERCIAL

## 2024-07-01 ENCOUNTER — APPOINTMENT (OUTPATIENT)
Dept: CT IMAGING | Facility: HOSPITAL | Age: 39
End: 2024-07-01
Payer: COMMERCIAL

## 2024-07-01 VITALS
WEIGHT: 222.2 LBS | HEART RATE: 64 BPM | BODY MASS INDEX: 32.91 KG/M2 | RESPIRATION RATE: 14 BRPM | OXYGEN SATURATION: 96 % | TEMPERATURE: 97.9 F | SYSTOLIC BLOOD PRESSURE: 130 MMHG | DIASTOLIC BLOOD PRESSURE: 87 MMHG | HEIGHT: 69 IN

## 2024-07-01 DIAGNOSIS — R74.01 TRANSAMINITIS: Primary | ICD-10-CM

## 2024-07-01 LAB
ALBUMIN SERPL-MCNC: 4.8 G/DL (ref 3.5–5.2)
ALBUMIN/GLOB SERPL: 1.5 G/DL
ALP SERPL-CCNC: 212 U/L (ref 39–117)
ALT SERPL W P-5'-P-CCNC: 951 U/L (ref 1–41)
ANION GAP SERPL CALCULATED.3IONS-SCNC: 10.4 MMOL/L (ref 5–15)
AST SERPL-CCNC: 648 U/L (ref 1–40)
BACTERIA UR QL AUTO: NORMAL /HPF
BASOPHILS # BLD AUTO: 0.03 10*3/MM3 (ref 0–0.2)
BASOPHILS NFR BLD AUTO: 0.5 % (ref 0–1.5)
BILIRUB SERPL-MCNC: 2.8 MG/DL (ref 0–1.2)
BILIRUB UR QL STRIP: ABNORMAL
BUN SERPL-MCNC: 17 MG/DL (ref 6–20)
BUN/CREAT SERPL: 13.8 (ref 7–25)
CALCIUM SPEC-SCNC: 9.4 MG/DL (ref 8.6–10.5)
CHLORIDE SERPL-SCNC: 103 MMOL/L (ref 98–107)
CLARITY UR: CLEAR
CO2 SERPL-SCNC: 26.6 MMOL/L (ref 22–29)
COLOR UR: ABNORMAL
CREAT SERPL-MCNC: 1.23 MG/DL (ref 0.76–1.27)
DEPRECATED RDW RBC AUTO: 42 FL (ref 37–54)
EGFRCR SERPLBLD CKD-EPI 2021: 76.1 ML/MIN/1.73
EOSINOPHIL # BLD AUTO: 0.09 10*3/MM3 (ref 0–0.4)
EOSINOPHIL NFR BLD AUTO: 1.4 % (ref 0.3–6.2)
ERYTHROCYTE [DISTWIDTH] IN BLOOD BY AUTOMATED COUNT: 12.8 % (ref 12.3–15.4)
GLOBULIN UR ELPH-MCNC: 3.2 GM/DL
GLUCOSE SERPL-MCNC: 108 MG/DL (ref 65–99)
GLUCOSE UR STRIP-MCNC: NEGATIVE MG/DL
HCT VFR BLD AUTO: 37.2 % (ref 37.5–51)
HGB BLD-MCNC: 12.6 G/DL (ref 13–17.7)
HGB UR QL STRIP.AUTO: NEGATIVE
HOLD SPECIMEN: NORMAL
HOLD SPECIMEN: NORMAL
HYALINE CASTS UR QL AUTO: NORMAL /LPF
IMM GRANULOCYTES # BLD AUTO: 0.02 10*3/MM3 (ref 0–0.05)
IMM GRANULOCYTES NFR BLD AUTO: 0.3 % (ref 0–0.5)
KETONES UR QL STRIP: NEGATIVE
LEUKOCYTE ESTERASE UR QL STRIP.AUTO: NEGATIVE
LIPASE SERPL-CCNC: 24 U/L (ref 13–60)
LYMPHOCYTES # BLD AUTO: 2.31 10*3/MM3 (ref 0.7–3.1)
LYMPHOCYTES NFR BLD AUTO: 35.5 % (ref 19.6–45.3)
MCH RBC QN AUTO: 30.4 PG (ref 26.6–33)
MCHC RBC AUTO-ENTMCNC: 33.9 G/DL (ref 31.5–35.7)
MCV RBC AUTO: 89.9 FL (ref 79–97)
MONOCYTES # BLD AUTO: 0.38 10*3/MM3 (ref 0.1–0.9)
MONOCYTES NFR BLD AUTO: 5.8 % (ref 5–12)
NEUTROPHILS NFR BLD AUTO: 3.68 10*3/MM3 (ref 1.7–7)
NEUTROPHILS NFR BLD AUTO: 56.5 % (ref 42.7–76)
NITRITE UR QL STRIP: NEGATIVE
NRBC BLD AUTO-RTO: 0 /100 WBC (ref 0–0.2)
PH UR STRIP.AUTO: 7 [PH] (ref 5–8)
PLATELET # BLD AUTO: 274 10*3/MM3 (ref 140–450)
PMV BLD AUTO: 9.3 FL (ref 6–12)
POTASSIUM SERPL-SCNC: 4.3 MMOL/L (ref 3.5–5.2)
PROT SERPL-MCNC: 8 G/DL (ref 6–8.5)
PROT UR QL STRIP: NEGATIVE
RBC # BLD AUTO: 4.14 10*6/MM3 (ref 4.14–5.8)
RBC # UR STRIP: NORMAL /HPF
REF LAB TEST METHOD: NORMAL
SODIUM SERPL-SCNC: 140 MMOL/L (ref 136–145)
SP GR UR STRIP: 1.02 (ref 1–1.03)
SQUAMOUS #/AREA URNS HPF: NORMAL /HPF
UROBILINOGEN UR QL STRIP: ABNORMAL
WBC # UR STRIP: NORMAL /HPF
WBC NRBC COR # BLD AUTO: 6.51 10*3/MM3 (ref 3.4–10.8)
WHOLE BLOOD HOLD COAG: NORMAL
WHOLE BLOOD HOLD SPECIMEN: NORMAL

## 2024-07-01 PROCEDURE — 25010000002 MORPHINE PER 10 MG: Performed by: EMERGENCY MEDICINE

## 2024-07-01 PROCEDURE — 81001 URINALYSIS AUTO W/SCOPE: CPT | Performed by: EMERGENCY MEDICINE

## 2024-07-01 PROCEDURE — 96376 TX/PRO/DX INJ SAME DRUG ADON: CPT

## 2024-07-01 PROCEDURE — 83690 ASSAY OF LIPASE: CPT | Performed by: EMERGENCY MEDICINE

## 2024-07-01 PROCEDURE — 25810000003 SODIUM CHLORIDE 0.9 % SOLUTION: Performed by: EMERGENCY MEDICINE

## 2024-07-01 PROCEDURE — 25010000002 ONDANSETRON PER 1 MG: Performed by: EMERGENCY MEDICINE

## 2024-07-01 PROCEDURE — 96374 THER/PROPH/DIAG INJ IV PUSH: CPT

## 2024-07-01 PROCEDURE — 96375 TX/PRO/DX INJ NEW DRUG ADDON: CPT

## 2024-07-01 PROCEDURE — 85025 COMPLETE CBC W/AUTO DIFF WBC: CPT | Performed by: EMERGENCY MEDICINE

## 2024-07-01 PROCEDURE — 36415 COLL VENOUS BLD VENIPUNCTURE: CPT

## 2024-07-01 PROCEDURE — 99285 EMERGENCY DEPT VISIT HI MDM: CPT

## 2024-07-01 PROCEDURE — 80053 COMPREHEN METABOLIC PANEL: CPT | Performed by: EMERGENCY MEDICINE

## 2024-07-01 RX ORDER — ONDANSETRON 2 MG/ML
4 INJECTION INTRAMUSCULAR; INTRAVENOUS ONCE
Status: COMPLETED | OUTPATIENT
Start: 2024-07-01 | End: 2024-07-01

## 2024-07-01 RX ORDER — SODIUM CHLORIDE 0.9 % (FLUSH) 0.9 %
10 SYRINGE (ML) INJECTION AS NEEDED
Status: DISCONTINUED | OUTPATIENT
Start: 2024-07-01 | End: 2024-07-02 | Stop reason: HOSPADM

## 2024-07-01 RX ADMIN — MORPHINE SULFATE 4 MG: 4 INJECTION, SOLUTION INTRAMUSCULAR; INTRAVENOUS at 23:51

## 2024-07-01 RX ADMIN — SODIUM CHLORIDE 1000 ML: 9 INJECTION, SOLUTION INTRAVENOUS at 21:54

## 2024-07-01 RX ADMIN — MORPHINE SULFATE 4 MG: 4 INJECTION, SOLUTION INTRAMUSCULAR; INTRAVENOUS at 21:54

## 2024-07-01 RX ADMIN — ONDANSETRON 4 MG: 2 INJECTION INTRAMUSCULAR; INTRAVENOUS at 22:13

## 2024-07-02 NOTE — ED NOTES
Report give to MARILUZ Mata at [facility] Emergency department at this time.

## 2024-07-02 NOTE — ED NOTES
Advised [facility] at [facility] not place patient on waiting list.  Per Dr. Lua, patient was accepted at .

## 2024-07-02 NOTE — ED PROVIDER NOTES
[facility] EMERGENCY DEPARTMENT  Emergency Department Encounter  Emergency Medicine Physician Note     Pt Name:Ciera Gibson  MRN: [id number]  Birthdate 1985  Date of evaluation: 7/1/2024  PCP:  Tyra Bro APRN  Note Started: 9:15 PM EDT      CHIEF COMPLAINT       Chief Complaint   Patient presents with    Pain       HISTORY OF PRESENT ILLNESS  (Location/Symptom, Timing/Onset, Context/Setting, Quality, Duration, Modifying Factors, Severity.)      Ciera Gibson is a 39 y.o. male who presents with left lower quadrant abdominal pain.  Patient has a history of pancreatitis.  Patient denies any alcohol use, did quit alcohol after his first bout of pancreatitis.  Patient has pancreatitis secondary to triglyceridemia.  Patient was recently evaluated with CT imaging yesterday that noted pancreatitis.  Patient states that is not getting better, patient was on a clear liquid diet at home with pain control at home.  Patient failed outpatient management.  Patient here today with worsening pain noted to be severe in nature.  Patient describes associated nausea and vomiting.    PAST MEDICAL / SURGICAL / SOCIAL / FAMILY HISTORY     Past Medical History:   Diagnosis Date    Acute pancreatitis     Bladder mass 2016    benign    Gall stones     High triglycerides     Psoriatic arthritis     Sleep apnea     CPAP     No additional pertinent       Past Surgical History:   Procedure Laterality Date    ADENOIDECTOMY      BLADDER SURGERY      mass on the outside of the bladder removed, benign    CHOLECYSTECTOMY N/A 5/22/2024    Procedure: CHOLECYSTECTOMY LAPAROSCOPIC WITH DAVINCI ROBOT;  Surgeon: Terri Armstrong DO;  Location: [facility];  Service: Robotics - DaVinci;  Laterality: N/A;    COLONOSCOPY      HAND SURGERY Left     thumb repair    TONSILLECTOMY       No additional pertinent       Social History     Socioeconomic History    Marital status:    Tobacco Use    Smoking status: Former      Current packs/day: 0.00     Average packs/day: 1 pack/day for 12.3 years (12.3 ttl pk-yrs)     Types: Cigarettes     Start date: 1999     Quit date: 2005     Years since quittin.1     Passive exposure: Past    Smokeless tobacco: Never   Vaping Use    Vaping status: Never Used   Substance and Sexual Activity    Alcohol use: Not Currently    Drug use: Never    Sexual activity: Yes     Partners: Female     Birth control/protection: I.U.D., Same-sex partner       Family History   Problem Relation Age of Onset    Stroke Mother     Heart disease Father        Allergies:  Patient has no known allergies.    Home Medications:  Prior to Admission medications    Medication Sig Start Date End Date Taking? Authorizing Provider   acetaminophen (TYLENOL) 500 MG tablet Take 1 tablet by mouth Every 6 (Six) Hours As Needed for Mild Pain. 24   Ross Cooley MD   atorvastatin (LIPITOR) 80 MG tablet Take 1 tablet by mouth Every Night 24   Tyra Bro APRN   Enbrel SureClick 50 MG/ML solution auto-injector Inject 1 mL under the skin into the appropriate area as directed 1 (One) Time Per Week. 24   Pamela Lakhani APRN   fenofibrate (TRICOR) 145 MG tablet Take 1 tablet by mouth Daily 24   Tyra Bro APRN   ondansetron ODT (ZOFRAN-ODT) 4 MG disintegrating tablet Take 1 tablet by mouth 4 (Four) Times a Day As Needed for Nausea or Vomiting. 24   Ross Cooley MD   oxyCODONE (Roxicodone) 5 MG immediate release tablet Take 1 tablet by mouth Every 4 (Four) Hours As Needed for Moderate Pain. 24   Ross Cooley MD         REVIEW OF SYSTEMS       Review of Systems   Constitutional:  Negative for chills and fever.   Respiratory:  Negative for chest tightness and shortness of breath.    Cardiovascular:  Negative for chest pain.   Gastrointestinal:  Positive for abdominal pain and nausea. Negative for diarrhea and vomiting.   Genitourinary:  Negative for flank pain.   Neurological:   "Negative for dizziness and light-headedness.       PHYSICAL EXAM      INITIAL VITALS:   /87   Pulse 64   Temp 97.9 °F (36.6 °C) (Oral)   Resp 14   Ht 175.3 cm (69\")   Wt 101 kg (222 lb 3.2 oz)   SpO2 96%   BMI 32.81 kg/m²     Physical Exam  Constitutional:       General: He is in acute distress.      Appearance: Normal appearance. He is ill-appearing.   HENT:      Head: Normocephalic and atraumatic.   Eyes:      Extraocular Movements: Extraocular movements intact.   Cardiovascular:      Rate and Rhythm: Normal rate and regular rhythm.   Pulmonary:      Effort: Pulmonary effort is normal.      Breath sounds: Normal breath sounds.   Abdominal:      General: Abdomen is flat.      Palpations: Abdomen is soft.      Tenderness: There is abdominal tenderness. There is guarding and rebound.   Musculoskeletal:      Right lower leg: No edema.      Left lower leg: No edema.   Skin:     General: Skin is warm and dry.   Neurological:      General: No focal deficit present.      Mental Status: He is alert and oriented to person, place, and time.   Psychiatric:         Mood and Affect: Mood normal.         Behavior: Behavior normal.           DDX/DIAGNOSTIC RESULTS / EMERGENCY DEPARTMENT COURSE / MDM     Differential Diagnosis included but not limited: Pancreatitis, choledocholithiasis, abdominal mass blocking the ampulla of Vater.  Hepatitis pancreatic abscess    Diagnoses Considered but Do Not Suspect: Patient afebrile, nontachycardic, presenting with no SIRS signs.  Low concern for severe systemic infection.    Decision Rules/Scores utilized: N/A     Tests considered but not ordered and why:  N/A     MIPS: N/A     Code Status Discussion:  Not Discussed    Additional Patient Education Provided: None     Medical Decision Making    Medical Decision Making  Patient presenting with severe epigastric abdominal pain.  Patient has a history of pancreatitis.  Patient was diagnosed pancreatitis yesterday and sent for " outpatient management.  Patient with failed outpatient management returns today.  Laboratory workup demonstrated severe transaminitis with elevated bilirubin.  Patient did have normal lipase.  With CT scan demonstrated concerns for failed outpatient management and severe transaminitis do feel this possible choledocholithiasis leading to patient's symptoms.  Patient may need MRCP and ERCP.  Patient's case discussed with GI.  Patient transferred to [facility] for further management care, accepted by Dr. Lakhani.  Patient transferred without any complication.    Problems Addressed:  Transaminitis: complicated acute illness or injury    Amount and/or Complexity of Data Reviewed  Labs: ordered. Decision-making details documented in ED Course.    Risk  Prescription drug management.        See ED COURSE for additional MDM statements    EKG  None Performed     All EKG's are interpreted by the Emergency Department Physician who either signs or Co-signs this chart in the absence of a cardiologist.    Additional Scores                   EMERGENCY DEPARTMENT COURSE:    ED Course as of 07/02/24 0307   Mon Jul 01, 2024   2200 Patient's lipase minimally unchanged from yesterday. [CR]   2218 ALT (SGPT)(!): 951  Patient noted to have severe transaminitis. [CR]   2230 Discussed patient's case with GI at [facility] Dr. Perez. He feels the patient needs MRCP and further evaluation including ERCP.  He recommends transfer [CR]   2231 Discussed transferring patient to [facility], GI will not discussed with me, recommend discussion with hospitalist.  There is noted to be a wait list. [CR]   2250 Patient's case discussed with Dr. Lakhani at [facility].  Patient accepted to [facility] emergency department. [CR]      ED Course User Index  [CR] Getachew Lua, DO       PROCEDURES:  None Performed   Procedures    DATA FOR LAB AND RADIOLOGY TESTS ORDERED BELOW ARE REVIEWED BY THE ED CLINICIAN:    RADIOLOGY:  All x-rays, CT, MRI, and formal ultrasound images (except ED bedside ultrasound) are read by the radiologist, see reports below, unless otherwise noted in MDM or here.  Reports below are reviewed by myself.  No orders to display       LABS: Lab orders shown below, the results are reviewed by myself, and all abnormals are listed below.  Labs Reviewed   COMPREHENSIVE METABOLIC PANEL - Abnormal; Notable for the following components:       Result Value    Glucose 108 (*)     ALT (SGPT) 951 (*)     AST (SGOT) 648 (*)     Alkaline Phosphatase 212 (*)     Total Bilirubin 2.8 (*)     All other components within normal limits    Narrative:     GFR Normal >60  Chronic Kidney Disease <60  Kidney Failure <15     CBC WITH AUTO DIFFERENTIAL - Abnormal; Notable for the following components:    Hemoglobin 12.6 (*)     Hematocrit 37.2 (*)     All other components within normal limits   URINALYSIS WITHOUT MICROSCOPIC (NO CULTURE) - Abnormal; Notable for the following components:    Color, UA Dark Yellow (*)     Bilirubin, UA Moderate (2+) (*)     All other components within normal limits   LIPASE - Normal   RAINBOW DRAW    Narrative:     The following orders were created for panel order [place] Draw.  Procedure                               Abnormality         Status                     ---------                               -----------         ------                     Green Top (Gel)[[id number]]                                  Final result               Lavender Top[[id number]]                                     Final result               Gold Top - SST[[id number]]                                   Final result               Light Blue Top[[id number]]                                   Final result                 Please view results for these tests on the individual orders.   URINALYSIS, MICROSCOPIC ONLY   GREEN TOP   LAVENDER TOP   GOLD TOP - SST   LIGHT BLUE TOP   CBC AND DIFFERENTIAL    Narrative:     The following orders were  created for panel order CBC & Differential.  Procedure                               Abnormality         Status                     ---------                               -----------         ------                     CBC Auto Differential[[id number]]        Abnormal            Final result                 Please view results for these tests on the individual orders.   URINALYSIS AND MICROSCOPIC    Narrative:     The following orders were created for panel order Urinalysis With Microscopic - Urine, Clean Catch.  Procedure                               Abnormality         Status                     ---------                               -----------         ------                     Urinalysis without micro...[[id number]]  Abnormal            Final result               Urinalysis, Microscopic ...[[id number]]                      Final result                 Please view results for these tests on the individual orders.       Vitals Reviewed:    Vitals:    07/01/24 2213 07/01/24 2328 07/01/24 2350 07/01/24 2353   BP: 137/90 120/82 130/87    BP Location:       Patient Position:       Pulse:    64   Resp:       Temp:       TempSrc:       SpO2: 97% 96%  96%   Weight:       Height:           MEDICATIONS GIVEN TO PATIENT THIS ENCOUNTER:  Medications   sodium chloride 0.9 % bolus 1,000 mL (0 mL Intravenous Stopped 7/1/24 2354)   morphine injection 4 mg (4 mg Intravenous Given 7/1/24 2154)   ondansetron (ZOFRAN) injection 4 mg (4 mg Intravenous Given 7/1/24 2213)   morphine injection 4 mg (4 mg Intravenous Given 7/1/24 2351)       CONSULTS:  None    CRITICAL CARE:  There was significant risk of life threatening deterioration of patient's condition requiring my direct management. Critical care time 0 minutes, excluding any documented procedures.    FINAL IMPRESSION      1. Transaminitis          DISPOSITION / PLAN     ED Disposition       ED Disposition   Transfer to Another Facility     Condition   --    Comment   --                PATIENT REFERRED TO:  No follow-up provider specified.    DISCHARGE MEDICATIONS:     Medication List        ASK your doctor about these medications      acetaminophen 500 MG tablet  Commonly known as: TYLENOL  Take 1 tablet by mouth Every 6 (Six) Hours As Needed for Mild Pain.     atorvastatin 80 MG tablet  Commonly known as: LIPITOR  Take 1 tablet by mouth Every Night     Enbrel SureClick 50 MG/ML solution auto-injector  Generic drug: Etanercept  Inject 1 mL under the skin into the appropriate area as directed 1 (One) Time Per Week.     fenofibrate 145 MG tablet  Commonly known as: TRICOR  Take 1 tablet by mouth Daily     ondansetron ODT 4 MG disintegrating tablet  Commonly known as: ZOFRAN-ODT  Take 1 tablet by mouth 4 (Four) Times a Day As Needed for Nausea or Vomiting.     oxyCODONE 5 MG immediate release tablet  Commonly known as: Roxicodone  Take 1 tablet by mouth Every 4 (Four) Hours As Needed for Moderate Pain.              Electronically signed by Getachew Lua DO, 07/01/24, 9:15 PM EDT.    Emergency Medicine Physician  [facility]  (Please note that portions of thisnote were completed with a voice recognition program.  Efforts were made to edit the dictations but occasionally words are mis-transcribed.)       Getachew Lua DO  07/02/24 0307

## 2024-07-04 ENCOUNTER — READMISSION MANAGEMENT (OUTPATIENT)
Dept: CALL CENTER | Facility: HOSPITAL | Age: 39
End: 2024-07-04
Payer: COMMERCIAL

## 2024-07-04 NOTE — OUTREACH NOTE
Prep Survey      Flowsheet Row Responses   [facility]? Non-[facility]   Is LACE score < 7 ? Non-BH Discharge   Eligibility [facility]   Date of Admission 07/02/24   Date of Discharge 07/04/24   Discharge Disposition Home or Self Care   Discharge diagnosis Acute pancreatitis   Does the patient have one of the following disease processes/diagnoses(primary or secondary)? Other   Does the patient have Home health ordered? No   Prep survey completed? Yes            RODERICK A - Registered Nurse

## 2024-07-05 ENCOUNTER — TRANSITIONAL CARE MANAGEMENT TELEPHONE ENCOUNTER (OUTPATIENT)
Dept: CALL CENTER | Facility: HOSPITAL | Age: 39
End: 2024-07-05
Payer: COMMERCIAL

## 2024-07-05 ENCOUNTER — TELEPHONE (OUTPATIENT)
Dept: GASTROENTEROLOGY | Facility: CLINIC | Age: 39
End: 2024-07-05
Payer: COMMERCIAL

## 2024-07-05 ENCOUNTER — TELEPHONE (OUTPATIENT)
Dept: INTERNAL MEDICINE | Facility: CLINIC | Age: 39
End: 2024-07-05
Payer: COMMERCIAL

## 2024-07-05 DIAGNOSIS — R79.89 ABNORMAL LFTS: ICD-10-CM

## 2024-07-05 DIAGNOSIS — R10.9 ABDOMINAL PAIN, UNSPECIFIED ABDOMINAL LOCATION: ICD-10-CM

## 2024-07-05 DIAGNOSIS — Z87.19 HISTORY OF PANCREATITIS: Primary | ICD-10-CM

## 2024-07-05 NOTE — TELEPHONE ENCOUNTER
Relayed to patient. He said he has all meds. on hold since discharge. You do not have any 30 minute appt. availability next week. Anywhere that we can schedule him?

## 2024-07-05 NOTE — TELEPHONE ENCOUNTER
Caller: Ciera Gibson    Relationship to patient: Self    Best call back number: [phone]    Chief complaint: PANCREATITIS    Type of visit: HOSPITAL     Additional notes:    PATIENT HAS APPOINTMENT FOR PHYSICAL 7/16.    PATIENT WENT TO [facility] EMERGENCY ROOM AND WAS SENT TO  ON 7/4.  HE HAS PANCREATITIS.    DOES MARKEL ECHEVARRIA WANT TO SEE HIM BEFORE 7/16?    ER STOPPED FENOFIBRATE UNTIL DOCTOR SWATHI SEES HIM

## 2024-07-05 NOTE — OUTREACH NOTE
Call Center TCM Note      Flowsheet Row Responses   [facility] patient discharged from? Non-  [[facility]]   Does the patient have one of the following disease processes/diagnoses(primary or secondary)? Other   TCM attempt successful? Yes   Call start time 1708   Call end time 1711   Discharge diagnosis Acute pancreatitis   Is patient permission given to speak with other caregiver? Yes   List who call center can speak with Lalit Gibson--Spouse   Person spoke with today (if not patient) and relationship Patient   Meds reviewed with patient/caregiver? Yes   Is the patient having any side effects they believe may be caused by any medication additions or changes? No   Does the patient have all medications ordered at discharge? Yes   Is the patient taking all medications as directed (includes completed medication regime)? Yes   Comments PCP hospital f/u on 7/12/24 at 1:00 PM with ASHANTI Condon.   Does the patient have an appointment with their PCP within 7-14 days of discharge? Yes   Has home health visited the patient within 72 hours of discharge? N/A   Psychosocial issues? No   Comments Patient states he is totally fine now. No abdominal pain or issues. He states he called and scheduled all his f/u appts.   Did the patient receive a copy of their discharge instructions? Yes   Nursing interventions Reviewed instructions with patient   What is the patient's perception of their health status since discharge? Returned to baseline/stable   Is the patient/caregiver able to teach back signs and symptoms related to disease process for when to call PCP? Yes   Is the patient/caregiver able to teach back signs and symptoms related to disease process for when to call 911? Yes   Is the patient/caregiver able to teach back the hierarchy of who to call/visit for symptoms/problems? PCP, Specialist, Home health nurse, Urgent Care, ED, 911 Yes   If the patient is a current smoker, are they able to teach back resources  for cessation? Not a smoker   TCM call completed? Yes   Call end time 1711   Would this patient benefit from a Referral to [facility] Social Work? No   Is the patient interested in additional calls from an ambulatory ? No            Cathy Oleary RN    7/5/2024, 17:11 EDT

## 2024-07-05 NOTE — TELEPHONE ENCOUNTER
Pt called and stated that he recently went to ED due to pancreatitis flare up. He was transported to  and received scans/labs (viewable to us). He says that he was advised to followup with GI and/or hepatology. He has an appointment in December with Dr Nguyễn but would like to know if timetable should be moved up or if there is any change in treatment plan. He states that at this time he is feeling better following ED treatment, however is still concerned.

## 2024-07-10 ENCOUNTER — OFFICE VISIT (OUTPATIENT)
Dept: GASTROENTEROLOGY | Facility: CLINIC | Age: 39
End: 2024-07-10
Payer: COMMERCIAL

## 2024-07-10 VITALS
OXYGEN SATURATION: 99 % | HEART RATE: 67 BPM | WEIGHT: 227 LBS | SYSTOLIC BLOOD PRESSURE: 110 MMHG | BODY MASS INDEX: 33.52 KG/M2 | DIASTOLIC BLOOD PRESSURE: 78 MMHG

## 2024-07-10 DIAGNOSIS — Z87.19 HISTORY OF PANCREATITIS: ICD-10-CM

## 2024-07-10 DIAGNOSIS — E78.1 HYPERTRIGLYCERIDEMIA: Primary | ICD-10-CM

## 2024-07-11 ENCOUNTER — TELEPHONE (OUTPATIENT)
Dept: GASTROENTEROLOGY | Facility: CLINIC | Age: 39
End: 2024-07-11
Payer: COMMERCIAL

## 2024-07-11 ENCOUNTER — LAB (OUTPATIENT)
Dept: LAB | Facility: HOSPITAL | Age: 39
End: 2024-07-11
Payer: COMMERCIAL

## 2024-07-11 LAB
CHOLEST SERPL-MCNC: 150 MG/DL (ref 0–200)
HDLC SERPL-MCNC: 25 MG/DL (ref 40–60)
LDLC SERPL CALC-MCNC: 62 MG/DL (ref 0–100)
LDLC/HDLC SERPL: 1.74 {RATIO}
TRIGL SERPL-MCNC: 408 MG/DL (ref 0–150)
VLDLC SERPL-MCNC: 63 MG/DL (ref 5–40)

## 2024-07-11 PROCEDURE — 36415 COLL VENOUS BLD VENIPUNCTURE: CPT | Performed by: INTERNAL MEDICINE

## 2024-07-11 PROCEDURE — 80061 LIPID PANEL: CPT | Performed by: INTERNAL MEDICINE

## 2024-07-11 NOTE — TELEPHONE ENCOUNTER
----- Message from Cecily CHO sent at 7/11/2024 12:17 PM EDT -----    ----- Message -----  From: Brenda Nguyễn MD  Sent: 7/11/2024  12:08 PM EDT  To: Cecily Le MA    Yes.  Do not think he needs a hepatology appointment at the moment.  ----- Message -----  From: Cecily Le MA  Sent: 7/11/2024   9:02 AM EDT  To: Brenda Nguyễn MD    Patient called today. He wants to know if it is ok to cancel the hepatology appointment with ? He said he thought you all got everything figured out at his office visit. Please advise.

## 2024-07-12 ENCOUNTER — OFFICE VISIT (OUTPATIENT)
Dept: INTERNAL MEDICINE | Facility: CLINIC | Age: 39
End: 2024-07-12
Payer: COMMERCIAL

## 2024-07-12 VITALS
TEMPERATURE: 98 F | HEIGHT: 69 IN | BODY MASS INDEX: 33.92 KG/M2 | WEIGHT: 229 LBS | SYSTOLIC BLOOD PRESSURE: 104 MMHG | HEART RATE: 73 BPM | OXYGEN SATURATION: 97 % | DIASTOLIC BLOOD PRESSURE: 65 MMHG

## 2024-07-12 DIAGNOSIS — E78.1 HYPERTRIGLYCERIDEMIA: ICD-10-CM

## 2024-07-12 DIAGNOSIS — K76.9 HEPATOCELLULAR INJURY: ICD-10-CM

## 2024-07-12 DIAGNOSIS — K85.90 ACUTE PANCREATITIS WITHOUT INFECTION OR NECROSIS, UNSPECIFIED PANCREATITIS TYPE: ICD-10-CM

## 2024-07-12 DIAGNOSIS — Z09 HOSPITAL DISCHARGE FOLLOW-UP: Primary | ICD-10-CM

## 2024-07-12 DIAGNOSIS — L40.50 PSORIATIC ARTHRITIS: ICD-10-CM

## 2024-07-12 PROCEDURE — 99495 TRANSJ CARE MGMT MOD F2F 14D: CPT | Performed by: NURSE PRACTITIONER

## 2024-07-12 NOTE — PROGRESS NOTES
Please give the patient the following message:  ----- Results -----  The triglyceride level has increased, so I would go back on the fibrate.  Would start out taking it every other day however for at least 2 weeks.

## 2024-07-12 NOTE — PROGRESS NOTES
Transitional Care Follow Up Visit  Subjective     Ciera Stone Gibson is a 39 y.o. male who presents for a transitional care management visit.    Within 48 business hours after discharge our office contacted him via telephone to coordinate his care and needs.      I reviewed and discussed the details of that call along with the discharge summary, hospital problems, inpatient lab results, inpatient diagnostic studies, and consultation reports with Ciera.     Current outpatient and discharge medications have been reconciled for the patient.  Reviewed by: ASHANTI Barreto          7/4/2024     5:26 PM   Date of TCM Phone Call   [facility]   Date of Admission 7/2/2024   Date of Discharge 7/4/2024   Discharge Disposition Home or Self Care     Risk for Readmission (LACE) No data recorded    History of Present Illness   Course During Hospital Stay:  presented to  ED after being transferred from [facility] due to significantly elevated transaminases in addition to pancreatitis. He was complaining of abdominal discomfort that had worsened over a 4 day period in addition to shortness of breath due to pressure on the diaphragm. Initial ER visit CT showed pancreatitis but labs were unremarkable, discharged home with pain management. Represented due to intolerable pain and LFTs significantly elevated. Transferred to  due to concerns for choledocholithiasis or mass effect from pseudocyst. MRCP performed and showed previous stone had resolved. After this LFTs began to trend down. Atorvastatin and fenofibrate were held due to this. Was encouraged to follow-up with GI , hepatology, and PCP.      Since discharge he is feeling much better. He followed up with GI 2 days ago and planning to repeat MRI August 2nd with contrast. His abdominal pain has resolved as well as nausea, vomiting, and dark urine. He has no yellowing of eyes or skin. He did have fasting lipid panel and triglycerides back up to over 400. GI confident this was  "choledocholithiasis. LFTs have not been recheck albeit trending down prior to discharge. He remains off of statin and fenofibrate. He continues to follow the Mediterranean diet. He does suffer from psoriatic arthritis, GI did refer him to re-establish with rheumatology. Currently holding enbrel.   No acute complaints or concerns today.     The following portions of the patient's history were reviewed and updated as appropriate: allergies, current medications, past family history, past medical history, past social history, past surgical history, and problem list.      Objective   /65   Pulse 73   Temp 98 °F (36.7 °C) (Tympanic)   Ht 175.3 cm (69\")   Wt 104 kg (229 lb)   SpO2 97%   BMI 33.82 kg/m²   Physical Exam  Vitals and nursing note reviewed.   Constitutional:       General: He is not in acute distress.     Appearance: Normal appearance. He is obese. He is not toxic-appearing.   Eyes:      Pupils: Pupils are equal, round, and reactive to light.   Neck:      Vascular: No carotid bruit.   Cardiovascular:      Rate and Rhythm: Normal rate and regular rhythm.      Heart sounds: Normal heart sounds. No murmur heard.  Pulmonary:      Effort: Pulmonary effort is normal. No respiratory distress.      Breath sounds: Normal breath sounds. No wheezing.   Abdominal:      General: Bowel sounds are normal. There is no distension.      Palpations: Abdomen is soft.      Tenderness: There is no abdominal tenderness.   Musculoskeletal:      Cervical back: Neck supple. No tenderness.   Skin:     General: Skin is warm and dry.      Findings: No rash.   Neurological:      General: No focal deficit present.      Mental Status: He is alert.   Psychiatric:         Mood and Affect: Mood normal.         Behavior: Behavior normal.         Assessment & Plan   Diagnoses and all orders for this visit:    1. Hospital discharge follow-up (Primary)  -     Comprehensive metabolic panel  Notes, imaging, and labs reviewed and discussed " today.   2. Acute pancreatitis without infection or necrosis, unspecified pancreatitis type  -     Comprehensive metabolic panel  Resolved. Restart fenofibrate and statin. Continue dietary modification and ETOH avoidance. Proceed with scheduled MRI with contrast in August.   3. Hepatocellular injury  Suspect from choledocholithiasis, repeat CMP in 1 week. OK to restart previous medications low suspicion this was related to medication. Monitor for jaundice, abdominal pain, dark colored urine.   4. Hypertriglyceridemia  See above plan. Restart fenofibrate, recheck CMP in 1 week. Continue diet and ETOH avoidance.   6. Psoriatic arthritis  Agree with rheumatology referral. Continue holding enbrel.

## 2024-07-29 PROBLEM — Z79.899 IMMUNOSUPPRESSION DUE TO DRUG THERAPY: Status: ACTIVE | Noted: 2024-07-29

## 2024-07-29 PROBLEM — D84.821 IMMUNOSUPPRESSION DUE TO DRUG THERAPY: Status: ACTIVE | Noted: 2024-07-29

## 2024-07-29 PROBLEM — L40.9 PSORIASIS: Status: ACTIVE | Noted: 2024-07-29

## 2024-07-29 NOTE — ASSESSMENT & PLAN NOTE
Onset approximately 2013.  Involving mainly the knees and ankles.  Long-standing psoriasis.  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 Otezla helped but some depressive side effects. Humira restarted 8/19. Humira stopped 11/19 for loss of efficacy. Enbrel started 11/19..    Doing well. No joint swelling and little pain. Skin is doing fairly well.  Tender joint count is 0, swollen joint count is 0, physician global is  1 , visual analog pain scale is 3 , pt global is 3 .  Prognosis is good.  RF given. Lab order given. Plan is q 3 month labs and monitoring visits.

## 2024-07-29 NOTE — PROGRESS NOTES
Office Follow Up      Date: 07/30/2024   Patient Name: Ciera Gibson  MRN: [id number]  YOB: 1985    Referring Physician: Tyra Bro APRN     Chief Complaint: Psoriatic arthritis    History of Present Illness: Ciera Gibson is a 39 y.o. male who is here today for follow up on psoriatic arthritis.  Joints are doing well.  Little pain and no swelling.   No infections or problems with injections.   Skin is doing well.  In interim had pancreatitis from cholecystectomy. Diagnosed with hereditary hypertriglyceridemia. On medication.    Lost 45 lbs with diet.     Pain scale: 3/10. The problem has not changed. The primary symptoms reported include: stiffness. The following symptoms are not reported:  pain and swelling. The patient assesses the interval disease activity as: . The patient's assessment of treatment is: helping greatly. The patient is not experiencing side effects. The locations affected since last visit are bilateral ankle. Associated symptoms include AM stiffness (30 Minutes). Pertinent negatives include fever, infection, fatigue, eye symptoms, change in vision, sicca complaints, skin lesion(s), chest pain, changing cough, edema, joint swelling and abdominal pain.     Subjective   Review of Systems: Review of Systems   Constitutional:  Negative for chills, fatigue, fever and unexpected weight loss.   HENT:  Negative for dental problem, mouth sores, sinus pressure and swollen glands.    Eyes:  Negative for photophobia, pain and redness.   Respiratory:  Negative for apnea, cough, chest tightness and shortness of breath.    Cardiovascular:  Negative for chest pain and leg swelling.   Gastrointestinal:  Negative for abdominal pain, diarrhea, nausea and GERD.   Genitourinary:  Negative for dysuria and hematuria.   Skin:  Negative for dry skin, rash, skin lesions and bruise.   Neurological:  Negative for dizziness, seizures, syncope, weakness, headache and memory problem.  "  Hematological:  Negative for adenopathy. Does not bruise/bleed easily.   Psychiatric/Behavioral:  Negative for sleep disturbance, suicidal ideas, depressed mood and stress. The patient is not nervous/anxious.    All other systems reviewed and are negative.       Medications:   Current Outpatient Medications:     acetaminophen (TYLENOL) 500 MG tablet, Take 1 tablet by mouth Every 6 (Six) Hours As Needed for Mild Pain., Disp: 30 tablet, Rfl: 0    atorvastatin (LIPITOR) 80 MG tablet, Take 1 tablet by mouth Every Night, Disp: 90 tablet, Rfl: 3    clobetasol propionate (TEMOVATE) 0.05 % cream, Apply 1 Application topically to the appropriate area as directed 2 (Two) Times a Day., Disp: , Rfl:     Enbrel SureClick 50 MG/ML solution auto-injector, Inject 1 mL under the skin into the appropriate area as directed 1 (One) Time Per Week., Disp: 4 each, Rfl: 3    fenofibrate (TRICOR) 145 MG tablet, Take 1 tablet by mouth Daily, Disp: 90 tablet, Rfl: 3    fluocinolone 0.01 % cream, Apply 1 Application topically to the appropriate area as directed 2 (Two) Times a Day., Disp: , Rfl:     halobetasol (ULTRAVATE) 0.05 % cream, Apply 1 Application topically to the appropriate area as directed. AS DIRECTED, Disp: , Rfl:     Allergies: No Known Allergies    I have reviewed and updated the patient's chief complaint, history of present illness, review of systems, past medical history, surgical history, family history, social history, medications and allergy list as appropriate.     Objective    Vital Signs:   Vitals:    07/30/24 1031   BP: 128/76   Pulse: 65   Temp: 98 °F (36.7 °C)   Weight: 105 kg (230 lb 11.2 oz)   Height: 175.3 cm (69.02\")   PainSc:   3   PainLoc: Knee  Comment: Bilateral/ Ankles     Body mass index is 34.05 kg/m².    Physical Exam:    GENERAL: The patient is well-developed and well-nourished.  Cooperative and oriented ×3.  Affect is normal.  Hydration appears normal.  HEENT: Normocephalic and atraumatic.  No notable " alopecia.  No facial rash.  Lids and conjunctiva are normal.  Pupils are equal and sclera are clear.  I see no oral or nasal ulcers.  Lips teeth and gums are within normal limits.  Oropharynx is clear.  NECK: Neck is supple without adenopathy, masses, or thyromegaly.  CARDIOVASCULAR: Normal S1, S2.  No murmurs are heard.  LUNGS: Clear to auscultation and percussion.  ABDOMEN: Soft and nontender without hepatosplenomegaly.  EXTREMITIES: No edema.  No cyanosis or clubbing.    SKIN: Psoriatic plaques on elbows.    NEUROLOGIC: Gait is normal.  MUSCULOSKELETAL: Complete joint exam was performed.  There is full range of motion of the shoulders, wrists, and hands without notable deformities, soft tissue swelling, synovitis, or atrophy.  There is a flexion contracture of the right elbow.  Hips have good flexion and internal and external rotation. Knees have no effusions. Knees with crepitus.  There is full flexion and full extension of the knees without pain.  Ankles  have no soft tissue swelling or synovitis or major deformities.  BACK: Straight without notable scoliosis.  Joint Exam 07/30/2024     The following joints were examined and normal:   Left Glenohumeral, Right Glenohumeral, Left Elbow, Right Elbow, Left Wrist, Right Wrist, Left MCP 1, Right MCP 1, Left MCP 2, Right MCP 2, Left MCP 3, Right MCP 3, Left MCP 4, Right MCP 4, Left MCP 5, Right MCP 5, Left IP (thumb), Right IP (thumb), Left PIP 2 (finger), Right PIP 2 (finger), Left PIP 3 (finger), Right PIP 3 (finger), Left PIP 4 (finger), Right PIP 4 (finger), Left PIP 5 (finger), Right PIP 5 (finger), Left Knee, Right Knee       Patient Global: 3 / 100  Provider Global: 1 / 100      Assessment / Plan      Assessment & Plan  Psoriatic arthritis  Onset approximately 2013.  Involving mainly the knees and ankles.  Long-standing psoriasis.  Humira used with good success but had to be stopped due to inability to be immunosuppressed if needed to be deployed for the  . Otezla helped but some depressive side effects. Humira restarted 8/19. Humira stopped 11/19 for loss of efficacy. Enbrel started 11/19..    Doing well. No joint swelling and little pain. Skin is doing fairly well.  Tender joint count is 0, swollen joint count is 0, physician global is  1 , visual analog pain scale is 3 , pt global is 3 .  Prognosis is good.  RF given. Lab order given. Plan is q 3 month labs and monitoring visits.  Psoriasis  Better  Immunosuppression due to drug therapy  Enbrel.  No infections.    Orders Placed This Encounter   Procedures    C-reactive Protein    Comprehensive Metabolic Panel    CBC Auto Differential    Sedimentation Rate     New Medications Ordered This Visit   Medications    Enbrel SureClick 50 MG/ML solution auto-injector     Sig: Inject 1 mL under the skin into the appropriate area as directed 1 (One) Time Per Week.     Dispense:  4 each     Refill:  3          Follow Up:   Return in about 3 months (around 10/30/2024).        Tristan Talbert MD  [facility] [place] [facility] [place]

## 2024-07-30 ENCOUNTER — OFFICE VISIT (OUTPATIENT)
Age: 39
End: 2024-07-30
Payer: COMMERCIAL

## 2024-07-30 ENCOUNTER — LAB (OUTPATIENT)
Facility: HOSPITAL | Age: 39
End: 2024-07-30
Payer: COMMERCIAL

## 2024-07-30 VITALS
HEART RATE: 65 BPM | DIASTOLIC BLOOD PRESSURE: 76 MMHG | WEIGHT: 230.7 LBS | TEMPERATURE: 98 F | SYSTOLIC BLOOD PRESSURE: 128 MMHG | BODY MASS INDEX: 34.17 KG/M2 | HEIGHT: 69 IN

## 2024-07-30 DIAGNOSIS — L40.50 PSORIATIC ARTHRITIS: ICD-10-CM

## 2024-07-30 DIAGNOSIS — L40.50 PSORIATIC ARTHRITIS: Primary | ICD-10-CM

## 2024-07-30 DIAGNOSIS — Z79.899 IMMUNOSUPPRESSION DUE TO DRUG THERAPY: ICD-10-CM

## 2024-07-30 DIAGNOSIS — L40.9 PSORIASIS: ICD-10-CM

## 2024-07-30 DIAGNOSIS — D84.821 IMMUNOSUPPRESSION DUE TO DRUG THERAPY: ICD-10-CM

## 2024-07-30 LAB
ALBUMIN SERPL-MCNC: 4.8 G/DL (ref 3.5–5.2)
ALBUMIN/GLOB SERPL: 1.7 G/DL
ALP SERPL-CCNC: 69 U/L (ref 39–117)
ALT SERPL W P-5'-P-CCNC: 34 U/L (ref 1–41)
ANION GAP SERPL CALCULATED.3IONS-SCNC: 11 MMOL/L (ref 5–15)
AST SERPL-CCNC: 23 U/L (ref 1–40)
BILIRUB SERPL-MCNC: 0.3 MG/DL (ref 0–1.2)
BUN SERPL-MCNC: 21 MG/DL (ref 6–20)
BUN/CREAT SERPL: 18.9 (ref 7–25)
CALCIUM SPEC-SCNC: 9.8 MG/DL (ref 8.6–10.5)
CHLORIDE SERPL-SCNC: 106 MMOL/L (ref 98–107)
CO2 SERPL-SCNC: 25 MMOL/L (ref 22–29)
CREAT SERPL-MCNC: 1.11 MG/DL (ref 0.76–1.27)
CRP SERPL-MCNC: <0.3 MG/DL (ref 0–0.5)
DEPRECATED RDW RBC AUTO: 40.2 FL (ref 37–54)
EGFRCR SERPLBLD CKD-EPI 2021: 86.1 ML/MIN/1.73
ERYTHROCYTE [DISTWIDTH] IN BLOOD BY AUTOMATED COUNT: 12.5 % (ref 12.3–15.4)
GLOBULIN UR ELPH-MCNC: 2.9 GM/DL
GLUCOSE SERPL-MCNC: 96 MG/DL (ref 65–99)
HCT VFR BLD AUTO: 38.8 % (ref 37.5–51)
HGB BLD-MCNC: 12.9 G/DL (ref 13–17.7)
MCH RBC QN AUTO: 29.8 PG (ref 26.6–33)
MCHC RBC AUTO-ENTMCNC: 33.2 G/DL (ref 31.5–35.7)
MCV RBC AUTO: 89.6 FL (ref 79–97)
NRBC BLD AUTO-RTO: 0 /100 WBC (ref 0–0.2)
PLATELET # BLD AUTO: 259 10*3/MM3 (ref 140–450)
PMV BLD AUTO: 10 FL (ref 6–12)
POTASSIUM SERPL-SCNC: 4.5 MMOL/L (ref 3.5–5.2)
PROT SERPL-MCNC: 7.7 G/DL (ref 6–8.5)
RBC # BLD AUTO: 4.33 10*6/MM3 (ref 4.14–5.8)
SODIUM SERPL-SCNC: 142 MMOL/L (ref 136–145)
WBC NRBC COR # BLD AUTO: 6.56 10*3/MM3 (ref 3.4–10.8)

## 2024-07-30 PROCEDURE — 99214 OFFICE O/P EST MOD 30 MIN: CPT | Performed by: INTERNAL MEDICINE

## 2024-07-30 PROCEDURE — 85025 COMPLETE CBC W/AUTO DIFF WBC: CPT

## 2024-07-30 PROCEDURE — 85652 RBC SED RATE AUTOMATED: CPT

## 2024-07-30 PROCEDURE — 80053 COMPREHEN METABOLIC PANEL: CPT

## 2024-07-30 PROCEDURE — 36415 COLL VENOUS BLD VENIPUNCTURE: CPT

## 2024-07-30 PROCEDURE — 86140 C-REACTIVE PROTEIN: CPT

## 2024-07-30 PROCEDURE — 85007 BL SMEAR W/DIFF WBC COUNT: CPT

## 2024-07-30 RX ORDER — MEDROXYPROGESTERONE ACETATE 150 MG/ML
1 INJECTION, SUSPENSION INTRAMUSCULAR WEEKLY
Qty: 4 EACH | Refills: 3 | Status: SHIPPED | OUTPATIENT
Start: 2024-07-30

## 2024-07-31 LAB
BASOPHILS # BLD MANUAL: 0 10*3/MM3 (ref 0–0.2)
BASOPHILS NFR BLD MANUAL: 0 % (ref 0–1.5)
BURR CELLS BLD QL SMEAR: ABNORMAL
EOSINOPHIL # BLD MANUAL: 0.07 10*3/MM3 (ref 0–0.4)
EOSINOPHIL NFR BLD MANUAL: 1 % (ref 0.3–6.2)
ERYTHROCYTE [SEDIMENTATION RATE] IN BLOOD: 10 MM/HR (ref 0–15)
LYMPHOCYTES # BLD MANUAL: 3.95 10*3/MM3 (ref 0.7–3.1)
LYMPHOCYTES NFR BLD MANUAL: 2 % (ref 5–12)
MONOCYTES # BLD: 0.13 10*3/MM3 (ref 0.1–0.9)
NEUTROPHILS # BLD AUTO: 2.41 10*3/MM3 (ref 1.7–7)
NEUTROPHILS NFR BLD MANUAL: 36.7 % (ref 42.7–76)
PLAT MORPH BLD: NORMAL
POIKILOCYTOSIS BLD QL SMEAR: ABNORMAL
POLYCHROMASIA BLD QL SMEAR: ABNORMAL
VARIANT LYMPHS NFR BLD MANUAL: 60.2 % (ref 19.6–45.3)
WBC MORPH BLD: NORMAL

## 2024-08-02 ENCOUNTER — HOSPITAL ENCOUNTER (OUTPATIENT)
Dept: MRI IMAGING | Facility: HOSPITAL | Age: 39
Discharge: HOME | End: 2024-08-02
Admitting: INTERNAL MEDICINE
Payer: COMMERCIAL

## 2024-08-02 DIAGNOSIS — E78.2 MIXED HYPERLIPIDEMIA: ICD-10-CM

## 2024-08-02 DIAGNOSIS — Z87.19 HISTORY OF PANCREATITIS: ICD-10-CM

## 2024-08-02 DIAGNOSIS — E78.1 HYPERTRIGLYCERIDEMIA: ICD-10-CM

## 2024-08-02 PROCEDURE — 0 GADOBENATE DIMEGLUMINE 529 MG/ML SOLUTION: Performed by: INTERNAL MEDICINE

## 2024-08-02 PROCEDURE — A9577 INJ MULTIHANCE: HCPCS | Performed by: INTERNAL MEDICINE

## 2024-08-02 PROCEDURE — 74183 MRI ABD W/O CNTR FLWD CNTR: CPT

## 2024-08-02 RX ADMIN — GADOBENATE DIMEGLUMINE 20 ML: 529 INJECTION, SOLUTION INTRAVENOUS at 07:27

## 2024-08-02 NOTE — PROGRESS NOTES
Please give the patient the following message:  ----- Results -----  Normal appearance of the pancreas.  No evidence of pancreatitis or pseudocyst.  No evidence of stone disease.

## 2024-08-22 ENCOUNTER — OFFICE VISIT (OUTPATIENT)
Dept: INTERNAL MEDICINE | Facility: CLINIC | Age: 39
End: 2024-08-22
Payer: COMMERCIAL

## 2024-08-22 VITALS
RESPIRATION RATE: 20 BRPM | BODY MASS INDEX: 34.96 KG/M2 | OXYGEN SATURATION: 98 % | HEIGHT: 69 IN | TEMPERATURE: 97.3 F | DIASTOLIC BLOOD PRESSURE: 65 MMHG | SYSTOLIC BLOOD PRESSURE: 119 MMHG | WEIGHT: 236 LBS | HEART RATE: 72 BPM

## 2024-08-22 DIAGNOSIS — D64.9 ANEMIA, UNSPECIFIED TYPE: ICD-10-CM

## 2024-08-22 DIAGNOSIS — L40.50 PSORIATIC ARTHRITIS: ICD-10-CM

## 2024-08-22 DIAGNOSIS — Z00.00 ANNUAL PHYSICAL EXAM: Primary | ICD-10-CM

## 2024-08-22 DIAGNOSIS — E78.1 HYPERTRIGLYCERIDEMIA: ICD-10-CM

## 2024-08-22 PROCEDURE — 99396 PREV VISIT EST AGE 40-64: CPT | Performed by: NURSE PRACTITIONER

## 2024-08-22 NOTE — PROGRESS NOTES
Subjective   Cierazach Gibson is a 39 y.o. male and is here for a comprehensive physical exam. The patient reports no problems.    HPI:  Here for annual exam.  Hypertriglyceridemia: has restarted the atorvastatin and fenofibrate due to last labs showing triglycerides trending back up. Endorses he tolerates these well. Denies muscle pain and dark urine. Following with gastroenterology. Recent abdominal MRI with contrast was normal including spleen imaging.   Lab Results   Component Value Date    CHOL 150 07/11/2024    TRIG 408 (H) 07/11/2024    HDL 25 (L) 07/11/2024    LDL 62 07/11/2024   He has had some anemia but that appears to be slowly improving.  Overall he feels well and denies any chest pain, shortness of breath, pale skin, or activity intolerance.  He has never had any B12 or iron deficiency.  He has no blood in his stool or urine.      Psoriasis: is compliant with Enbrel. Feels like his psoriasis is improving. Following with rheumatology.   GETACHEW: has not been wearing CPAP, does feel fatigued during the day and says he is not sleeping well. Endorses he does plan on wearing his CPAP again starting this week.   No acute complaints.     Health Habits:  Eye exam within last 2 years? Yes.  Dental exam every 6 months? No.  Exercise habits: No structured regimen.  Healthy diet? Lean meats, vegetables, and fruits. Only drinking water.     The 10-year ASCVD risk score (Ankit HERNANDEZ, et al., 2019) is: 1.5%    Values used to calculate the score:      Age: 39 years      Sex: Male      Is Non- : No      Diabetic: No      Tobacco smoker: No      Systolic Blood Pressure: 119 mmHg      Is BP treated: No      HDL Cholesterol: 25 mg/dL      Total Cholesterol: 150 mg/dL      Do you take any herbs or supplements that were not prescribed by a doctor? no  Are you taking calcium supplements? No  Are you taking aspirin daily? No     History:  Patient receives prostate care here: Yes  He reports No decrease in  urinary stream,  Some nocturia, No dribbling, No hesitancy.    Family history of prostate cancer: no.     reports being sexually active and has had partner(s) who are female. He reports using the following method of birth control/protection: I.U.D..    The following portions of the patient's history were reviewed and updated as appropriate: He  has a past medical history of Acute pancreatitis, Bladder mass (2016), Gall stones, High triglycerides, Psoriasis, Psoriatic arthritis, and Sleep apnea.  He does not have any pertinent problems on file.  He  has a past surgical history that includes Bladder surgery; Hand surgery (Left); Tonsillectomy; Adenoidectomy; Colonoscopy; and Cholecystectomy (N/A, 5/22/2024).  His family history includes Heart disease in his father and mother; Hypertension in his mother; Stroke in his mother.  He  reports that he quit smoking about 19 years ago. His smoking use included cigarettes. He started smoking about 25 years ago. He has a 12.3 pack-year smoking history. He has been exposed to tobacco smoke. He has never used smokeless tobacco. He reports that he does not currently use alcohol. He reports that he does not use drugs.  Current Outpatient Medications   Medication Sig Dispense Refill    acetaminophen (TYLENOL) 500 MG tablet Take 1 tablet by mouth Every 6 (Six) Hours As Needed for Mild Pain. 30 tablet 0    atorvastatin (LIPITOR) 80 MG tablet Take 1 tablet by mouth Every Night 90 tablet 3    clobetasol propionate (TEMOVATE) 0.05 % cream Apply 1 Application topically to the appropriate area as directed 2 (Two) Times a Day.      Enbrel SureClick 50 MG/ML solution auto-injector Inject 1 mL under the skin into the appropriate area as directed 1 (One) Time Per Week. 4 each 3    fenofibrate (TRICOR) 145 MG tablet Take 1 tablet by mouth Daily 90 tablet 3    fluocinolone 0.01 % cream Apply 1 Application topically to the appropriate area as directed 2 (Two) Times a Day.      halobetasol  "(ULTRAVATE) 0.05 % cream Apply 1 Application topically to the appropriate area as directed. AS DIRECTED       No current facility-administered medications for this visit.       Review of Systems  Do you have pain that bothers you in your daily life? no    Objective   /65 (BP Location: Right arm, Patient Position: Sitting, Cuff Size: Large Adult)   Pulse 72   Temp 97.3 °F (36.3 °C)   Resp 20   Ht 175.3 cm (69\")   Wt 107 kg (236 lb)   SpO2 98%   BMI 34.85 kg/m²     Physical Exam  Vitals and nursing note reviewed.   Constitutional:       General: He is not in acute distress.     Appearance: Normal appearance. He is obese. He is not ill-appearing, toxic-appearing or diaphoretic.   HENT:      Head: Normocephalic and atraumatic.      Right Ear: Tympanic membrane, ear canal and external ear normal.      Left Ear: Tympanic membrane, ear canal and external ear normal.      Nose: Nose normal.      Mouth/Throat:      Mouth: Mucous membranes are moist.      Pharynx: No posterior oropharyngeal erythema.   Eyes:      General: No scleral icterus.     Extraocular Movements: Extraocular movements intact.      Right eye: No nystagmus.      Left eye: No nystagmus.      Conjunctiva/sclera: Conjunctivae normal.      Pupils: Pupils are equal, round, and reactive to light.   Neck:      Thyroid: No thyroid mass or thyromegaly.      Vascular: No carotid bruit.   Cardiovascular:      Rate and Rhythm: Normal rate and regular rhythm.      Pulses: Normal pulses.      Heart sounds: Normal heart sounds. No murmur heard.  Pulmonary:      Effort: Pulmonary effort is normal.      Breath sounds: Normal breath sounds. No wheezing.   Abdominal:      General: Bowel sounds are normal. There is no distension.      Palpations: Abdomen is soft. There is no mass.      Tenderness: There is no abdominal tenderness. There is no guarding or rebound.      Hernia: No hernia is present.   Musculoskeletal:         General: Deformity present. No " tenderness.      Cervical back: Normal range of motion and neck supple. No muscular tenderness.      Right lower leg: No edema.      Left lower leg: No edema.   Lymphadenopathy:      Head:      Right side of head: No submandibular, tonsillar, preauricular or posterior auricular adenopathy.      Left side of head: No submandibular, tonsillar, preauricular or posterior auricular adenopathy.      Cervical: No cervical adenopathy.   Skin:     General: Skin is warm and dry.      Capillary Refill: Capillary refill takes less than 2 seconds.      Findings: No lesion or rash.   Neurological:      General: No focal deficit present.      Mental Status: He is alert and oriented to person, place, and time.      Coordination: Coordination is intact.      Gait: Gait is intact.      Deep Tendon Reflexes: Reflexes normal.   Psychiatric:         Mood and Affect: Mood normal.         Behavior: Behavior normal.         Thought Content: Thought content normal.         Judgment: Judgment normal.       Assessment & Plan   Healthy male exam.     1.    Diagnosis Plan   1. Annual physical exam  Preventative maintenance discussed during visit.  Encouraged annual flu vaccination.      2. Anemia, unspecified type  CBC No Differential    Comprehensive metabolic panel    Triglycerides    Vitamin B12    Folate    Iron and TIBC    Ferritin    Will continue to monitor, he is not symptomatic at this point in time, labs are stable. Recheck labs in 3 months prior to next visit.       3. Hypertriglyceridemia  CBC No Differential    Comprehensive metabolic panel    Triglycerides    Vitamin B12    Folate    Iron and TIBC    Ferritin    Continue medications as prescribed. Encouraged mediterranean diet and moderate intensity exercise 4-5 times per week. Encouraged to keep follow up with gastroenterology.  ER with any symptoms of pancreatitis.      4. Psoriatic arthritis  CBC No Differential    Comprehensive metabolic panel    Triglycerides    Vitamin B12     Folate    Iron and TIBC    Ferritin    Continue medications as prescribed. Encouraged to keep follow up with rheumatology.           2. Patient Counseling:  --Nutrition: Stressed importance of moderation in sodium/caffeine intake, saturated fat and cholesterol, caloric balance, sufficient intake of fresh fruits, vegetables, fiber, calcium and iron.  --Discussed the issue of calcium supplement, and the daily use of baby aspirin if applicable.  --Exercise: Stressed the importance of regular exercise.   --Substance Abuse: Discussed cessation/primary prevention of tobacco (if applicable, alcohol, or other drug use (if applicable); driving or other dangerous activities under the influence; availability of treatment for abuse.    --Sexuality: Discussed sexually transmitted diseases, partner selection, use of condoms, avoidance of unintended pregnancy  and contraceptive alternatives.   --Injury prevention: Discussed safety belts, safety helmets, smoke detector, smoking near bedding or upholstery.   --Dental health: Discussed importance of regular tooth brushing, flossing, and dental visits.  --Immunizations reviewed.  --Discussed benefits of screening colonoscopy (if applicable).  --After hours service discussed with patient.    3. Discussed the patient's BMI with him.  The BMI is above average; BMI management plan is completed       4. Return in about 3 months (around 11/22/2024) for Next scheduled follow up.  This note accurately reflects work and decisions made by me.   ASHANTI Olsen Student/ASHANTI Condon  08/22/2024  15:26 EDT

## 2024-09-17 ENCOUNTER — OFFICE VISIT (OUTPATIENT)
Dept: GASTROENTEROLOGY | Facility: CLINIC | Age: 39
End: 2024-09-17
Payer: COMMERCIAL

## 2024-09-17 VITALS
HEART RATE: 68 BPM | OXYGEN SATURATION: 99 % | SYSTOLIC BLOOD PRESSURE: 128 MMHG | DIASTOLIC BLOOD PRESSURE: 78 MMHG | WEIGHT: 241 LBS | BODY MASS INDEX: 35.59 KG/M2

## 2024-09-17 DIAGNOSIS — Z87.19 HISTORY OF PANCREATITIS: ICD-10-CM

## 2024-09-17 DIAGNOSIS — E78.2 MIXED HYPERLIPIDEMIA: ICD-10-CM

## 2024-09-17 DIAGNOSIS — E78.1 HYPERTRIGLYCERIDEMIA: Primary | ICD-10-CM

## 2024-10-08 RX ORDER — MEDROXYPROGESTERONE ACETATE 150 MG/ML
INJECTION, SUSPENSION INTRAMUSCULAR
Qty: 4 EACH | Refills: 1 | Status: SHIPPED | OUTPATIENT
Start: 2024-10-08

## 2024-10-08 NOTE — TELEPHONE ENCOUNTER
Specialty Pharmacy Patient Management Program  Per Protocol Prescription Order/Refill     Patient currently fills medications at [facility] and is not enrolled in an Rheumatology Patient Management Program.     Requested Prescriptions     Signed Prescriptions Disp Refills    Enbrel SureClick 50 MG/ML solution auto-injector 4 each 1     Sig: INJECT 1 PEN UNDER THE SKIN EVERY 7 DAYS     Authorizing Provider: ARELY CARTER     Ordering User: LOUIS TURK     Prescription orders above were sent to the pharmacy per Collaborative Care Agreement Protocol.       Louis Turk PharmD, BCPS  Clinical Specialty Pharmacist, Rheumatology   10/8/2024  13:05 EDT

## 2024-10-31 PROBLEM — R53.83 FATIGUE: Status: ACTIVE | Noted: 2024-10-31

## 2024-10-31 NOTE — ASSESSMENT & PLAN NOTE
Onset approximately 2013.  Involving mainly the knees and ankles.  Long-standing psoriasis.  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 Otezla helped but some depressive side effects. Humira restarted 8/19. Humira stopped 11/19 for loss of efficacy. Enbrel started 11/19.    Doing well on Enbrel.   No joint swelling and little pain. Skin is doing fairly well.  Tender joint count is 0, swollen joint count is 0, physician global is  1, visual analog pain scale is 4, pt global is 3 .  Prognosis is good.  Lab order given.   Plan is q 3 month labs and monitoring visits.

## 2024-10-31 NOTE — PROGRESS NOTES
"                    Office Follow Up      Date: 11/05/2024   Patient Name: Ciera Gibson  MRN: [id number]  YOB: 1985    Referring Physician: Tyra Bro APRN     Chief Complaint: Psoriatic arthritis    History of Present Illness: Ciera Gibson is a 39 y.o. male who is here today for follow up on psoriatic arthritis.  Joints are doing well.  Little pain and no swelling.   No infections or problems with injections.   Skin is doing well.  7/2024 had pancreatitis from hypertriglyceridemia. Had cholecystectomy 28004 as well.    Has been working on weight loss and working on diet.     Pain scale: 4/10. AM stiffness (30 Minutes).   Subjective   Review of Systems: Negative per patient.    Medications:   Current Outpatient Medications:     atorvastatin (LIPITOR) 80 MG tablet, Take 1 tablet by mouth Every Night, Disp: 90 tablet, Rfl: 3    clobetasol propionate (TEMOVATE) 0.05 % cream, Apply 1 Application topically to the appropriate area as directed 2 (Two) Times a Day., Disp: , Rfl:     Enbrel SureClick 50 MG/ML solution auto-injector, INJECT 1 PEN UNDER THE SKIN EVERY 7 DAYS, Disp: 4 each, Rfl: 1    fenofibrate (TRICOR) 145 MG tablet, Take 1 tablet by mouth Daily, Disp: 90 tablet, Rfl: 3    fluocinolone 0.01 % cream, Apply 1 Application topically to the appropriate area as directed 2 (Two) Times a Day., Disp: , Rfl:     halobetasol (ULTRAVATE) 0.05 % cream, Apply 1 Application topically to the appropriate area as directed. AS DIRECTED, Disp: , Rfl:     Allergies: No Known Allergies    I have reviewed and updated the patient's chief complaint, history of present illness, review of systems, past medical history, surgical history, family history, social history, medications and allergy list as appropriate.     Objective    Vital Signs:   Vitals:    11/05/24 0901   BP: 122/66   BP Location: Left arm   Pulse: 74   Temp: 98 °F (36.7 °C)   Weight: 108 kg (239 lb)   Height: 175.3 cm (69\")   PainSc:   4 "       Body mass index is 35.29 kg/m².    Physical Exam:    GENERAL: The patient is well-developed and well-nourished.  Cooperative and oriented ×3.  Affect is normal.  Hydration appears normal.  HEENT: Normocephalic and atraumatic.  No notable alopecia.  No facial rash.  Lids and conjunctiva are normal.  Pupils are equal and sclera are clear.  I see no oral or nasal ulcers.  Lips teeth and gums are within normal limits.  Oropharynx is clear.  NECK: Neck is supple without adenopathy, masses, or thyromegaly.  CARDIOVASCULAR: Normal S1, S2.  No murmurs are heard.  LUNGS: Clear to auscultation  ABDOMEN: Not examined.  EXTREMITIES: No edema.  No cyanosis or clubbing.    SKIN: Psoriatic plaques on elbows.    NEUROLOGIC: Gait is normal.  MUSCULOSKELETAL: Complete joint exam was performed.  There is full range of motion of the shoulders, wrists, and hands without notable deformities, soft tissue swelling, synovitis, or atrophy.  There is a flexion contracture of the right elbow.  Hips have good flexion and internal and external rotation. Knees have no effusions. Knees with crepitus.  There is full flexion and full extension of the knees without pain.  Ankles  have no soft tissue swelling or synovitis or major deformities.  BACK: Straight without notable scoliosis.    Joint Exam 11/05/2024     The following joints were examined and normal:   Left Glenohumeral, Right Glenohumeral, Left Elbow, Right Elbow, Left Wrist, Right Wrist, Left MCP 1, Right MCP 1, Left MCP 2, Right MCP 2, Left MCP 3, Right MCP 3, Left MCP 4, Right MCP 4, Left MCP 5, Right MCP 5, Left IP (thumb), Right IP (thumb), Left PIP 2 (finger), Right PIP 2 (finger), Left PIP 3 (finger), Right PIP 3 (finger), Left PIP 4 (finger), Right PIP 4 (finger), Left PIP 5 (finger), Right PIP 5 (finger), Left Knee, Right Knee       Patient Global: 30 / 100  Provider Global: 10 / 100      Assessment / Plan      Assessment & Plan  Psoriatic arthritis  Onset approximately 2013.   Involving mainly the knees and ankles.  Long-standing psoriasis.  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 Otezla helped but some depressive side effects. Humira restarted 8/19. Humira stopped 11/19 for loss of efficacy. Enbrel started 11/19.    Doing well on Enbrel.   No joint swelling and little pain. Skin is doing fairly well.  Tender joint count is 0, swollen joint count is 0, physician global is  1, visual analog pain scale is 4, pt global is 3 .  Prognosis is good.  Lab order given.   Plan is q 3 month labs and monitoring visits.  Immunosuppression due to drug therapy  Enbrel.  QTB and hepatitis panel neg 10/17/23- update today  Fatigue, unspecified type  Update QTB and hepatitis panel today  Psoriasis  Better on Enbrel    Orders Placed This Encounter   Procedures    QuantiFERON-TB Gold Plus    Comprehensive Metabolic Panel    C-reactive Protein    Sedimentation Rate    CBC Auto Differential    Hepatitis Panel, Acute            Follow Up:   Return in about 3 months (around 2/5/2025) for NP, Dr. Talbert.      ASHANTI Bañuelos  [facility] [place] [facility] [place]

## 2024-11-05 ENCOUNTER — TELEPHONE (OUTPATIENT)
Age: 39
End: 2024-11-05

## 2024-11-05 ENCOUNTER — LAB (OUTPATIENT)
Facility: HOSPITAL | Age: 39
End: 2024-11-05
Payer: COMMERCIAL

## 2024-11-05 ENCOUNTER — SPECIALTY PHARMACY (OUTPATIENT)
Dept: PHARMACY | Facility: HOSPITAL | Age: 39
End: 2024-11-05
Payer: COMMERCIAL

## 2024-11-05 ENCOUNTER — OFFICE VISIT (OUTPATIENT)
Age: 39
End: 2024-11-05
Payer: COMMERCIAL

## 2024-11-05 VITALS
BODY MASS INDEX: 35.4 KG/M2 | SYSTOLIC BLOOD PRESSURE: 122 MMHG | TEMPERATURE: 98 F | HEIGHT: 69 IN | WEIGHT: 239 LBS | HEART RATE: 74 BPM | DIASTOLIC BLOOD PRESSURE: 66 MMHG

## 2024-11-05 DIAGNOSIS — R53.83 FATIGUE, UNSPECIFIED TYPE: ICD-10-CM

## 2024-11-05 DIAGNOSIS — D84.821 IMMUNOSUPPRESSION DUE TO DRUG THERAPY: ICD-10-CM

## 2024-11-05 DIAGNOSIS — L40.9 PSORIASIS: ICD-10-CM

## 2024-11-05 DIAGNOSIS — Z79.899 IMMUNOSUPPRESSION DUE TO DRUG THERAPY: ICD-10-CM

## 2024-11-05 DIAGNOSIS — L40.50 PSORIATIC ARTHRITIS: Primary | ICD-10-CM

## 2024-11-05 DIAGNOSIS — L40.50 PSORIATIC ARTHRITIS: ICD-10-CM

## 2024-11-05 LAB
ALBUMIN SERPL-MCNC: 4.6 G/DL (ref 3.5–5.2)
ALBUMIN/GLOB SERPL: 1.5 G/DL
ALP SERPL-CCNC: 55 U/L (ref 39–117)
ALT SERPL W P-5'-P-CCNC: 37 U/L (ref 1–41)
ANION GAP SERPL CALCULATED.3IONS-SCNC: 8.9 MMOL/L (ref 5–15)
AST SERPL-CCNC: 26 U/L (ref 1–40)
BASOPHILS # BLD MANUAL: 0.16 10*3/MM3 (ref 0–0.2)
BASOPHILS NFR BLD MANUAL: 2 % (ref 0–1.5)
BILIRUB SERPL-MCNC: 0.4 MG/DL (ref 0–1.2)
BUN SERPL-MCNC: 17 MG/DL (ref 6–20)
BUN/CREAT SERPL: 13.9 (ref 7–25)
CALCIUM SPEC-SCNC: 9.6 MG/DL (ref 8.6–10.5)
CHLORIDE SERPL-SCNC: 105 MMOL/L (ref 98–107)
CO2 SERPL-SCNC: 28.1 MMOL/L (ref 22–29)
CREAT SERPL-MCNC: 1.22 MG/DL (ref 0.76–1.27)
CRP SERPL-MCNC: <0.3 MG/DL (ref 0–0.5)
DEPRECATED RDW RBC AUTO: 41.7 FL (ref 37–54)
EGFRCR SERPLBLD CKD-EPI 2021: 76.9 ML/MIN/1.73
EOSINOPHIL # BLD MANUAL: 0.39 10*3/MM3 (ref 0–0.4)
EOSINOPHIL NFR BLD MANUAL: 5 % (ref 0.3–6.2)
ERYTHROCYTE [DISTWIDTH] IN BLOOD BY AUTOMATED COUNT: 12.8 % (ref 12.3–15.4)
ERYTHROCYTE [SEDIMENTATION RATE] IN BLOOD: 3 MM/HR (ref 0–15)
GLOBULIN UR ELPH-MCNC: 3.1 GM/DL
GLUCOSE SERPL-MCNC: 95 MG/DL (ref 65–99)
HAV IGM SERPL QL IA: NORMAL
HBV CORE IGM SERPL QL IA: NORMAL
HBV SURFACE AG SERPL QL IA: NORMAL
HCT VFR BLD AUTO: 38.9 % (ref 37.5–51)
HCV AB SER QL: NORMAL
HGB BLD-MCNC: 13.5 G/DL (ref 13–17.7)
LYMPHOCYTES # BLD MANUAL: 4.4 10*3/MM3 (ref 0.7–3.1)
LYMPHOCYTES NFR BLD MANUAL: 2 % (ref 5–12)
MCH RBC QN AUTO: 31.5 PG (ref 26.6–33)
MCHC RBC AUTO-ENTMCNC: 34.7 G/DL (ref 31.5–35.7)
MCV RBC AUTO: 90.7 FL (ref 79–97)
MONOCYTES # BLD: 0.16 10*3/MM3 (ref 0.1–0.9)
NEUTROPHILS # BLD AUTO: 2.75 10*3/MM3 (ref 1.7–7)
NEUTROPHILS NFR BLD MANUAL: 35 % (ref 42.7–76)
NRBC BLD AUTO-RTO: 0 /100 WBC (ref 0–0.2)
PLAT MORPH BLD: NORMAL
PLATELET # BLD AUTO: 292 10*3/MM3 (ref 140–450)
PMV BLD AUTO: 9.3 FL (ref 6–12)
POTASSIUM SERPL-SCNC: 4.5 MMOL/L (ref 3.5–5.2)
PROT SERPL-MCNC: 7.7 G/DL (ref 6–8.5)
RBC # BLD AUTO: 4.29 10*6/MM3 (ref 4.14–5.8)
RBC MORPH BLD: NORMAL
SODIUM SERPL-SCNC: 142 MMOL/L (ref 136–145)
VARIANT LYMPHS NFR BLD MANUAL: 50 % (ref 19.6–45.3)
VARIANT LYMPHS NFR BLD MANUAL: 6 % (ref 0–5)
WBC MORPH BLD: NORMAL
WBC NRBC COR # BLD AUTO: 7.86 10*3/MM3 (ref 3.4–10.8)

## 2024-11-05 PROCEDURE — 85025 COMPLETE CBC W/AUTO DIFF WBC: CPT

## 2024-11-05 PROCEDURE — 99214 OFFICE O/P EST MOD 30 MIN: CPT | Performed by: NURSE PRACTITIONER

## 2024-11-05 PROCEDURE — 86480 TB TEST CELL IMMUN MEASURE: CPT

## 2024-11-05 PROCEDURE — 36415 COLL VENOUS BLD VENIPUNCTURE: CPT

## 2024-11-05 PROCEDURE — 80074 ACUTE HEPATITIS PANEL: CPT

## 2024-11-05 PROCEDURE — 86140 C-REACTIVE PROTEIN: CPT

## 2024-11-05 PROCEDURE — 85007 BL SMEAR W/DIFF WBC COUNT: CPT

## 2024-11-05 PROCEDURE — 85652 RBC SED RATE AUTOMATED: CPT

## 2024-11-05 PROCEDURE — 80053 COMPREHEN METABOLIC PANEL: CPT

## 2024-11-05 RX ORDER — MEDROXYPROGESTERONE ACETATE 150 MG/ML
1 INJECTION, SUSPENSION INTRAMUSCULAR
Qty: 4 ML | Refills: 5 | Status: SHIPPED | OUTPATIENT
Start: 2024-11-05

## 2024-11-05 NOTE — TELEPHONE ENCOUNTER
Specialty Pharmacy Patient Management Program  Per Protocol Prescription Order/Refill     Patient currently fills medications at [facility] and is not enrolled in an Rheumatology Patient Management Program.     Requested Prescriptions     Signed Prescriptions Disp Refills    Enbrel SureClick 50 MG/ML solution auto-injector 4 mL 5     Sig: Inject 1 mL under the skin into the appropriate area as directed Every 7 (Seven) Days.     Authorizing Provider: RIC HOLLAND     Ordering User: JOSEMANUEL SUAREZ     Prescription orders above were sent to the pharmacy per Collaborative Care Agreement Protocol.

## 2024-11-07 LAB
GAMMA INTERFERON BACKGROUND BLD IA-ACNC: 0 IU/ML
M TB IFN-G BLD-IMP: NEGATIVE
M TB IFN-G CD4+ BCKGRND COR BLD-ACNC: 0 IU/ML
M TB IFN-G CD4+CD8+ BCKGRND COR BLD-ACNC: 0.01 IU/ML
MITOGEN IGNF BCKGRD COR BLD-ACNC: >10 IU/ML
QUANTIFERON INCUBATION: NORMAL
SERVICE CMNT-IMP: NORMAL

## 2024-11-12 RX ORDER — FENOFIBRATE 145 MG/1
145 TABLET, COATED ORAL DAILY
Qty: 90 TABLET | Refills: 3 | OUTPATIENT
Start: 2024-11-12

## 2024-11-12 RX ORDER — ATORVASTATIN CALCIUM 80 MG/1
80 TABLET, FILM COATED ORAL NIGHTLY
Qty: 90 TABLET | Refills: 3 | OUTPATIENT
Start: 2024-11-12

## 2024-11-13 ENCOUNTER — SPECIALTY PHARMACY (OUTPATIENT)
Age: 39
End: 2024-11-13
Payer: COMMERCIAL

## 2024-11-26 ENCOUNTER — OFFICE VISIT (OUTPATIENT)
Dept: INTERNAL MEDICINE | Facility: CLINIC | Age: 39
End: 2024-11-26
Payer: COMMERCIAL

## 2024-11-26 VITALS
TEMPERATURE: 97.8 F | HEART RATE: 63 BPM | OXYGEN SATURATION: 96 % | BODY MASS INDEX: 35.84 KG/M2 | WEIGHT: 242 LBS | DIASTOLIC BLOOD PRESSURE: 81 MMHG | SYSTOLIC BLOOD PRESSURE: 124 MMHG | RESPIRATION RATE: 16 BRPM | HEIGHT: 69 IN

## 2024-11-26 DIAGNOSIS — M21.619 BUNION: ICD-10-CM

## 2024-11-26 DIAGNOSIS — L40.50 PSORIATIC ARTHRITIS: ICD-10-CM

## 2024-11-26 DIAGNOSIS — D64.9 ANEMIA, UNSPECIFIED TYPE: ICD-10-CM

## 2024-11-26 DIAGNOSIS — M79.672 CHRONIC FOOT PAIN, LEFT: ICD-10-CM

## 2024-11-26 DIAGNOSIS — G89.29 CHRONIC FOOT PAIN, LEFT: ICD-10-CM

## 2024-11-26 DIAGNOSIS — E78.1 HYPERTRIGLYCERIDEMIA: Primary | ICD-10-CM

## 2024-11-26 PROCEDURE — 99214 OFFICE O/P EST MOD 30 MIN: CPT | Performed by: NURSE PRACTITIONER

## 2024-11-26 NOTE — PROGRESS NOTES
"  Office Visit      Patient Name: Ciera Gibson  : 1985   MRN: [id number]   Care Team: Patient Care Team:  Tyra Bro APRN as PCP - General (Family Medicine)  Tristan Talbert MD as Consulting Physician (Rheumatology)  Pamela Lakhani APRN as Nurse Practitioner (Rheumatology)  Terri Armstrong DO as Surgeon (General Surgery)  Brenda Nguyễn MD as Consulting Physician (Gastroenterology)  Alfonzo Tinsley APRN as Nurse Practitioner (Rheumatology)    Chief Complaint  Hyperlipidemia    Subjective     Subjective      Ciera Gibson presents to [facility] for follow-up  Hypertriglyceridemia: compliant with atorvastatin and fenofibrate.   Patient reports tolerating the medication well.   Denies myalgia, dark urine.   Still working to improve diet by decreasing red meats an focusing on lean proteins.   Denies any sign of recurrent pancreatitis: recent epigastric pain, nausea, vomiting, chest pain.   Patient still following with GI, has upcoming appointment in December.   Continues to follow with rheumatology for psoriatic arthritis. Symptoms feel well controlled.   Anemia: recent labs show hemoglobin and hematocrit within normal limits. Denies fatigue, hematochezia, dizziness, brandon bleeding.   Patient requests referral to podiatry regarding bilateral foot pain related to bunions, has tried loose fitting shoes with minimal improvement.     Objective     Objective   Vital Signs:   /81   Pulse 63   Temp 97.8 °F (36.6 °C)   Resp 16   Ht 175.3 cm (69\")   Wt 110 kg (242 lb)   SpO2 96%   BMI 35.74 kg/m²     Physical Exam  Vitals and nursing note reviewed.   Constitutional:       General: He is not in acute distress.     Appearance: Normal appearance. He is obese. He is not ill-appearing, toxic-appearing or diaphoretic.   Eyes:      General: No scleral icterus.     Extraocular Movements: Extraocular movements intact.   Neck:      Thyroid: No thyromegaly or thyroid " tenderness.   Cardiovascular:      Rate and Rhythm: Normal rate and regular rhythm.      Heart sounds: Normal heart sounds. No murmur heard.  Pulmonary:      Effort: Pulmonary effort is normal. No respiratory distress.      Breath sounds: Normal breath sounds. No stridor. No wheezing, rhonchi or rales.   Chest:      Chest wall: No tenderness.   Abdominal:      General: Abdomen is flat. Bowel sounds are normal. There is no distension.      Palpations: Abdomen is soft. There is no mass.      Tenderness: There is no abdominal tenderness. There is no guarding or rebound.      Hernia: No hernia is present.   Musculoskeletal:         General: Normal range of motion.      Cervical back: Normal range of motion. No tenderness.   Lymphadenopathy:      Cervical: No cervical adenopathy.   Neurological:      General: No focal deficit present.      Mental Status: He is alert and oriented to person, place, and time.   Psychiatric:         Mood and Affect: Mood normal.         Behavior: Behavior normal.         Thought Content: Thought content normal.         Judgment: Judgment normal.        Assessment / Plan      Assessment & Plan   Problem List Items Addressed This Visit       Psoriatic arthritis    Continue Enbrel as prescribed, continue following with rheumatology.     Hypertriglyceridemia - Primary    Continue atorvastatin and fenofibrate. Labs ordered to update. Counseled on mediterranean diet, moderate intensity exercise 4-5 times weekly. Encouraged to continue following with gastroenterology, and report to the emergency department with any signs/symptoms of pancreatitis, he verbalized understanding.     Anemia    Improved. Counseled on foods rich in iron.     Other Visit Diagnoses       Bunion        Relevant Orders    Ambulatory Referral to Podiatry    Referral to podiatry placed.     Chronic foot pain, left        Relevant Orders    Ambulatory Referral to Podiatry    Referral to podiatry placed.         This note  accurately reflects work and decisions made by me.    Follow Up   Return in about 9 months (around 8/23/2025) for Annual physical.  Patient was given instructions and counseling regarding his condition or for health maintenance advice. Please see specific information pulled into the AVS if appropriate.     ASHANTI Condon  [facility]

## 2024-12-13 ENCOUNTER — OFFICE VISIT (OUTPATIENT)
Dept: GASTROENTEROLOGY | Facility: CLINIC | Age: 39
End: 2024-12-13
Payer: COMMERCIAL

## 2024-12-13 VITALS
OXYGEN SATURATION: 98 % | WEIGHT: 251 LBS | DIASTOLIC BLOOD PRESSURE: 78 MMHG | SYSTOLIC BLOOD PRESSURE: 110 MMHG | HEART RATE: 71 BPM | BODY MASS INDEX: 37.07 KG/M2

## 2024-12-13 DIAGNOSIS — E78.1 HYPERTRIGLYCERIDEMIA: Primary | ICD-10-CM

## 2024-12-13 DIAGNOSIS — Z87.19 HISTORY OF PANCREATITIS: ICD-10-CM

## 2024-12-13 NOTE — PROGRESS NOTES
Follow Up Note     Date: 2024   Patient Name: Ciera Gibson  MRN: [id number]  : 1985     Referring Physician: Tyra Bro APRN    Chief Complaint:    Chief Complaint   Patient presents with    Follow-up    Hyperlipidemia       Interval History:   2024  Ciera Gibson is a 39 y.o. male who is here today for follow up.    Doing well.  Not having any active issues at this time.    Is on Lipitor and Tricor.  His most recent lipid panel looks good.      Subjective      Past Medical History:   Diagnosis Date    Acute pancreatitis     Bladder mass 2016    benign    Gall stones     High triglycerides     Psoriasis     Psoriatic arthritis     Sleep apnea     CPAP     Past Surgical History:   Procedure Laterality Date    ADENOIDECTOMY      BLADDER SURGERY      mass on the outside of the bladder removed, benign    CHOLECYSTECTOMY N/A 2024    Procedure: CHOLECYSTECTOMY LAPAROSCOPIC WITH DAVINCI ROBOT;  Surgeon: Terri Armstrong DO;  Location: [facility];  Service: Robotics - DaVinci;  Laterality: N/A;    COLONOSCOPY      HAND SURGERY Left     thumb repair    TONSILLECTOMY       Family History   Problem Relation Age of Onset    Stroke Mother     Hypertension Mother     Heart disease Mother         Massive stroke    Heart disease Father      Social History     Socioeconomic History    Marital status:    Tobacco Use    Smoking status: Former     Current packs/day: 0.00     Average packs/day: 1 pack/day for 12.3 years (12.3 ttl pk-yrs)     Types: Cigarettes     Start date: 1999     Quit date: 2005     Years since quittin.6     Passive exposure: Past    Smokeless tobacco: Never   Vaping Use    Vaping status: Never Used   Substance and Sexual Activity    Alcohol use: Not Currently    Drug use: Never    Sexual activity: Yes     Partners: Female     Birth control/protection: I.U.D.       Current Outpatient Medications:     atorvastatin (LIPITOR) 80 MG tablet, Take 1 tablet by  mouth Every Night, Disp: 90 tablet, Rfl: 3    clobetasol propionate (TEMOVATE) 0.05 % cream, Apply 1 Application topically to the appropriate area as directed 2 (Two) Times a Day., Disp: , Rfl:     Enbrel SureClick 50 MG/ML solution auto-injector, Inject 1 mL under the skin into the appropriate area as directed Every 7 (Seven) Days., Disp: 4 mL, Rfl: 5    fenofibrate (TRICOR) 145 MG tablet, Take 1 tablet by mouth Daily, Disp: 90 tablet, Rfl: 3    fluocinolone 0.01 % cream, Apply 1 Application topically to the appropriate area as directed 2 (Two) Times a Day., Disp: , Rfl:     halobetasol (ULTRAVATE) 0.05 % cream, Apply 1 Application topically to the appropriate area as directed. AS DIRECTED, Disp: , Rfl:   No Known Allergies  Review of Systems   Constitutional:  Negative for unexpected weight loss.   HENT:  Negative for trouble swallowing.    Gastrointestinal:  Negative for abdominal distention, abdominal pain, anal bleeding, blood in stool, constipation, diarrhea, nausea, rectal pain, vomiting, GERD and indigestion.       The following portions of the patient's history were reviewed and updated as appropriate: allergies, current medications, past family history, past medical history, past social history, past surgical history and problem list.    Objective     Physical Exam:  Vitals:    12/13/24 0908   BP: 110/78   Pulse: 71   SpO2: 98%   Weight: 114 kg (251 lb)       Physical Exam  Constitutional:       General: He is not in acute distress.     Appearance: Normal appearance.   HENT:      Head: Normocephalic and atraumatic.   Eyes:      General: No scleral icterus.     Conjunctiva/sclera: Conjunctivae normal.   Cardiovascular:      Rate and Rhythm: Normal rate.   Pulmonary:      Effort: Pulmonary effort is normal. No respiratory distress.   Skin:     Coloration: Skin is not jaundiced or pale.   Neurological:      Mental Status: He is alert.   Psychiatric:         Mood and Affect: Mood normal.         Behavior:  Behavior normal.         Results Review:   I reviewed the patient's new clinical results.    Lab on 11/05/2024   Component Date Value Ref Range Status    QuantiFERON Incubation 11/05/2024 Incubation performed.   Final    QUANTIFERON-TB GOLD PLUS 11/05/2024 Negative  Negative Final    No response to M tuberculosis antigens detected.  Infection with M tuberculosis is unlikely, but high risk  individuals should be considered for additional testing  (ATS/IDSA/CDC Clinical Practice Guidelines, 2017). The  reference range is an Antigen minus Nil result of <0.35 IU/mL.  Chemiluminescence immunoassay methodology    Glucose 11/05/2024 95  65 - 99 mg/dL Final    BUN 11/05/2024 17  6 - 20 mg/dL Final    Creatinine 11/05/2024 1.22  0.76 - 1.27 mg/dL Final    Sodium 11/05/2024 142  136 - 145 mmol/L Final    Potassium 11/05/2024 4.5  3.5 - 5.2 mmol/L Final    Chloride 11/05/2024 105  98 - 107 mmol/L Final    CO2 11/05/2024 28.1  22.0 - 29.0 mmol/L Final    Calcium 11/05/2024 9.6  8.6 - 10.5 mg/dL Final    Total Protein 11/05/2024 7.7  6.0 - 8.5 g/dL Final    Albumin 11/05/2024 4.6  3.5 - 5.2 g/dL Final    ALT (SGPT) 11/05/2024 37  1 - 41 U/L Final    AST (SGOT) 11/05/2024 26  1 - 40 U/L Final    Alkaline Phosphatase 11/05/2024 55  39 - 117 U/L Final    Total Bilirubin 11/05/2024 0.4  0.0 - 1.2 mg/dL Final    Globulin 11/05/2024 3.1  gm/dL Final    A/G Ratio 11/05/2024 1.5  g/dL Final    BUN/Creatinine Ratio 11/05/2024 13.9  7.0 - 25.0 Final    Anion Gap 11/05/2024 8.9  5.0 - 15.0 mmol/L Final    eGFR 11/05/2024 76.9  >60.0 mL/min/1.73 Final    C-Reactive Protein 11/05/2024 <0.30  0.00 - 0.50 mg/dL Final    Sed Rate 11/05/2024 3  0 - 15 mm/hr Final    WBC 11/05/2024 7.86  3.40 - 10.80 10*3/mm3 Final    RBC 11/05/2024 4.29  4.14 - 5.80 10*6/mm3 Final    Hemoglobin 11/05/2024 13.5  13.0 - 17.7 g/dL Final    Hematocrit 11/05/2024 38.9  37.5 - 51.0 % Final    MCV 11/05/2024 90.7  79.0 - 97.0 fL Final    MCH 11/05/2024 31.5  26.6 -  33.0 pg Final    MCHC 11/05/2024 34.7  31.5 - 35.7 g/dL Final    RDW 11/05/2024 12.8  12.3 - 15.4 % Final    RDW-SD 11/05/2024 41.7  37.0 - 54.0 fl Final    MPV 11/05/2024 9.3  6.0 - 12.0 fL Final    Platelets 11/05/2024 292  140 - 450 10*3/mm3 Final    nRBC 11/05/2024 0.0  0.0 - 0.2 /100 WBC Final    Hepatitis B Surface Ag 11/05/2024 Non-Reactive  Non-Reactive Final    Hep A IgM 11/05/2024 Non-Reactive  Non-Reactive Final    Hep B C IgM 11/05/2024 Non-Reactive  Non-Reactive Final    Hepatitis C Ab 11/05/2024 Non-Reactive  Non-Reactive Final    Neutrophil % 11/05/2024 35.0 (L)  42.7 - 76.0 % Final    Lymphocyte % 11/05/2024 50.0 (H)  19.6 - 45.3 % Final    Monocyte % 11/05/2024 2.0 (L)  5.0 - 12.0 % Final    Eosinophil % 11/05/2024 5.0  0.3 - 6.2 % Final    Basophil % 11/05/2024 2.0 (H)  0.0 - 1.5 % Final    Atypical Lymphocyte % 11/05/2024 6.0 (H)  0.0 - 5.0 % Final    Neutrophils Absolute 11/05/2024 2.75  1.70 - 7.00 10*3/mm3 Final    Lymphocytes Absolute 11/05/2024 4.40 (H)  0.70 - 3.10 10*3/mm3 Final    Monocytes Absolute 11/05/2024 0.16  0.10 - 0.90 10*3/mm3 Final    Eosinophils Absolute 11/05/2024 0.39  0.00 - 0.40 10*3/mm3 Final    Basophils Absolute 11/05/2024 0.16  0.00 - 0.20 10*3/mm3 Final    RBC Morphology 11/05/2024 Normal  Normal Final    WBC Morphology 11/05/2024 Normal  Normal Final    Platelet Morphology 11/05/2024 Normal  Normal Final    QuantiFERON Criteria 11/05/2024 Comment   Final    QuantiFERON-TB Gold Plus is a qualitative indirect test for  M tuberculosis infection (including disease) and is intended for use  in conjunction with risk assessment, radiography, and other medical  and diagnostic evaluations. The QuantiFERON-TB Gold Plus result is  determined by subtracting the Nil value from either TB antigen (Ag)  value. The Mitogen tube serves as a control for the test.    QUANTIFERON TB1 AG VALUE 11/05/2024 0.00  IU/mL Final    QUANTIFERON TB2 AG VALUE 11/05/2024 0.01  IU/mL Final     QuantiFERON Nil Value 11/05/2024 0.00  IU/mL Final    QuantiFERON Mitogen Value 11/05/2024 >10.00  IU/mL Final      No radiology results for the last 90 days.    Assessment / Plan      Diagnoses and all orders for this visit:    1. Hypertriglyceridemia (Primary)    2. History of pancreatitis         Reiterated counseling regarding dietary modifications for hypertriglyceridemia.  See prior notes for details regarding diagnosis.    Follow Up:   Return in about 1 year (around 12/13/2025).    Brenda Nguyễn MD  [facility] [place]  12/13/2024  11:30 EST     Part of this note may be an electronic transcription/translation of spoken language to printed text using the Dragon Dictation System.

## 2025-02-05 NOTE — PROGRESS NOTES
Office Follow Up      Date: 02/06/2025   Patient Name: Ciera Gibson  MRN: [id number]  YOB: 1985    Referring Physician: Tyra Bro APRN     Chief Complaint: Psoriatic arthritis    History of Present Illness: Ciera Gibson is a 39 y.o. male who is here today for follow up on psoriatic arthritis.    Joints are doing well.  Little pain and no swelling.   No infections or problems with injections.   Skin is doing well.  7/2024 had pancreatitis from hypertriglyceridemia. Had cholecystectomy 05532 as well.    Has been working on weight loss and working on diet.     Pain scale: 2/10. AM stiffness (30 Minutes).   Subjective   Review of Systems: Negative per patient.    Medications:   Current Outpatient Medications:     atorvastatin (LIPITOR) 80 MG tablet, Take 1 tablet by mouth Every Night, Disp: 90 tablet, Rfl: 3    ciclopirox (PENLAC) 8 % solution, APPLY TOPICALLY TO AFFECTED AREA(S) ONCE DAILY PREFERABLY AT BEDTIME OR 8 HOURS BEFORE WASHING, Disp: , Rfl:     clobetasol propionate (TEMOVATE) 0.05 % cream, Apply 1 Application topically to the appropriate area as directed 2 (Two) Times a Day., Disp: , Rfl:     Enbrel SureClick 50 MG/ML solution auto-injector, Inject 1 mL under the skin into the appropriate area as directed Every 7 (Seven) Days., Disp: 4 mL, Rfl: 5    fenofibrate (TRICOR) 145 MG tablet, Take 1 tablet by mouth Daily, Disp: 90 tablet, Rfl: 3    fluocinolone 0.01 % cream, Apply 1 Application topically to the appropriate area as directed 2 (Two) Times a Day., Disp: , Rfl:     halobetasol (ULTRAVATE) 0.05 % cream, Apply 1 Application topically to the appropriate area as directed. AS DIRECTED, Disp: , Rfl:     Allergies: No Known Allergies    I have reviewed and updated the patient's chief complaint, history of present illness, review of systems, past medical history, surgical history, family history, social history, medications and allergy list as appropriate.  "    Objective    Vital Signs:   Vitals:    02/06/25 0903   BP: 120/78   BP Location: Left arm   Patient Position: Sitting   Cuff Size: Adult   Pulse: 70   Temp: 97.5 °F (36.4 °C)   Weight: 115 kg (254 lb)   Height: 175.3 cm (69\")   PainSc:   2   PainLoc: Knee         Body mass index is 37.51 kg/m².  Defer to PCP    Physical Exam:    GENERAL: The patient is well-developed and well-nourished.  Cooperative and oriented ×3.  Affect is normal.  Hydration appears normal.  HEENT: Normocephalic and atraumatic.  No notable alopecia.  No facial rash.  Lids and conjunctiva are normal.  Pupils are equal and sclera are clear.  I see no oral or nasal ulcers.  Lips teeth and gums are within normal limits.  Oropharynx is clear.  NECK: Neck is supple without adenopathy, masses, or thyromegaly.  CARDIOVASCULAR: Normal S1, S2.  No murmurs are heard.  LUNGS: Clear to auscultation  ABDOMEN: Not examined.  EXTREMITIES: No edema.  No cyanosis or clubbing.    SKIN: Psoriatic plaques on elbows.    NEUROLOGIC: Gait is normal.  MUSCULOSKELETAL: Complete joint exam was performed.  There is full range of motion of the shoulders, wrists, and hands without notable deformities, soft tissue swelling, synovitis, or atrophy.  There is a flexion contracture of the right elbow.  Hips have good flexion and internal and external rotation. Knees have no effusions. Knees with crepitus.  There is full flexion and full extension of the knees without pain.  Ankles  have no soft tissue swelling or synovitis or major deformities.  BACK: Straight without notable scoliosis.    Joint Exam 02/06/2025     The following joints were examined and normal:   Left Glenohumeral, Right Glenohumeral, Left Elbow, Right Elbow, Left Wrist, Right Wrist, Left MCP 1, Right MCP 1, Left MCP 2, Right MCP 2, Left MCP 3, Right MCP 3, Left MCP 4, Right MCP 4, Left MCP 5, Right MCP 5, Left IP (thumb), Right IP (thumb), Left PIP 2 (finger), Right PIP 2 (finger), Left PIP 3 (finger), Right " PIP 3 (finger), Left PIP 4 (finger), Right PIP 4 (finger), Left PIP 5 (finger), Right PIP 5 (finger), Left Knee, Right Knee       Patient Global: 30 / 100  Provider Global: 10 / 100      Assessment / Plan      Assessment & Plan  Psoriatic arthritis  Onset approximately 2013.  Involving mainly the knees and ankles.  Long-standing psoriasis.  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 Otezla helped but some depressive side effects. Humira restarted 8/19. Humira stopped 11/19 for loss of efficacy. Enbrel started 11/19.    Doing well on Enbrel.   No joint swelling and little pain. Skin is doing fairly well with stable plaques on elbows that do not bother him.  Tender joint count is 0, swollen joint count is 0, physician global is  1, visual analog pain scale is 2, pt global is 3 .  Prognosis is good.  Lab order given.   Plan is q 3 month labs and monitoring visits.  Immunosuppression due to drug therapy  Enbrel.  QTB and hepatitis panel neg 11/5/24  Psoriasis  Stable on Enbrel      Follow Up:   Return in about 3 months (around 5/6/2025) for ASHANTI Bañuelos, ASHANTI Rai, Dr. Talbert.      ASHANTI Bañuelos  [facility] [place]

## 2025-02-05 NOTE — ASSESSMENT & PLAN NOTE
Onset approximately 2013.  Involving mainly the knees and ankles.  Long-standing psoriasis.  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 Otezla helped but some depressive side effects. Humira restarted 8/19. Humira stopped 11/19 for loss of efficacy. Enbrel started 11/19.    Doing well on Enbrel.   No joint swelling and little pain. Skin is doing fairly well with stable plaques on elbows that do not bother him.  Tender joint count is 0, swollen joint count is 0, physician global is  1, visual analog pain scale is 2, pt global is 3 .  Prognosis is good.  Lab order given.   Plan is q 3 month labs and monitoring visits.

## 2025-02-06 ENCOUNTER — OFFICE VISIT (OUTPATIENT)
Age: 40
End: 2025-02-06
Payer: COMMERCIAL

## 2025-02-06 ENCOUNTER — LAB (OUTPATIENT)
Facility: HOSPITAL | Age: 40
End: 2025-02-06
Payer: COMMERCIAL

## 2025-02-06 ENCOUNTER — TELEPHONE (OUTPATIENT)
Age: 40
End: 2025-02-06

## 2025-02-06 VITALS
TEMPERATURE: 97.5 F | HEIGHT: 69 IN | BODY MASS INDEX: 37.62 KG/M2 | DIASTOLIC BLOOD PRESSURE: 78 MMHG | SYSTOLIC BLOOD PRESSURE: 120 MMHG | WEIGHT: 254 LBS | HEART RATE: 70 BPM

## 2025-02-06 DIAGNOSIS — L40.50 PSORIATIC ARTHRITIS: ICD-10-CM

## 2025-02-06 DIAGNOSIS — Z79.899 IMMUNOSUPPRESSION DUE TO DRUG THERAPY: ICD-10-CM

## 2025-02-06 DIAGNOSIS — D84.821 IMMUNOSUPPRESSION DUE TO DRUG THERAPY: ICD-10-CM

## 2025-02-06 DIAGNOSIS — L40.50 PSORIATIC ARTHRITIS: Primary | ICD-10-CM

## 2025-02-06 DIAGNOSIS — L40.9 PSORIASIS: ICD-10-CM

## 2025-02-06 LAB
ALBUMIN SERPL-MCNC: 4.8 G/DL (ref 3.5–5.2)
ALBUMIN/GLOB SERPL: 1.4 G/DL
ALP SERPL-CCNC: 50 U/L (ref 39–117)
ALT SERPL W P-5'-P-CCNC: 40 U/L (ref 1–41)
ANION GAP SERPL CALCULATED.3IONS-SCNC: 9.9 MMOL/L (ref 5–15)
AST SERPL-CCNC: 28 U/L (ref 1–40)
BASOPHILS # BLD MANUAL: 0.08 10*3/MM3 (ref 0–0.2)
BASOPHILS NFR BLD MANUAL: 1 % (ref 0–1.5)
BILIRUB SERPL-MCNC: 0.5 MG/DL (ref 0–1.2)
BUN SERPL-MCNC: 21 MG/DL (ref 6–20)
BUN/CREAT SERPL: 17.2 (ref 7–25)
CALCIUM SPEC-SCNC: 9.7 MG/DL (ref 8.6–10.5)
CHLORIDE SERPL-SCNC: 104 MMOL/L (ref 98–107)
CO2 SERPL-SCNC: 26.1 MMOL/L (ref 22–29)
CREAT SERPL-MCNC: 1.22 MG/DL (ref 0.76–1.27)
CRP SERPL-MCNC: <0.3 MG/DL (ref 0–0.5)
DEPRECATED RDW RBC AUTO: 41.3 FL (ref 37–54)
EGFRCR SERPLBLD CKD-EPI 2021: 76.9 ML/MIN/1.73
EOSINOPHIL # BLD MANUAL: 0.16 10*3/MM3 (ref 0–0.4)
EOSINOPHIL NFR BLD MANUAL: 2 % (ref 0.3–6.2)
ERYTHROCYTE [DISTWIDTH] IN BLOOD BY AUTOMATED COUNT: 12.6 % (ref 12.3–15.4)
ERYTHROCYTE [SEDIMENTATION RATE] IN BLOOD: 6 MM/HR (ref 0–15)
GLOBULIN UR ELPH-MCNC: 3.4 GM/DL
GLUCOSE SERPL-MCNC: 98 MG/DL (ref 65–99)
HCT VFR BLD AUTO: 40.4 % (ref 37.5–51)
HGB BLD-MCNC: 14.1 G/DL (ref 13–17.7)
LYMPHOCYTES # BLD MANUAL: 3.79 10*3/MM3 (ref 0.7–3.1)
LYMPHOCYTES NFR BLD MANUAL: 11.1 % (ref 5–12)
MCH RBC QN AUTO: 31.8 PG (ref 26.6–33)
MCHC RBC AUTO-ENTMCNC: 34.9 G/DL (ref 31.5–35.7)
MCV RBC AUTO: 91 FL (ref 79–97)
MONOCYTES # BLD: 0.87 10*3/MM3 (ref 0.1–0.9)
NEUTROPHILS # BLD AUTO: 2.92 10*3/MM3 (ref 1.7–7)
NEUTROPHILS NFR BLD MANUAL: 37.4 % (ref 42.7–76)
NRBC BLD AUTO-RTO: 0 /100 WBC (ref 0–0.2)
PLAT MORPH BLD: NORMAL
PLATELET # BLD AUTO: 285 10*3/MM3 (ref 140–450)
PMV BLD AUTO: 9.5 FL (ref 6–12)
POTASSIUM SERPL-SCNC: 4.4 MMOL/L (ref 3.5–5.2)
PROT SERPL-MCNC: 8.2 G/DL (ref 6–8.5)
RBC # BLD AUTO: 4.44 10*6/MM3 (ref 4.14–5.8)
RBC MORPH BLD: NORMAL
SODIUM SERPL-SCNC: 140 MMOL/L (ref 136–145)
VARIANT LYMPHS NFR BLD MANUAL: 48.5 % (ref 19.6–45.3)
WBC MORPH BLD: NORMAL
WBC NRBC COR # BLD AUTO: 7.81 10*3/MM3 (ref 3.4–10.8)

## 2025-02-06 PROCEDURE — 86140 C-REACTIVE PROTEIN: CPT

## 2025-02-06 PROCEDURE — 85007 BL SMEAR W/DIFF WBC COUNT: CPT

## 2025-02-06 PROCEDURE — 85652 RBC SED RATE AUTOMATED: CPT

## 2025-02-06 PROCEDURE — 80053 COMPREHEN METABOLIC PANEL: CPT

## 2025-02-06 PROCEDURE — 36415 COLL VENOUS BLD VENIPUNCTURE: CPT

## 2025-02-06 PROCEDURE — 99214 OFFICE O/P EST MOD 30 MIN: CPT | Performed by: NURSE PRACTITIONER

## 2025-02-06 PROCEDURE — 85025 COMPLETE CBC W/AUTO DIFF WBC: CPT

## 2025-02-06 RX ORDER — CICLOPIROX 80 MG/ML
SOLUTION TOPICAL
COMMUNITY
Start: 2024-12-30

## 2025-02-06 RX ORDER — MEDROXYPROGESTERONE ACETATE 150 MG/ML
1 INJECTION, SUSPENSION INTRAMUSCULAR
Qty: 4 ML | Refills: 5 | Status: SHIPPED | OUTPATIENT
Start: 2025-02-06

## 2025-02-06 RX ORDER — IBUPROFEN 800 MG/1
1 TABLET, FILM COATED ORAL EVERY 12 HOURS SCHEDULED
COMMUNITY
Start: 2024-12-30 | End: 2025-02-06

## 2025-02-06 NOTE — TELEPHONE ENCOUNTER
Specialty Pharmacy Patient Management Program  Per Protocol Prescription Order/Refill     Patient currently fills medications at [facility] and is not enrolled in an Rheumatology Patient Management Program.     Requested Prescriptions     Signed Prescriptions Disp Refills    Enbrel SureClick 50 MG/ML solution auto-injector 4 mL 5     Sig: Inject 1 mL under the skin into the appropriate area as directed Every 7 (Seven) Days.     Authorizing Provider: RIC HOLLAND     Ordering User: LOUIS TURK     Prescription orders above were sent to the pharmacy per Collaborative Care Agreement Protocol.     Louis Turk, PharmD, BCPS  Clinical Specialty Pharmacist, Rheumatology   2/6/2025  13:32 EST

## 2025-03-20 ENCOUNTER — PATIENT ROUNDING (BHMG ONLY) (OUTPATIENT)
Dept: URGENT CARE | Facility: CLINIC | Age: 40
End: 2025-03-20
Payer: COMMERCIAL

## 2025-06-20 RX ORDER — ATORVASTATIN CALCIUM 80 MG/1
80 TABLET, FILM COATED ORAL NIGHTLY
Qty: 90 TABLET | Refills: 0 | Status: SHIPPED | OUTPATIENT
Start: 2025-06-20

## 2025-06-20 RX ORDER — FENOFIBRATE 145 MG/1
145 TABLET, FILM COATED ORAL DAILY
Qty: 90 TABLET | Refills: 0 | Status: SHIPPED | OUTPATIENT
Start: 2025-06-20

## 2025-07-22 ENCOUNTER — TELEPHONE (OUTPATIENT)
Age: 40
End: 2025-07-22
Payer: COMMERCIAL

## 2025-07-22 NOTE — TELEPHONE ENCOUNTER
File Link    Scan on 7/22/2025 0904 by [employer], [facility]: [employer],ENBREL,7/22/25        Key Information    Document ID File Type Document Type Description   [id number] Image REFERRAL/PRE-AUTH CSN - SCAN [employer],ENBREL,7/22/25     Import Information    Attached At Date Time User Dept   Encounter Level 7/22/2025  9:04 AM [employer], [facility]      Encounter    Scanned Document on 7/22/25 with [employer], [facility]

## 2025-07-24 ENCOUNTER — SPECIALTY PHARMACY (OUTPATIENT)
Age: 40
End: 2025-07-24
Payer: COMMERCIAL

## 2025-07-24 NOTE — TELEPHONE ENCOUNTER
PA request has been approved and pharmacy notified (Filling pharmacy will be notified by phone, fax, or submitted prescription)    Authorized Medication: ENBREL  Name of Insurance Approving PA: CATRACHITA NGUYEN  PA Effective Dates: through 1.20.27  Dispensing Pharmacy: [facility] specialty

## 2025-07-24 NOTE — TELEPHONE ENCOUNTER
Prior authorization initiated by [facility].  Update will be provided when a determination has been received.     Medication: Enbrel  PA Submission Method: CMM  Case Number/CMM Key: [id number]

## 2025-07-29 ENCOUNTER — OFFICE VISIT (OUTPATIENT)
Age: 40
End: 2025-07-29
Payer: COMMERCIAL

## 2025-07-29 ENCOUNTER — TELEPHONE (OUTPATIENT)
Age: 40
End: 2025-07-29

## 2025-07-29 VITALS
WEIGHT: 256.3 LBS | BODY MASS INDEX: 37.96 KG/M2 | HEIGHT: 69 IN | SYSTOLIC BLOOD PRESSURE: 122 MMHG | TEMPERATURE: 97.9 F | HEART RATE: 88 BPM | DIASTOLIC BLOOD PRESSURE: 80 MMHG

## 2025-07-29 DIAGNOSIS — D84.821 IMMUNOSUPPRESSION DUE TO DRUG THERAPY: Chronic | ICD-10-CM

## 2025-07-29 DIAGNOSIS — L40.50 PSORIATIC ARTHRITIS: Primary | Chronic | ICD-10-CM

## 2025-07-29 DIAGNOSIS — L40.9 PSORIASIS: Chronic | ICD-10-CM

## 2025-07-29 DIAGNOSIS — Z79.899 IMMUNOSUPPRESSION DUE TO DRUG THERAPY: Chronic | ICD-10-CM

## 2025-07-29 DIAGNOSIS — R53.83 FATIGUE, UNSPECIFIED TYPE: ICD-10-CM

## 2025-07-29 RX ORDER — MEDROXYPROGESTERONE ACETATE 150 MG/ML
1 INJECTION, SUSPENSION INTRAMUSCULAR
Qty: 4 ML | Refills: 2 | Status: SHIPPED | OUTPATIENT
Start: 2025-07-29

## 2025-07-29 NOTE — PROGRESS NOTES
Office Follow Up      Date: 07/29/2025   Patient Name: Ciera Gibson  MRN: [id number]  YOB: 1985    Referring Physician: No ref. provider found     Chief Complaint: Psoriatic arthritis    History of Present Illness: Ciera Gibson is a 40 y.o. male who is here today for follow up on psoriatic arthritis.    History of Present Illness  He reports a recent  training in [place] during which he was unable to maintain his regular medication regimen. Upon returning, he experienced severe ankle pain, which he describes as the most intense discomfort he has ever felt in his ankles. He also mentions that his knees tend to swell and become sore if he misses his medication. The ankle pain was so severe that he found it difficult to walk on the outside of his feet. He has taken approximately 5 doses of his medication since then, which seems to have alleviated the pain. He is seeking advice on over-the-counter medications that could help manage such episodes in the future. He has previously used ibuprofen and Aleve without any adverse effects.    He also reports a popping sensation in his knee when he was in a pool yesterday. He believes that weight loss could potentially alleviate his joint pain. He has never been diagnosed with gout. He recently refilled his Enbrel prescription and has not encountered any issues with obtaining refills. He is due for a follow-up with Dr. Talbert in 3 months.    He has psoriasis plaques on his elbows and knees, which have not improved despite being on Enbrel. He has been using creams for these plaques, but they do not cause him significant discomfort.      Subjective   Review of Systems: Review of Systems   All other systems reviewed and are negative.    Medications:   Current Outpatient Medications:     atorvastatin (LIPITOR) 80 MG tablet, TAKE 1 TABLET BY MOUTH EVERY NIGHT, Disp: 90 tablet, Rfl: 0    ciclopirox (PENLAC) 8 % solution, APPLY TOPICALLY TO  "AFFECTED AREA(S) ONCE DAILY PREFERABLY AT BEDTIME OR 8 HOURS BEFORE WASHING, Disp: , Rfl:     clobetasol propionate (TEMOVATE) 0.05 % cream, Apply 1 Application topically to the appropriate area as directed 2 (Two) Times a Day., Disp: , Rfl:     fenofibrate (TRICOR) 145 MG tablet, TAKE 1 TABLET BY MOUTH EVERY DAY, Disp: 90 tablet, Rfl: 0    Enbrel SureClick 50 MG/ML solution auto-injector, Inject 1 mL under the skin into the appropriate area as directed Every 7 (Seven) Days., Disp: 4 mL, Rfl: 2    Allergies: No Known Allergies    I have reviewed and updated the patient's chief complaint, history of present illness, review of systems, past medical history, surgical history, family history, social history, medications and allergy list as appropriate.     Objective    Vital Signs:   Vitals:    07/29/25 1305   BP: 122/80   BP Location: Left arm   Patient Position: Sitting   Cuff Size: Large Adult   Pulse: 88   Temp: 97.9 °F (36.6 °C)   Weight: 116 kg (256 lb 4.8 oz)   Height: 175.3 cm (69.02\")   PainSc: 7            Body mass index is 37.83 kg/m².  Defer to PCP    Physical Exam:     GENERAL: The patient is well-developed and well-nourished.  Cooperative and oriented ×3.  Affect is normal.  Hydration appears normal.  HEENT: Normocephalic and atraumatic.  No notable alopecia.  No facial rash.  Lids and conjunctiva are normal.  Pupils are equal and sclera are clear.  I see no oral or nasal ulcers.  Lips teeth and gums are within normal limits.  Oropharynx is clear.  NECK: Neck is supple without adenopathy, masses, or thyromegaly.  CARDIOVASCULAR: Normal S1, S2.  No murmurs are heard.  LUNGS: Clear to auscultation  ABDOMEN: Not examined.  EXTREMITIES: No edema.  No cyanosis or clubbing.    SKIN: Psoriatic plaques on elbows and bilateral knees   NEUROLOGIC: Gait is normal.    MUSCULOSKELETAL: Complete joint exam was performed.  There is full range of motion of the shoulders, wrists, and hands without notable deformities, " soft tissue swelling, synovitis, or atrophy.   Knees have no effusions. Knees with crepitus. There is full flexion and full extension of the knees without pain.  No synovitis noted     BACK: Straight without notable scoliosis.    Joint Exam 07/29/2025     No joint exam has been documented for this visit       Patient Global: --  Provider Global: --  There is currently no information documented on the homunculus. Go to the Rheumatology activity and complete the homunculus joint exam.    Assessment / Plan      Assessment & Plan  Psoriatic arthritis  Onset approximately 2013.  Involving mainly the knees and ankles.  Long-standing psoriasis.  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 Otezla helped but some depressive side effects. Humira restarted 8/19. Humira stopped 11/19 for loss of efficacy. Enbrel started 11/19.    Doing well on Enbrel.   No joint swelling and little pain. Skin is doing fairly well with stable plaques on elbows that do not bother him.  Tender joint count is 0, swollen joint count is 0, physician global is  1, visual analog pain scale is 2, pt global is 3 .  Had a flare in the meantime when he was at  training  We discussed if he has another trip upcoming that we can send in a steroid taper to have on hand if he has another flare.   Prognosis is good.  Lab order given.   Plan is q 3 month labs and monitoring visits.  Immunosuppression due to drug therapy  Enbrel.  QTB and hepatitis panel neg 11/5/24  Psoriasis  Has patches to elbows and knees.     Assessment & Plan      Patient or patient representative verbalized consent for the use of Ambient Listening during the visit with  ASHANTI Venegas for chart documentation. 7/31/2025  13:15 EDT    I attest that the documentation was copied from a previous note but is still current and accurate.    Follow Up:   Return in about 3 months (around  10/29/2025) randy Talbert.      ASHANTI Venegas  [facility]

## 2025-07-29 NOTE — ASSESSMENT & PLAN NOTE
Onset approximately 2013.  Involving mainly the knees and ankles.  Long-standing psoriasis.  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 Otezla helped but some depressive side effects. Humira restarted 8/19. Humira stopped 11/19 for loss of efficacy. Enbrel started 11/19.    Doing well on Enbrel.   No joint swelling and little pain. Skin is doing fairly well with stable plaques on elbows that do not bother him.  Tender joint count is 0, swollen joint count is 0, physician global is  1, visual analog pain scale is 2, pt global is 3 .  Had a flare in the meantime when he was at  training  We discussed if he has another trip upcoming that we can send in a steroid taper to have on hand if he has another flare.   Prognosis is good.  Lab order given.   Plan is q 3 month labs and monitoring visits.

## 2025-07-29 NOTE — TELEPHONE ENCOUNTER
Specialty Pharmacy Patient Management Program  Per Protocol Prescription Order/Refill     Patient currently fills medications at [facility] and is not enrolled in an Rheumatology Patient Management Program.     Requested Prescriptions     Signed Prescriptions Disp Refills    Enbrel SureClick 50 MG/ML solution auto-injector 4 mL 2     Sig: Inject 1 mL under the skin into the appropriate area as directed Every 7 (Seven) Days.     Authorizing Provider: URMILA CATALAN     Ordering User: JOSEMANUEL SUAREZ     Prescription orders above were sent to the pharmacy per Collaborative Care Agreement Protocol.

## 2025-07-30 LAB
ALBUMIN SERPL-MCNC: 4.7 G/DL (ref 4.1–5.1)
ALP SERPL-CCNC: 83 IU/L (ref 44–121)
ALT SERPL-CCNC: 33 IU/L (ref 0–44)
AST SERPL-CCNC: 18 IU/L (ref 0–40)
BILIRUB SERPL-MCNC: 0.3 MG/DL (ref 0–1.2)
BUN SERPL-MCNC: 19 MG/DL (ref 6–24)
BUN/CREAT SERPL: 16 (ref 9–20)
CALCIUM SERPL-MCNC: 9.9 MG/DL (ref 8.7–10.2)
CHLORIDE SERPL-SCNC: 104 MMOL/L (ref 96–106)
CO2 SERPL-SCNC: 20 MMOL/L (ref 20–29)
CREAT SERPL-MCNC: 1.19 MG/DL (ref 0.76–1.27)
CRP SERPL-MCNC: <1 MG/L (ref 0–10)
EGFRCR SERPLBLD CKD-EPI 2021: 79 ML/MIN/1.73
ERYTHROCYTE [DISTWIDTH] IN BLOOD BY AUTOMATED COUNT: 13 % (ref 11.6–15.4)
ERYTHROCYTE [SEDIMENTATION RATE] IN BLOOD BY WESTERGREN METHOD: 13 MM/HR (ref 0–15)
GLOBULIN SER CALC-MCNC: 3.1 G/DL (ref 1.5–4.5)
GLUCOSE SERPL-MCNC: 117 MG/DL (ref 70–99)
HCT VFR BLD AUTO: 41.3 % (ref 37.5–51)
HGB BLD-MCNC: 14.1 G/DL (ref 13–17.7)
MCH RBC QN AUTO: 31.7 PG (ref 26.6–33)
MCHC RBC AUTO-ENTMCNC: 34.1 G/DL (ref 31.5–35.7)
MCV RBC AUTO: 93 FL (ref 79–97)
PLATELET # BLD AUTO: 324 X10E3/UL (ref 150–450)
POTASSIUM SERPL-SCNC: 4.4 MMOL/L (ref 3.5–5.2)
PROT SERPL-MCNC: 7.8 G/DL (ref 6–8.5)
RBC # BLD AUTO: 4.45 X10E6/UL (ref 4.14–5.8)
SODIUM SERPL-SCNC: 141 MMOL/L (ref 134–144)
WBC # BLD AUTO: 8 X10E3/UL (ref 3.4–10.8)

## (undated) DEVICE — ADHS SKIN PREMIERPRO EXOFIN TOPICAL HI/VISC .5ML

## (undated) DEVICE — ANTIBACTERIAL UNDYED BRAIDED (POLYGLACTIN 910), SYNTHETIC ABSORBABLE SUTURE: Brand: COATED VICRYL

## (undated) DEVICE — CANNULA SEAL

## (undated) DEVICE — PROGRASP FORCEPS: Brand: ENDOWRIST

## (undated) DEVICE — PATIENT RETURN ELECTRODE, SINGLE-USE, CONTACT QUALITY MONITORING, ADULT, WITH 9FT CORD, FOR PATIENTS WEIGING OVER 33LBS. (15KG): Brand: MEGADYNE

## (undated) DEVICE — GLV SURG SENSICARE POLYISPRN W/ALOE PF LF 6.5 GRN STRL

## (undated) DEVICE — SUCTION IRRIGATOR: Brand: ENDOWRIST

## (undated) DEVICE — ST TBG PNEUMOCLEAR EVAC SMOKE HIFLO

## (undated) DEVICE — COLUMN DRAPE

## (undated) DEVICE — SUT VIC 0/0 UR6 27IN DYED J603H

## (undated) DEVICE — HYPODERMIC SAFETY NEEDLE: Brand: MONOJECT

## (undated) DEVICE — BLADELESS OBTURATOR: Brand: WECK VISTA

## (undated) DEVICE — ARM DRAPE

## (undated) DEVICE — SYR LL TP 10ML STRL

## (undated) DEVICE — CLNSR INST PREKLENZ SOAK/SHLD 6ML MD

## (undated) DEVICE — CADIERE FORCEPS: Brand: ENDOWRIST

## (undated) DEVICE — GLV SURG BIOGEL PI ULTRATOUCH G SZ6.5 LF

## (undated) DEVICE — SYR SLPTP 30CC

## (undated) DEVICE — HDRST POSTN SLOTTED A/

## (undated) DEVICE — LARGE HEM-O-LOK CLIP APPLIER: Brand: ENDOWRIST

## (undated) DEVICE — ANCHOR TISSUE RETRIEVAL SYSTEM, BAG SIZE 125 ML, PORT SIZE 8 MM: Brand: ANCHOR TISSUE RETRIEVAL SYSTEM

## (undated) DEVICE — PERMANENT CAUTERY HOOK: Brand: ENDOWRIST

## (undated) DEVICE — PK LAP GEN 70